# Patient Record
Sex: FEMALE | Race: WHITE | Employment: OTHER | URBAN - METROPOLITAN AREA
[De-identification: names, ages, dates, MRNs, and addresses within clinical notes are randomized per-mention and may not be internally consistent; named-entity substitution may affect disease eponyms.]

---

## 2017-11-24 ENCOUNTER — HOSPITAL ENCOUNTER (OUTPATIENT)
Dept: MAMMOGRAPHY | Facility: CLINIC | Age: 72
Discharge: HOME | End: 2017-11-24
Attending: FAMILY MEDICINE | Admitting: FAMILY MEDICINE
Payer: MEDICARE

## 2017-11-24 DIAGNOSIS — Z12.31 VISIT FOR SCREENING MAMMOGRAM: ICD-10-CM

## 2017-11-24 PROCEDURE — G0202 SCR MAMMO BI INCL CAD: HCPCS

## 2017-12-11 ENCOUNTER — OFFICE VISIT (OUTPATIENT)
Dept: VASCULAR SURGERY | Facility: CLINIC | Age: 72
End: 2017-12-11
Payer: COMMERCIAL

## 2017-12-11 DIAGNOSIS — Z53.9 ERRONEOUS ENCOUNTER--DISREGARD: Primary | ICD-10-CM

## 2017-12-11 PROCEDURE — 99203 OFFICE O/P NEW LOW 30 MIN: CPT | Performed by: SURGERY

## 2017-12-11 NOTE — MR AVS SNAPSHOT
"              After Visit Summary   2017    Batsheva Fuentes    MRN: [id number]           Patient Information     Date Of Birth          1945        Visit Information        Provider Department      2017 1:15 PM Nagi Baig MD [facility]      Today's Diagnoses     ERRONEOUS ENCOUNTER--DISREGARD    -  1       Follow-ups after your visit        Who to contact     If you have questions or need follow up information about today's clinic visit or your schedule please contact [facility] directly at [phone].  Normal or non-critical lab and imaging results will be communicated to you by MyChart, letter or phone within 4 business days after the clinic has received the results. If you do not hear from us within 7 days, please contact the clinic through The Whistlehart or phone. If you have a critical or abnormal lab result, we will notify you by phone as soon as possible.  Submit refill requests through Aunt Bertha or call your pharmacy and they will forward the refill request to us. Please allow 3 business days for your refill to be completed.          Additional Information About Your Visit        MyChart Information     Aunt Bertha lets you send messages to your doctor, view your test results, renew your prescriptions, schedule appointments and more. To sign up, go to [email or web address] . Click on \"Log in\" on the left side of the screen, which will take you to the Welcome page. Then click on \"Sign up Now\" on the right side of the page.     You will be asked to enter the access code listed below, as well as some personal information. Please follow the directions to create your username and password.     Your access code is: [email or web address]  Expires: 2018  9:02 AM     Your access code will  in 90 days. If you need help or a new code, please call your [place] clinic or [phone].        Care EveryWhere ID  "    This is your Care EveryWhere ID. This could be used by other organizations to access your [facility] medical records  [id number]         Blood Pressure from Last 3 Encounters:   No data found for BP    Weight from Last 3 Encounters:   No data found for Wt              Today, you had the following     No orders found for display       Primary Care Provider Office Phone # Fax #    Eileen oG PA-C [phone] [address]  [place] [address]        Equal Access to Services     NATALIIA OLIVA : Hadii aad ku hadasho Soomaali, waaxda luqadaha, qaybta kaalmada adeegyada, waxay idiin hayaan adeeg kharash lata . So [facility] [phone].    ATENCIÓN: Si habla español, tiene a archer disposición servicios gratuitos de asistencia lingüística. LlOur [facility] [phone].    We comply with applicable federal civil rights laws and [place] laws. We do not discriminate on the basis of race, color, national origin, age, disability, sex, sexual orientation, or gender identity.            Thank you!     Thank you for choosing [facility]  for your care. Our goal is always to provide you with excellent care. Hearing back from our patients is one way we can continue to improve our services. Please take a few minutes to complete the written survey that you may receive in the mail after your visit with us. Thank you!             Your Updated Medication List - Protect others around you: Learn how to safely use, store and throw away your medicines at www.disposemymeds.org.      Notice  As of 12/11/2017 11:59 PM    You have not been prescribed any medications.

## 2017-12-11 NOTE — PROGRESS NOTES
[facility]: Gloria Fuentes And her  came to see me today for venous consultation.  This 72-year-old patient has had varicose veins in her right thigh region that developed after an injury at work being hit by file cabinet 25 years ago. These have given her some gradually increasing achy sensation but no terrible problems  She's never worn compression.  She developed a tender area over her right mid medial calf.  She reports ultrasound was performed via the  [facility] [employer] system on 10/16/2017.  She was admitted overnight and started on Lovenox followed by Coumadin recommended to be continued for three months.  Apparently a tibial DVT was noted though the reports are not available to me.    With her larger varicose veins she comes discuss this further today about potential treatment..  She also wondered this had anything to do with her right leg blood clot. She has noticed swelling in her right calf and ankle since the event.  She has not used compression.    PMH: No allergies.            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                            Hysterectomy for benign disease                            Sinus surgery             Medical: Hypertension, hypothyroidism, depression             Nonsmoker.    Social history: Retired, lives independently with her .                           Walks at least 30 minutes daily with no claudication symptoms nor shortness of breath nor chest pain.    Exam: Very pleasant alert woman.  Height 5 foot 7 inches.  Weight= 150 pound              Glasses and hearing aids               HEENT= normal.  No carotid bruits.               Chest= clear   Cardiovascular= regular rate with 1/6 murmur (  "noted since                                     childbirth)                Abdomen=thin, soft, nontender                Extremities:   Normal sensation.  +3 posterior tibial pulses                                      Right calf is 29 cm versus 25 cm and the left                                      Very large tortuous varicose veins starting the proximal one quarter of the right thigh extending down just below the knee measuring 60 mm in diameter.  A few other varicose veins noted but very small.  A few diffuse telangiectasia bilaterally.      Impression:  #1  Right thigh and knee very dilated varicose veins.  Likely, the greater saphenous system.  No specific evaluation has been performed.  Plan duplex ultrasound of the venous system including greater saphenous vein. Likely would benefit due to the large size of the vein in symptoms with surgical treatment.                         #2   Right medial calf \" blood clot\".  Her symptoms of discomfort at the area more consistent with a superficial thrombophlebitis of the greater saphenous vein from the location clinically.  However,the fact that she was treated with anticoagulation for three months imply this may have been a tibial DVT.  She'll obtain the records from the hospital and forward these to us.  She is scheduled to be on her Coumadin until January 2018.  We will see her back at that time and repeat the duplex ultrasound evaluating function of her venous system but also to look at the tibial DVT to see if it is resolved.                          #3   Right calf swelling noted after the blood clot.  This implies that it certainly could've been a tibial DVT since one would not expect swelling from a superficial thrombophlebitis.  She'll start wearing her knee-high compression stocking which may improve the swelling and also be a pre-treatment necessity prior to any superficial venous surgery.                           #4   No evidence of PAD with excellent " distal pulses and very active with no claudication-type symptoms.    I spent over 30 minutes with the patient and  today with over 50% in counseling.  We will see her again following the duplex ultrasound in January 2018 when she is completing her Coumadin three month therapy.    Nagi Baig MD   Dictated 12/11/2017    Cc.  Dr Eileen Go

## 2018-01-29 ENCOUNTER — APPOINTMENT (OUTPATIENT)
Dept: VASCULAR SURGERY | Facility: CLINIC | Age: 73
End: 2018-01-29
Payer: COMMERCIAL

## 2018-01-29 ENCOUNTER — OFFICE VISIT (OUTPATIENT)
Dept: VASCULAR SURGERY | Facility: CLINIC | Age: 73
End: 2018-01-29
Payer: COMMERCIAL

## 2018-01-29 DIAGNOSIS — Z53.9 ERRONEOUS ENCOUNTER--DISREGARD: Primary | ICD-10-CM

## 2018-01-29 PROCEDURE — 99213 OFFICE O/P EST LOW 20 MIN: CPT | Performed by: SURGERY

## 2018-01-29 PROCEDURE — 93971 EXTREMITY STUDY: CPT | Performed by: SURGERY

## 2018-01-29 NOTE — MR AVS SNAPSHOT
"              After Visit Summary   2018    Batsheva Fuentes    MRN: [id number]           Patient Information     Date Of Birth          1945        Visit Information        Provider Department      2018 4:15 PM [facility]i [facility]ig [facility]      Today's Diagnoses     ERRONEOUS ENCOUNTER--DISREGARD    -  1       Follow-ups after your visit        Who to contact     If you have questions or need follow up information about today's clinic visit or your schedule please contact [facility] directly at [phone].  Normal or non-critical lab and imaging results will be communicated to you by MyChart, letter or phone within 4 business days after the clinic has received the results. If you do not hear from us within 7 days, please contact the clinic through ESCAPESwithYOUhart or phone. If you have a critical or abnormal lab result, we will notify you by phone as soon as possible.  Submit refill requests through [employer] or call your pharmacy and they will forward the refill request to us. Please allow 3 business days for your refill to be completed.          Additional Information About Your Visit        MyChart Information     E[employer]ault lets you send messages to your doctor, view your test results, renew your prescriptions, schedule appointments and more. To sign up, go to [email or web address] . Click on \"Log in\" on the left side of the screen, which will take you to the Welcome page. Then click on \"Sign up Now\" on the right side of the page.     You will be asked to enter the access code listed below, as well as some personal information. Please follow the directions to create your username and password.     Your access code is: [email or web address]  Expires: 2018  9:02 AM     Your access code will  in 90 days. If you need help or a new code, please call your [place] clinic or [phone].        Care EveryWhere ID     " This is your Care EveryWhere ID. This could be used by other organizations to access your [facility] medical records  [id number]         Blood Pressure from Last 3 Encounters:   No data found for BP    Weight from Last 3 Encounters:   No data found for Wt              Today, you had the following     No orders found for display       Primary Care Provider Office Phone # Fax #    Eileen Go PA-C [phone] [address]  [place] [address]        Equal Access to Services     NATALIIA OLIVA : Hadii aad ku hadasho Soomaali, waaxda luqadaha, qaybta kaalmada adeegyada, waxay idiin hayaan adeeg kharash lata . So [facility] [phone].    ATENCIÓN: Si habla español, tiene a archer disposición servicios gratuitos de asistencia lingüística. LlHighland District Hospital [phone].    We comply with applicable federal civil rights laws and [place] laws. We do not discriminate on the basis of race, color, national origin, age, disability, sex, sexual orientation, or gender identity.            Thank you!     Thank you for choosing [facility]  for your care. Our goal is always to provide you with excellent care. Hearing back from our patients is one way we can continue to improve our services. Please take a few minutes to complete the written survey that you may receive in the mail after your visit with us. Thank you!             Your Updated Medication List - Protect others around you: Learn how to safely use, store and throw away your medicines at www.disposemymeds.org.      Notice  As of 1/29/2018 11:59 PM    You have not been prescribed any medications.

## 2018-01-29 NOTE — PROGRESS NOTES
[facility]: Gloria Fuentes and her  return to see me at the clinic today.  I recently saw her painful markedly dilated varicose veins in her right thigh proximal calf on 12/11/2017.  She was reported to have a tibial DVT on 10/16/2017 ultrasound for which she was placed on Coumadin that finished on 1/15/2018.  We wanted her to come back at this time for a formal duplex ultrasound of her right leg particularly the greater saphenous system but also to evaluate the deep venous system following her Coumadin treatment.  She started wearing compression intermittently since that visit which did not give her any relief of her symptoms.    We did obtain the formal report of the right leg ultrasound.  This revealed a partially occlusive thrombus within the tibialis posterior vein in its mid and distal portions.  She also had a superficial phlebitis and a varicosity in the distal calf coming from the greater saphenous vein.        On exam today she remains alert and appropriate.      Very dilated varicose veins starting in the mid medial thigh and extending proximally         And distally to the patella extending down to the proximal calf.  No significant calf                            edema noted today.  No calf tenderness.       Normal distal pulses.      Duplex ultrasound was performed which revealed no obvious thrombus within the deep system.  There is incompetence of the common femoral and proximal superficial femoral veins but the rest of the deep system is competent implying lower risk for significant deep venous insufficiency and eventual ulceration.  Greater saphenous vein is incompetent  Very dilated from the saphenofemoral junction down to the ankle.  Maximum diameter is 8.8 mm in the mid thigh-15.5 mm at the knee-6 mm in the calf.  Varicosities come off the mid thigh incompetent dilated greater saphenous vein.  An incompetent  measuring 5 mm in diameter is located above the  medial malleolus communicating with 1 of the varicosities.  The proximal and mid lesser saphenous is competent small.  This is incompetent the distal calf but only 2.9 mm in diameter.  The previously noted phlebitis is no longer visible in the greater saphenous system distally.        Impression:    Symptomatic right thigh-calf varicose veins with incompetence and marked dilatation of the greater saphenous system with significant reflux.  The thigh has some incompetence the deep system but likely not clinically significant since the distal valves are intact.  The previously noted partially occlusion of the tibial veins has resolved as has the distal greater saphenous vein phlebitis.                              With these findings I would recommend that she undergo ablation of the entire greater saphenous system from the saphenofemoral junction to the ankle.  She is aware she may have some numbness in the greater saphenous nerve distribution distally that may be permanent.  Medically necessary stab phlebectomies of the calf and thigh varicosities will be performed.  We should be able to treat the incompetent  indirectly with the surgery with no direct plan to close this  since there is no ulceration.  I do not feel treatment of the distal segment lesser saphenous vein is clinically indicated.  This would be performed our light oral sedation as an outpatient.  She is Loi finished her Coumadin treatment and therefore this is not an issue.  We discussed her post treatment compression and duplex follow-up protocol with her and her  today and answered all questions.    Nagi Baig MD     Dictated 1/29/2018 at 1450 hrs.

## 2018-02-14 ENCOUNTER — APPOINTMENT (OUTPATIENT)
Dept: VASCULAR SURGERY | Facility: CLINIC | Age: 73
End: 2018-02-14
Payer: COMMERCIAL

## 2018-02-14 ENCOUNTER — OFFICE VISIT (OUTPATIENT)
Dept: VASCULAR SURGERY | Facility: CLINIC | Age: 73
End: 2018-02-14
Payer: COMMERCIAL

## 2018-02-14 DIAGNOSIS — Z53.9 ERRONEOUS ENCOUNTER--DISREGARD: Primary | ICD-10-CM

## 2018-02-14 PROCEDURE — 37765 STAB PHLEB VEINS XTR 10-20: CPT | Mod: RT | Performed by: SURGERY

## 2018-02-14 PROCEDURE — 99207 ZZC VEINSOLUTIONS NO CHARGE VISIT: CPT | Performed by: SURGERY

## 2018-02-14 PROCEDURE — 36475 ENDOVENOUS RF 1ST VEIN: CPT | Mod: RT | Performed by: SURGERY

## 2018-02-14 NOTE — MR AVS SNAPSHOT
"              After Visit Summary   2018    Batsheva Fuentes    MRN: [id number]           Patient Information     Date Of Birth          1945        Visit Information        Provider Department      2018 9:00 AM Nagi Baig MD;  VEIN PROCEDURE ROOM 2 [facility]      Today's Diagnoses     ERRONEOUS ENCOUNTER--DISREGARD    -  1       Follow-ups after your visit        Who to contact     If you have questions or need follow up information about today's clinic visit or your schedule please contact [facility] directly at [phone].  Normal or non-critical lab and imaging results will be communicated to you by IntegralReachhart, letter or phone within 4 business days after the clinic has received the results. If you do not hear from us within 7 days, please contact the clinic through IntegralReachhart or phone. If you have a critical or abnormal lab result, we will notify you by phone as soon as possible.  Submit refill requests through ShieldEffect or call your pharmacy and they will forward the refill request to us. Please allow 3 business days for your refill to be completed.          Additional Information About Your Visit        MyChart Information     ShieldEffect lets you send messages to your doctor, view your test results, renew your prescriptions, schedule appointments and more. To sign up, go to [email or web address] . Click on \"Log in\" on the left side of the screen, which will take you to the Welcome page. Then click on \"Sign up Now\" on the right side of the page.     You will be asked to enter the access code listed below, as well as some personal information. Please follow the directions to create your username and password.     Your access code is: [email or web address]  Expires: 2018  9:02 AM     Your access code will  in 90 days. If you need help or a new code, please call your [place] clinic or [phone].   "      Care EveryWhere ID     This is your Care EveryWhere ID. This could be used by other organizations to access your [facility] medical records  [id number]         Blood Pressure from Last 3 Encounters:   No data found for BP    Weight from Last 3 Encounters:   No data found for Wt              Today, you had the following     No orders found for display       Primary Care Provider Office Phone # Fax #    Eileen Lola Go PA-C [phone] [address] [facility] [address]  [place] [address]        Equal Access to Services     [facility] CenterCOL[facility] : Hadii aad ku hadasho Soomaali, waaxda luqadaha, qaybta kaalmada adeegyada, waxay idiin hayaan adeeg kharaken deng . So [facility] [phone].    ATENCIÓN: Si habla español, tiene a archer disposición servicios gratuitos de asistencia lingüística. LlSelect Medical [facility] [place] [phone].    We comply with applicable federal civil rights laws and [place] laws. We do not discriminate on the basis of race, color, national origin, age, disability, sex, sexual orientation, or gender identity.            Thank you!     Thank you for choosing [facility]  for your care. Our goal is always to provide you with excellent care. Hearing back from our patients is one way we can continue to improve our services. Please take a few minutes to complete the written survey that you may receive in the mail after your visit with us. Thank you!             Your Updated Medication List - Protect others around you: Learn how to safely use, store and throw away your medicines at www.disposemymeds.org.      Notice  As of 2/14/2018 11:59 PM    You have not been prescribed any medications.

## 2018-02-14 NOTE — PROGRESS NOTES
[facility]: [facility]      Vascular Surgery Operative Note    PREOPERATIVE DIAGNOSIS: Symptomatic right leg varicose veins with history of deep venous thrombosis and superficial thrombophlebitis failing conservative treatment.     POSTOPERATIVE DIAGNOSIS: Same    PROCEDURE: #1    Radiofrequency ablation right greater saphenous vein from saphenofemoral junction to ankle.                              #2      Medically necessary stab phlebectomies right thigh-calf-ankle varicose veins.    ANESTHESIA:   Tumescent anesthesia    PREOPERATIVE MEDICATIONS: Ativan 2 mg-clonidine 0.1 mg orally    SURGEON:  Nagi Baig MD    ASSISTANT:  Analy Fung, CST/CFA.  Assistant was required owing to challenging exposure and need for retraction.    INDICATIONS: 72-year-old patient has developed markedly enlarged painful varicosities starting in her right mid thigh extending down to the proximal calf and several areas on the medial distal calf with many needs measuring over 12 mm in diameter.  These will become very painful.  She had a history in the past of deep venous thrombosis and superficial thrombophlebitis.  This was treated conservatively with resolution.  She has tried conservative treatment with compression which has not been successful.  Deep system has incompetence of the common femoral and proximal femoral veins but not the distal femoral or popliteal vein.  Greater saphenous vein is incompetent throughout its entire length measuring up to 15.5 mm in the distal thigh and 6 mm in the calf.  We felt that she would benefit from complete ablation of the greater saphenous vein from the saphenofemoral femoral junction to the ankle (aware there may be some permanent numbness along the greater saphenous nerve distribution which is been discussed with the patient and her ) along with medically necessary stab phlebectomies of the marked surface veins.     With the patient standing the surface veins were marked  along with a markedly dilated ankle greater saphenous vein.    PROCEDURE: Brought to our procedure room.  Placed supine.  The entire right leg and groin area were prepped and draped.  Timeout was called and sites were identified.    Radiofrequency ablation:   Patient was placed in reverse Trendelenburg.  We followed the single-channel greater saphenous vein by duplex from the groin to the ankle.  This was somewhat smaller tortuous just after the large takeoff of the surface veins in the mid thigh.  Rest of the vein was quite dilated measuring at least 6-8 mm throughout its length.  The ankle was anesthetized with 1% lidocaine solution.  Under ultrasound guidance the needle was introduced into the greater saphenous vein with no difficulty followed by guidewire and a 7 Romanian sheath.  Closure fast catheter was selected and flushed.  This was passed up to the sheath to the saphenofemoral junction.  Mild manipulation was required at the narrowed tortuous segment in the mid thigh.  We secured the position of 1.91 cm from the saphenofemoral junction.  She was placed in Trendelenburg where the position was confirmed and documented.                We then anesthetized initially from the knee to the groin and then from the ankle to the knee with her tumescent solution under ultrasound guidance.  She had a deeper vein throughout the entire length.  We began her treatment sessions.  For the 5 segments in the thigh required 2 treatments for 20 seconds at 120 C.  Ultrasound pressure was held over the sites with good visualization effect and no discomfort to the patient.  1 of the Segments also required 2 treatments and the rest with a single treatment with good impedance noted.  Patient tolerated this with no discomfort.        Medically necessary stab phlebectomies:    We then anesthetized the previously marked sites on her thigh-proximal medial calf and ankle region.  Using a micro-ophthalmic blade and 14 stepladder incisions  along with vein hooks to remove these very dilated veins with no discomfort.  The one vein in the ankle which may be connected with the  was ligated with a 3-0 Vicryl suture on the inflow to prevent bleeding.  She tolerated this very well also.      Vaseline ointment was placed over the sites followed by ABD pads-cast rolls-Ace bandage.    Continuous blood pressure-pulse oximeter-pulse was monitored by Ledy Jimenez RN under my direct supervision very stable.  Her sedation was excellent throughout the procedure we did give her supplemental nasal cannula oxygen while she was sleeping.      Patient recovered her sweets and was discharged to home with her .  We treated the greater saphenous vein for a length of 76 cm with 17 treatment sessions and at time of 5 minutes and 40 seconds.  14 stepladder incisions were used for the stab phlebectomies.  We used a total of 370 mL over tumescent solution and 5 mL of her injectable lidocaine-bicarbonate-epinephrine.    Patient was discharged home with her  with a post operative instructions and follow-up.    ESTIMATED BLOOD LOSS: Less than 5 mL      Nagi Baig MD    Dictated 2/14/2018

## 2018-02-16 ENCOUNTER — TELEPHONE (OUTPATIENT)
Dept: VASCULAR SURGERY | Facility: CLINIC | Age: 73
End: 2018-02-16

## 2018-02-16 ENCOUNTER — APPOINTMENT (OUTPATIENT)
Dept: VASCULAR SURGERY | Facility: CLINIC | Age: 73
End: 2018-02-16
Payer: COMMERCIAL

## 2018-02-16 PROCEDURE — 93971 EXTREMITY STUDY: CPT | Performed by: SURGERY

## 2018-02-16 PROCEDURE — 99207 ZZC VEINSOLUTIONS NO CHARGE VISIT: CPT | Performed by: SURGERY

## 2018-02-16 NOTE — TELEPHONE ENCOUNTER
[facility]: [facility]      I called Batsheva JOE Gina today following her right leg vein surgery on 2/14/2018.  She was in the office earlier today for the ultrasound revealed appropriate closure with no complications of the greater saphenous vein.  Her areas of stab phlebectomies are doing well.  She was placed into a knee-high graduated compression stockings to wear during the day for the next 10-14 days.  She may shower.  Also find to sleep without the compression stocking on.    I reviewed her postoperative venous duplex from today which revealed appropriate closure of the greater saphenous system and no complications.    She reports that she is doing fine.  She is somewhat sore in the medial knee where she had extensive varicosities removed but this is well controlled with Tylenol.  We will see her in 6 weeks and follow-up or sooner if she has any concerns.    Nagi Baig MD

## 2018-03-08 ENCOUNTER — APPOINTMENT (OUTPATIENT)
Dept: VASCULAR SURGERY | Facility: CLINIC | Age: 73
End: 2018-03-08
Payer: COMMERCIAL

## 2018-03-08 PROCEDURE — 99207 ZZC VEINSOLUTIONS NO CHARGE VISIT: CPT | Performed by: SURGERY

## 2018-03-17 ENCOUNTER — HOSPITAL (OUTPATIENT)
Dept: OTHER | Age: 73
End: 2018-03-17
Attending: SURGERY

## 2018-03-17 LAB
ALBUMIN SERPL-MCNC: 4 GM/DL (ref 3.6–5.1)
ALBUMIN/GLOB SERPL: 1.2 {RATIO} (ref 1–2.4)
ALP SERPL-CCNC: 52 UNIT/L (ref 45–117)
ALT SERPL-CCNC: 16 UNIT/L
AMORPH SED URNS QL MICRO: ABNORMAL
ANALYZER ANC (IANC): NORMAL
ANION GAP SERPL CALC-SCNC: 11 MMOL/L (ref 10–20)
APPEARANCE UR: CLEAR
APTT PPP: 24 SECONDS (ref 22–30)
APTT PPP: NORMAL S
AST SERPL-CCNC: 21 UNIT/L
BACTERIA #/AREA URNS HPF: ABNORMAL /HPF
BILIRUB SERPL-MCNC: 0.6 MG/DL (ref 0.2–1)
BILIRUB UR QL: NEGATIVE
BUN SERPL-MCNC: 19 MG/DL (ref 6–20)
BUN/CREAT SERPL: 22 (ref 7–25)
CALCIUM SERPL-MCNC: 9.3 MG/DL (ref 8.4–10.2)
CAOX CRY URNS QL MICRO: ABNORMAL
CHLORIDE: 102 MMOL/L (ref 98–107)
CO2 SERPL-SCNC: 28 MMOL/L (ref 21–32)
COLOR UR: YELLOW
CREAT SERPL-MCNC: 0.86 MG/DL (ref 0.51–0.95)
EPITH CASTS #/AREA URNS LPF: ABNORMAL /[LPF]
ERYTHROCYTE [DISTWIDTH] IN BLOOD: 13.2 % (ref 11–15)
FATTY CASTS #/AREA URNS LPF: ABNORMAL /[LPF]
GLOBULIN SER-MCNC: 3.3 GM/DL (ref 2–4)
GLUCOSE BLDC GLUCOMTR-MCNC: 100 MG/DL (ref 65–99)
GLUCOSE BLDC GLUCOMTR-MCNC: 101 MG/DL (ref 65–99)
GLUCOSE SERPL-MCNC: 116 MG/DL (ref 65–99)
GLUCOSE UR-MCNC: NEGATIVE MG/DL
GRAN CASTS #/AREA URNS LPF: ABNORMAL /[LPF]
HEMATOCRIT: 40.5 % (ref 36–46.5)
HGB BLD-MCNC: 13.7 GM/DL (ref 12–15.5)
HGB UR QL: NEGATIVE
HYALINE CASTS #/AREA URNS LPF: ABNORMAL /LPF (ref 0–5)
INR PPP: 1
KETONES UR-MCNC: NEGATIVE MG/DL
LEUKOCYTE ESTERASE UR QL STRIP: NEGATIVE
LIPASE SERPL-CCNC: 194 UNIT/L (ref 73–393)
MAGNESIUM SERPL-MCNC: 1.9 MG/DL (ref 1.7–2.4)
MCH RBC QN AUTO: 29.1 PG (ref 26–34)
MCHC RBC AUTO-ENTMCNC: 33.8 GM/DL (ref 32–36.5)
MCV RBC AUTO: 86.2 FL (ref 78–100)
MIXED CELL CASTS #/AREA URNS LPF: ABNORMAL /[LPF]
MUCOUS THREADS URNS QL MICRO: ABNORMAL
NITRITE UR QL: NEGATIVE
PH UR: 7 UNIT (ref 5–7)
PLATELET # BLD: 280 THOUSAND/MCL (ref 140–450)
POTASSIUM SERPL-SCNC: 2.8 MMOL/L (ref 3.4–5.1)
POTASSIUM SERPL-SCNC: 3.7 MMOL/L (ref 3.4–5.1)
PROT SERPL-MCNC: 7.3 GM/DL (ref 6.4–8.2)
PROT UR QL: NEGATIVE MG/DL
PROTHROMBIN TIME: 10.2 SECONDS (ref 9.7–11.8)
PROTHROMBIN TIME: NORMAL
RBC # BLD: 4.7 MILLION/MCL (ref 4–5.2)
RBC #/AREA URNS HPF: ABNORMAL /HPF (ref 0–3)
RBC CASTS #/AREA URNS LPF: ABNORMAL /[LPF]
RENAL EPI CELLS #/AREA URNS HPF: ABNORMAL /[HPF]
SODIUM SERPL-SCNC: 138 MMOL/L (ref 135–145)
SP GR UR: >1.03 (ref 1–1.03)
SPECIMEN SOURCE: ABNORMAL
SPERM URNS QL MICRO: ABNORMAL
SQUAMOUS #/AREA URNS HPF: ABNORMAL /HPF (ref 0–5)
T VAGINALIS URNS QL MICRO: ABNORMAL
TRI-PHOS CRY URNS QL MICRO: ABNORMAL
URATE CRY URNS QL MICRO: ABNORMAL
URINE REFLEX: ABNORMAL
URNS CMNT MICRO: ABNORMAL
UROBILINOGEN UR QL: 0.2 MG/DL (ref 0–1)
WAXY CASTS #/AREA URNS LPF: ABNORMAL /[LPF]
WBC # BLD: 7.7 THOUSAND/MCL (ref 4.2–11)
WBC #/AREA URNS HPF: ABNORMAL /HPF (ref 0–5)
WBC CASTS #/AREA URNS LPF: ABNORMAL /[LPF]
YEAST HYPHAE URNS QL MICRO: ABNORMAL
YEAST URNS QL MICRO: ABNORMAL

## 2018-03-18 LAB
ALBUMIN SERPL-MCNC: 3.6 GM/DL (ref 3.6–5.1)
ALBUMIN/GLOB SERPL: 1.1 {RATIO} (ref 1–2.4)
ALP SERPL-CCNC: 49 UNIT/L (ref 45–117)
ALT SERPL-CCNC: 15 UNIT/L
ANALYZER ANC (IANC): NORMAL
ANION GAP SERPL CALC-SCNC: 10 MMOL/L (ref 10–20)
AST SERPL-CCNC: 22 UNIT/L
BASOPHILS # BLD: 0 THOUSAND/MCL (ref 0–0.3)
BASOPHILS NFR BLD: 0 %
BILIRUB SERPL-MCNC: 0.8 MG/DL (ref 0.2–1)
BUN SERPL-MCNC: 13 MG/DL (ref 6–20)
BUN/CREAT SERPL: 19 (ref 7–25)
CALCIUM SERPL-MCNC: 9 MG/DL (ref 8.4–10.2)
CHLORIDE: 103 MMOL/L (ref 98–107)
CO2 SERPL-SCNC: 28 MMOL/L (ref 21–32)
CREAT SERPL-MCNC: 0.68 MG/DL (ref 0.51–0.95)
DIFFERENTIAL METHOD BLD: NORMAL
EOSINOPHIL # BLD: 0.1 THOUSAND/MCL (ref 0.1–0.5)
EOSINOPHIL NFR BLD: 1 %
ERYTHROCYTE [DISTWIDTH] IN BLOOD: 13.5 % (ref 11–15)
GLOBULIN SER-MCNC: 3.3 GM/DL (ref 2–4)
GLUCOSE SERPL-MCNC: 114 MG/DL (ref 65–99)
HEMATOCRIT: 40.3 % (ref 36–46.5)
HGB BLD-MCNC: 13.6 GM/DL (ref 12–15.5)
LYMPHOCYTES # BLD: 1.3 THOUSAND/MCL (ref 1–4)
LYMPHOCYTES NFR BLD: 16 %
MAGNESIUM SERPL-MCNC: 1.8 MG/DL (ref 1.7–2.4)
MCH RBC QN AUTO: 29.9 PG (ref 26–34)
MCHC RBC AUTO-ENTMCNC: 33.7 GM/DL (ref 32–36.5)
MCV RBC AUTO: 88.6 FL (ref 78–100)
MONOCYTES # BLD: 0.9 THOUSAND/MCL (ref 0.3–0.9)
MONOCYTES NFR BLD: 11 %
NEUTROPHILS # BLD: 5.8 THOUSAND/MCL (ref 1.8–7.7)
NEUTROPHILS NFR BLD: 72 %
NEUTS SEG NFR BLD: NORMAL %
PERCENT NRBC: NORMAL
PHOSPHATE SERPL-MCNC: 2.8 MG/DL (ref 2.4–4.7)
PLATELET # BLD: 244 THOUSAND/MCL (ref 140–450)
POTASSIUM SERPL-SCNC: 3.5 MMOL/L (ref 3.4–5.1)
PROT SERPL-MCNC: 6.9 GM/DL (ref 6.4–8.2)
RBC # BLD: 4.55 MILLION/MCL (ref 4–5.2)
SODIUM SERPL-SCNC: 137 MMOL/L (ref 135–145)
WBC # BLD: 8 THOUSAND/MCL (ref 4.2–11)

## 2018-03-19 LAB — BNP SERPL-MCNC: 120 PG/ML

## 2018-03-20 LAB
ANALYZER ANC (IANC): NORMAL
ANION GAP SERPL CALC-SCNC: 9 MMOL/L (ref 10–20)
BUN SERPL-MCNC: 13 MG/DL (ref 6–20)
BUN/CREAT SERPL: 19 (ref 7–25)
CALCIUM SERPL-MCNC: 9.2 MG/DL (ref 8.4–10.2)
CHLORIDE: 99 MMOL/L (ref 98–107)
CO2 SERPL-SCNC: 29 MMOL/L (ref 21–32)
CREAT SERPL-MCNC: 0.68 MG/DL (ref 0.51–0.95)
ERYTHROCYTE [DISTWIDTH] IN BLOOD: 12.9 % (ref 11–15)
GLUCOSE SERPL-MCNC: 112 MG/DL (ref 65–99)
HEMATOCRIT: 40.3 % (ref 36–46.5)
HGB BLD-MCNC: 13.7 GM/DL (ref 12–15.5)
MCH RBC QN AUTO: 29.9 PG (ref 26–34)
MCHC RBC AUTO-ENTMCNC: 34 GM/DL (ref 32–36.5)
MCV RBC AUTO: 88 FL (ref 78–100)
PLATELET # BLD: 243 THOUSAND/MCL (ref 140–450)
POTASSIUM SERPL-SCNC: 3.8 MMOL/L (ref 3.4–5.1)
RBC # BLD: 4.58 MILLION/MCL (ref 4–5.2)
SODIUM SERPL-SCNC: 133 MMOL/L (ref 135–145)
WBC # BLD: 8.1 THOUSAND/MCL (ref 4.2–11)

## 2018-03-21 LAB
ANION GAP SERPL CALC-SCNC: 9 MMOL/L (ref 10–20)
BUN SERPL-MCNC: 21 MG/DL (ref 6–20)
BUN/CREAT SERPL: 28 (ref 7–25)
CALCIUM SERPL-MCNC: 9.1 MG/DL (ref 8.4–10.2)
CHLORIDE: 100 MMOL/L (ref 98–107)
CO2 SERPL-SCNC: 30 MMOL/L (ref 21–32)
CREAT SERPL-MCNC: 0.75 MG/DL (ref 0.51–0.95)
GLUCOSE SERPL-MCNC: 106 MG/DL (ref 65–99)
POTASSIUM SERPL-SCNC: 4.2 MMOL/L (ref 3.4–5.1)
SODIUM SERPL-SCNC: 135 MMOL/L (ref 135–145)

## 2018-03-26 ENCOUNTER — OFFICE VISIT (OUTPATIENT)
Dept: VASCULAR SURGERY | Facility: CLINIC | Age: 73
End: 2018-03-26
Payer: COMMERCIAL

## 2018-03-26 DIAGNOSIS — Z53.9 ERRONEOUS ENCOUNTER--DISREGARD: Primary | ICD-10-CM

## 2018-03-26 PROCEDURE — 99207 ZZC VEINSOLUTIONS POST OPERATIVE VISIT: CPT | Performed by: SURGERY
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
## 2018-03-26 NOTE — MR AVS SNAPSHOT
"              After Visit Summary   3/26/2018    Batsheva Fuentes    MRN: [id number]           Patient Information     Date Of Birth          1945        Visit Information        Provider Department      3/26/2018 1:00 PM Nagi Baig MD [facility]      Today's Diagnoses     ERRONEOUS ENCOUNTER--DISREGARD    -  1       Follow-ups after your visit        Who to contact     If you have questions or need follow up information about today's clinic visit or your schedule please contact [facility] directly at [phone].  Normal or non-critical lab and imaging results will be communicated to you by MyChart, letter or phone within 4 business days after the clinic has received the results. If you do not hear from us within 7 days, please contact the clinic through [employer] or phone. If you have a critical or abnormal lab result, we will notify you by phone as soon as possible.  Submit refill requests through [employer] or call your pharmacy and they will forward the refill request to us. Please allow 3 business days for your refill to be completed.          Additional Information About Your Visit        MyChart Information     [employer] lets you send messages to your doctor, view your test results, renew your prescriptions, schedule appointments and more. To sign up, go to [email or web address] . Click on \"Log in\" on the left side of the screen, which will take you to the Welcome page. Then click on \"Sign up Now\" on the right side of the page.     You will be asked to enter the access code listed below, as well as some personal information. Please follow the directions to create your username and password.     Your access code is: [email or web address]  Expires: 2018  9:02 AM     Your access code will  in 90 days. If you need help or a new code, please call your [facility] clinic or [phone].        Care EveryWhere ID     " This is your Care EveryWhere ID. This could be used by other organizations to access your [facility] medical records  [id number]         Blood Pressure from Last 3 Encounters:   No data found for BP    Weight from Last 3 Encounters:   No data found for Wt              Today, you had the following     No orders found for display       Primary Care Provider Office Phone # Fax #    Eileen Go PA-C [phone] [address]  [place] [address]        Equal Access to Services     NATALIIA OLIVA : Hadii aad ku hadasho Soomaali, waaxda luqadaha, qaybta kaalmada adeegyada, waxay idiin hayaan adeeg kharash lata . So [facility] [phone].    ATENCIÓN: Si habla español, tiene a archer disposición servicios gratuitos de asistencia lingüística. [facility] [phone].    We comply with applicable federal civil rights laws and [place] laws. We do not discriminate on the basis of race, color, national origin, age, disability, sex, sexual orientation, or gender identity.            Thank you!     Thank you for choosing [facility]  for your care. Our goal is always to provide you with excellent care. Hearing back from our patients is one way we can continue to improve our services. Please take a few minutes to complete the written survey that you may receive in the mail after your visit with us. Thank you!             Your Updated Medication List - Protect others around you: Learn how to safely use, store and throw away your medicines at www.disposemymeds.org.      Notice  As of 3/26/2018 11:59 PM    You have not been prescribed any medications.

## 2018-04-16 ENCOUNTER — HOSPITAL ENCOUNTER (OUTPATIENT)
Dept: MRI IMAGING | Facility: CLINIC | Age: 73
Discharge: HOME | End: 2018-04-16
Payer: COMMERCIAL

## 2018-04-16 DIAGNOSIS — I62.9 INTRACRANIAL HEMORRHAGE (H): ICD-10-CM

## 2018-04-16 LAB
CREAT SERPL-MCNC: 0.87 MG/DL (ref 0.52–1.04)
GFR SERPL CREATININE-BSD FRML MDRD: 63 ML/MIN/1.7M2

## 2018-04-16 PROCEDURE — 82565 ASSAY OF CREATININE: CPT

## 2018-04-16 PROCEDURE — 25500064 ZZH RX 255 OP 636

## 2018-04-16 PROCEDURE — A9579 GAD-BASE MR CONTRAST NOS,1ML: HCPCS

## 2018-04-16 PROCEDURE — 36415 COLL VENOUS BLD VENIPUNCTURE: CPT

## 2018-04-16 PROCEDURE — 70553 MRI BRAIN STEM W/O & W/DYE: CPT

## 2018-04-16 RX ADMIN — GADOPENTETATE DIMEGLUMINE 15 ML: 469.01 INJECTION INTRAVENOUS at 18:41

## 2018-05-29 ENCOUNTER — HOSPITAL ENCOUNTER (OUTPATIENT)
Dept: CT IMAGING | Facility: CLINIC | Age: 73
Discharge: HOME | End: 2018-05-29
Payer: MEDICARE

## 2018-05-29 DIAGNOSIS — S06.5XAA SUBDURAL HEMATOMA (H): ICD-10-CM

## 2018-05-29 PROCEDURE — 70450 CT HEAD/BRAIN W/O DYE: CPT

## 2018-12-05 ENCOUNTER — RADIANT APPOINTMENT (OUTPATIENT)
Dept: GENERAL RADIOLOGY | Facility: CLINIC | Age: 73
End: 2018-12-05
Attending: FAMILY MEDICINE
Payer: COMMERCIAL

## 2018-12-05 ENCOUNTER — OFFICE VISIT (OUTPATIENT)
Dept: ORTHOPEDICS | Facility: CLINIC | Age: 73
End: 2018-12-05
Payer: COMMERCIAL

## 2018-12-05 VITALS
SYSTOLIC BLOOD PRESSURE: 118 MMHG | BODY MASS INDEX: 24.17 KG/M2 | HEIGHT: 67 IN | WEIGHT: 154 LBS | DIASTOLIC BLOOD PRESSURE: 66 MMHG

## 2018-12-05 DIAGNOSIS — M25.551 PAIN OF BOTH HIP JOINTS: ICD-10-CM

## 2018-12-05 DIAGNOSIS — M25.552 PAIN OF BOTH HIP JOINTS: Primary | ICD-10-CM

## 2018-12-05 DIAGNOSIS — M70.61 TROCHANTERIC BURSITIS OF BOTH HIPS: ICD-10-CM

## 2018-12-05 DIAGNOSIS — M25.551 PAIN OF BOTH HIP JOINTS: Primary | ICD-10-CM

## 2018-12-05 DIAGNOSIS — M25.552 PAIN OF BOTH HIP JOINTS: ICD-10-CM

## 2018-12-05 DIAGNOSIS — M70.62 TROCHANTERIC BURSITIS OF BOTH HIPS: ICD-10-CM

## 2018-12-05 PROCEDURE — 99203 OFFICE O/P NEW LOW 30 MIN: CPT | Performed by: FAMILY MEDICINE

## 2018-12-05 PROCEDURE — 73522 X-RAY EXAM HIPS BI 3-4 VIEWS: CPT

## 2018-12-05 RX ORDER — LOSARTAN POTASSIUM AND HYDROCHLOROTHIAZIDE 25; 100 MG/1; MG/1
1 TABLET ORAL
COMMUNITY
Start: 2011-05-09 | End: 2021-01-29 | Stop reason: ALTCHOICE

## 2018-12-05 RX ORDER — LEVOTHYROXINE SODIUM 75 UG/1
1 TABLET ORAL
COMMUNITY
Start: 2011-05-09

## 2018-12-05 NOTE — MR AVS SNAPSHOT
After Visit Summary   12/5/2018    Batsheva Fuentes    MRN: [id number]           Patient Information     Date Of Birth          1945        Visit Information        Provider Department      12/5/2018 11:00 AM Aleena Wise, DO [facility]        Today's Diagnoses     Pain of both hip joints    -  1    Trochanteric bursitis of both hips          Care Instructions    1. Pain of both hip joints    2. Trochanteric bursitis of both hips      Physical therapy: [facility] - [phone]  Reviewed xray - hip arthritis but pain is not coming from your hip joint.  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 If that becomes limiting can consider cortisone injection with pain management or at the hospital.  Can return for hip bursa injections if it will make you more successful in therapy    Follow-up for injections if needed to progress with therapy.                Follow-ups after your visit        Additional Services     [facility], AND CHIROPRACTIC REFERRAL       Physical Therapy, Hand Therapy and Chiropractic Care are available through:  *[facility]  *Hand Therapy (Occupational Therapy or Physical Therapy)  *[facility]    Call one number to schedule at any of the above locations: [phone].    Physical therapy, Hand therapy and/or Chiropractic care has been recommended by your physician as an excellent treatment option to reduce pain and help people return to normal activities, including sports.  Therapy and/or chiropractic care services are a great complement or alternative to expensive and invasive surgery, injections, or long-term use of prescription medications. The primary goal is to identify the underlying problem and provide you the tools to manage your condition on your own.     Please be aware  "that coverage of these services is subject to the terms and limitations of your health insurance plan.  Call member services at your health plan with any benefit or coverage questions.      Please bring the following to your appointment:  *Your personal calendar for scheduling future appointments  *Comfortable clothing                  Future tests that were ordered for you today     Open Future Orders        Priority Expected Expires Ordered    [facility], [facility] CHIR[facility]RACTIC REFERRAL Routine  12/5/2019 12/5/2018            Who to contact     If you have questions or need follow up information about today's clinic visit or your schedule please contact [facility] directly at [phone].  Normal or non-critical lab and imaging results will be communicated to you by MyChart, letter or phone within 4 business days after the clinic has received the results. If you do not hear from us within 7 days, please contact the clinic through MyChart or phone. If you have a critical or abnormal lab result, we will notify you by phone as soon as possible.  Submit refill requests through [employer] or call your pharmacy and they will forward the refill request to us. Please allow 3 business days for your refill to be completed.          Additional Information About Your Visit        Care EveryWhere ID     This is your Care EveryWhere ID. This could be used by other organizations to access your [facility] medical records  [id number]        Your Vitals Were     Height BMI (Body Mass Index)                5' 6.5\" (1.689 m) 24.48 kg/m2           Blood Pressure from Last 3 Encounters:   12/05/18 118/66    Weight from Last 3 Encounters:   12/05/18 154 lb (69.9 kg)               Primary Care Provider Office Phone # Fax #    Eileenmarlys Go PA-C [phone] [address]  [place] [address]        Equal Access to Services     JOVANI OLIVA AH: Hadii rosalie Khan, " stacey sandovalreynakhris burdickolga shaguftain hayaan adeeg kharash la'aan ah. So [facility] [place] [phone].    ATENCIÓN: Si herminia crawford, tiene a archer disposición servicios gratuitos de asistencia lingüística. Jacob al [phone].    We comply with applicable federal civil rights laws and [place] laws. We do not discriminate on the basis of race, color, national origin, age, disability, sex, sexual orientation, or gender identity.            Thank you!     Thank you for choosing [facility]  for your care. Our goal is always to provide you with excellent care. Hearing back from our patients is one way we can continue to improve our services. Please take a few minutes to complete the written survey that you may receive in the mail after your visit with us. Thank you!             Your Updated Medication List - Protect others around you: Learn how to safely use, store and throw away your medicines at www.disposemymeds.org.          This list is accurate as of 12/5/18 11:55 AM.  Always use your most recent med list.                   Brand Name Dispense Instructions for use Diagnosis    HYZAAR 100-25 MG tablet   Generic drug:  losartan-hydrochlorothiazide      Take 1 tablet by mouth        levothyroxine 75 MCG tablet    SYNTHROID/LEVOTHROID     Take 1 tablet by mouth        sertraline 50 MG tablet    ZOLOFT

## 2018-12-05 NOTE — PATIENT INSTRUCTIONS
1. Pain of both hip joints    2. Trochanteric bursitis of both hips      Physical therapy: [facility] - [phone]  Reviewed xray - hip arthritis but pain is not coming from your hip joint.  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 If that becomes limiting can consider cortisone injection with pain management or at the hospital.  Can return for hip bursa injections if it will make you more successful in therapy    Follow-up for injections if needed to progress with therapy.
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
## 2018-12-05 NOTE — PROGRESS NOTES
"ASSESSMENT & PLAN  There are no Patient Instructions on file for this visit.  -----    SUBJECTIVE  Batsheva Fuentes is a/an 73 year old { :[id number]} female who is seen {[facility] CONSULT:798146} for evaluation of {Right Left Bilateral:365307} shoulder pain. The patient is seen {sjaparent:431959}.    Onset: {sjadays/weeks/months/years:140357}. {Precipitating Event:465154}  Location of Pain: {side:5002} ***  Rating of Pain at worst: {PAIN SCALE:989047}  Rating of Pain Currently: {PAIN SCALE:744387}  Worsened by: ***  Better with: ***  Treatments tried: {sjatreatmentoptions:540055}  Associated symptoms: {thassociatedsx:489334}  Orthopedic history: {YES - DATE/NO:679149::\"NO\"}  Relevant surgical history: {YES - DATE/NO:078951::\"NO\"}  Patient Social History: {social history:579972}    Patient's past medical, surgical, social, and family histories were reviewed today {SBDROSEXCEPTIONS:459764}    REVIEW OF SYSTEMS:  10 point ROS is negative other than symptoms noted above in HPI, Past Medical History or as stated below  Constitutional: NEGATIVE for fever, chills, change in weight  Skin: NEGATIVE for worrisome rashes, moles or lesions  GI/: NEGATIVE for bowel or bladder changes  Neuro: NEGATIVE for weakness, dizziness or paresthesias    OBJECTIVE:  There were no vitals taken for this visit.   General: healthy, alert and in no distress  HEENT: no scleral icterus or conjunctival erythema  Skin: no suspicious lesions or rash. No jaundice.  CV: regular rhythm by palpation  Resp: normal respiratory effort without conversational dyspnea   Psych: normal mood and affect  Gait: normal steady gait with appropriate coordination and balance  Neuro: normal light touch sensory exam of the bilateral upper extremities.    MSK:  {LEFT/RIGHT CAPS:821109} SHOULDER  Inspection:    {[facility] SHOULDER INSPECTION:656296:a:\"no swelling, bruising, discoloration, or obvious deformity or asymmetry\"}  Palpation:    Tender about the {[facility] SHOULDER " "TENDERNESS2:063600}. Remainder of bony and tendinous landmarks are nontender.  Active Range of Motion:     Abduction {FSOC ROM SHOULDER:879053::\"180\"}0, FF {FSOC ROM SHOULDER:704928::\"180\"}0, ER {FSOC ROM SHOULDER:363475::\"180\"}0, IR {FSOC ROM EVAL:645077}.      Scapular dyskinesis ***  Strength:    Scapular plane abduction {FSOC STRENGTH RANGE:195651},  ER {FSOC STRENGTH RANGE:596879}, IR {FSOC STRENGTH RANGE:663151}, biceps {FSOC STRENGTH RANGE:970429}, triceps {FSOC STRENGTH RANGE:880260}  Special Tests:    Positive: {FSOC SHOULDER TESTS REV:934814}    Negative: {FSOC SHOULDER TESTS REV:167635}    CERVICAL SPINE  Inspection:    normal cervical lordosis present, rounded shoulders, forward head posture  Palpation:    Tender about the {FSOC CSPINE PALPATION REV:590225:a}. Otherwise remainder of the landmarks and nontender.  Range of Motion:     Flexion {Pain limited:686646}    Extension {Pain limited:940175}    Right side bend {Pain limited:759467}    Left side bend {Pain limited:613618}    Right rotation {Pain limited:033128}    Left rotation {Pain limited:887521}  Strength:    {FSOC CSPINE STRENGTH REV:881312::\"Full strength throughout all neck muscles\"}  Special Tests:    Positive: {FSOC CSPINE SPECIAL TESTS:018357}    Negative: {FSOC CSPINE SPECIAL TESTS:539751}    Independent visualization of the below image:  No results found for this or any previous visit (from the past 24 hour(s)).      Patient's conditions were thoroughly discussed during today's visit with greater than 50% of the visit spent counseling the patient with total time spent face-to-face with the patient being *** minutes.    Aleena Wise, DO [facility]    "

## 2019-01-02 ENCOUNTER — THERAPY VISIT (OUTPATIENT)
Dept: PHYSICAL THERAPY | Facility: CLINIC | Age: 74
End: 2019-01-02
Attending: FAMILY MEDICINE
Payer: COMMERCIAL

## 2019-01-02 DIAGNOSIS — M25.552 PAIN OF BOTH HIP JOINTS: ICD-10-CM

## 2019-01-02 DIAGNOSIS — M25.551 PAIN OF BOTH HIP JOINTS: ICD-10-CM

## 2019-01-02 DIAGNOSIS — M70.62 TROCHANTERIC BURSITIS OF BOTH HIPS: ICD-10-CM

## 2019-01-02 DIAGNOSIS — M70.61 TROCHANTERIC BURSITIS OF BOTH HIPS: ICD-10-CM

## 2019-01-02 PROCEDURE — 97110 THERAPEUTIC EXERCISES: CPT | Mod: GP | Performed by: PHYSICAL THERAPIST

## 2019-01-02 PROCEDURE — G8978 MOBILITY CURRENT STATUS: HCPCS | Mod: GP | Performed by: PHYSICAL THERAPIST

## 2019-01-02 PROCEDURE — 97161 PT EVAL LOW COMPLEX 20 MIN: CPT | Mod: GP | Performed by: PHYSICAL THERAPIST

## 2019-01-02 PROCEDURE — G8979 MOBILITY GOAL STATUS: HCPCS | Mod: GP | Performed by: PHYSICAL THERAPIST

## 2019-01-02 ASSESSMENT — ACTIVITIES OF DAILY LIVING (ADL)
GOING_UP_1_FLIGHT_OF_STAIRS: NO DIFFICULTY AT ALL
HOS_ADL_COUNT: 16
SITTING_FOR_15_MINUTES: MODERATE DIFFICULTY
ROLLING_OVER_IN_BED: SLIGHT DIFFICULTY
WALKING_INITIALLY: NO DIFFICULTY AT ALL
DEEP_SQUATTING: EXTREME DIFFICULTY
HOS_ADL_HIGHEST_POTENTIAL_SCORE: 64
STANDING_FOR_15_MINUTES: MODERATE DIFFICULTY
PUTTING_ON_SOCKS_AND_SHOES: NO DIFFICULTY AT ALL
HOS_ADL_SCORE(%): 85.94
LIGHT_TO_MODERATE_WORK: NO DIFFICULTY AT ALL
HOS_ADL_ITEM_SCORE_TOTAL: 55
WALKING_15_MINUTES_OR_GREATER: NO DIFFICULTY AT ALL
RECREATIONAL_ACTIVITIES: NO DIFFICULTY AT ALL
HOW_WOULD_YOU_RATE_YOUR_CURRENT_LEVEL_OF_FUNCTION_DURING_YOUR_USUAL_ACTIVITIES_OF_DAILY_LIVING_FROM_0_TO_100_WITH_100_BEING_YOUR_LEVEL_OF_FUNCTION_PRIOR_TO_YOUR_HIP_PROBLEM_AND_0_BEING_THE_INABILITY_TO_PERFORM_ANY_OF_YOUR_USUAL_DAILY_ACTIVITIES?: 65
HEAVY_WORK: MODERATE DIFFICULTY
STEPPING_UP_AND_DOWN_CURBS: NO DIFFICULTY AT ALL
TWISTING/PIVOTING_ON_INVOLVED_LEG: SLIGHT DIFFICULTY
WALKING_UP_STEEP_HILLS: NO DIFFICULTY AT ALL
GETTING_INTO_AND_OUT_OF_AN_AVERAGE_CAR: NO DIFFICULTY AT ALL
GOING_DOWN_1_FLIGHT_OF_STAIRS: NO DIFFICULTY AT ALL
WALKING_APPROXIMATELY_10_MINUTES: NO DIFFICULTY AT ALL
WALKING_DOWN_STEEP_HILLS: NO DIFFICULTY AT ALL
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
## 2019-01-07 ENCOUNTER — THERAPY VISIT (OUTPATIENT)
Dept: PHYSICAL THERAPY | Facility: CLINIC | Age: 74
End: 2019-01-07
Payer: COMMERCIAL

## 2019-01-07 DIAGNOSIS — M25.551 HIP PAIN, RIGHT: Primary | ICD-10-CM

## 2019-01-07 PROCEDURE — 97140 MANUAL THERAPY 1/> REGIONS: CPT | Mod: GP | Performed by: PHYSICAL THERAPIST

## 2019-01-07 PROCEDURE — 97110 THERAPEUTIC EXERCISES: CPT | Mod: GP | Performed by: PHYSICAL THERAPIST

## 2019-01-07 PROCEDURE — 97035 APP MDLTY 1+ULTRASOUND EA 15: CPT | Mod: GP | Performed by: PHYSICAL THERAPIST

## 2019-01-08 NOTE — PROGRESS NOTES
[place] [facility] Initial Evaluation  Subjective:                                     General health as reported by patient is good.  Pertinent medical history includes:  High blood pressure and depression.    Other surgeries include:  Other (Anuerisym).  Current medications:  High blood pressure medication.  Current occupation is Retired.                                    Objective:  System    Physical Exam    General     ROS    Assessment/Plan:

## 2019-01-15 ENCOUNTER — THERAPY VISIT (OUTPATIENT)
Dept: PHYSICAL THERAPY | Facility: CLINIC | Age: 74
End: 2019-01-15
Payer: COMMERCIAL

## 2019-01-15 DIAGNOSIS — M25.551 HIP PAIN, RIGHT: Primary | ICD-10-CM

## 2019-01-15 PROCEDURE — 97110 THERAPEUTIC EXERCISES: CPT | Mod: GP | Performed by: PHYSICAL THERAPIST

## 2019-01-15 PROCEDURE — 97035 APP MDLTY 1+ULTRASOUND EA 15: CPT | Mod: GP | Performed by: PHYSICAL THERAPIST

## 2019-01-15 PROCEDURE — 97140 MANUAL THERAPY 1/> REGIONS: CPT | Mod: GP | Performed by: PHYSICAL THERAPIST

## 2019-01-22 ENCOUNTER — THERAPY VISIT (OUTPATIENT)
Dept: PHYSICAL THERAPY | Facility: CLINIC | Age: 74
End: 2019-01-22
Payer: COMMERCIAL

## 2019-01-22 DIAGNOSIS — M25.551 HIP PAIN, RIGHT: Primary | ICD-10-CM

## 2019-01-22 PROCEDURE — 97110 THERAPEUTIC EXERCISES: CPT | Mod: GP | Performed by: PHYSICAL THERAPIST

## 2019-01-22 PROCEDURE — 97035 APP MDLTY 1+ULTRASOUND EA 15: CPT | Mod: GP | Performed by: PHYSICAL THERAPIST

## 2019-01-22 PROCEDURE — 97140 MANUAL THERAPY 1/> REGIONS: CPT | Mod: GP | Performed by: PHYSICAL THERAPIST

## 2019-01-29 ENCOUNTER — THERAPY VISIT (OUTPATIENT)
Dept: PHYSICAL THERAPY | Facility: CLINIC | Age: 74
End: 2019-01-29
Payer: COMMERCIAL

## 2019-01-29 DIAGNOSIS — M25.551 HIP PAIN, RIGHT: Primary | ICD-10-CM

## 2019-01-29 PROCEDURE — 97110 THERAPEUTIC EXERCISES: CPT | Mod: GP | Performed by: PHYSICAL THERAPIST

## 2019-01-29 PROCEDURE — 97140 MANUAL THERAPY 1/> REGIONS: CPT | Mod: GP | Performed by: PHYSICAL THERAPIST

## 2019-01-29 PROCEDURE — 97035 APP MDLTY 1+ULTRASOUND EA 15: CPT | Mod: GP | Performed by: PHYSICAL THERAPIST

## 2019-10-29 ENCOUNTER — HOSPITAL ENCOUNTER (OUTPATIENT)
Dept: MAMMOGRAPHY | Facility: CLINIC | Age: 74
Discharge: HOME | End: 2019-10-29
Attending: FAMILY MEDICINE | Admitting: FAMILY MEDICINE
Payer: COMMERCIAL

## 2019-10-29 DIAGNOSIS — Z12.31 VISIT FOR SCREENING MAMMOGRAM: ICD-10-CM

## 2019-10-29 PROCEDURE — 77063 BREAST TOMOSYNTHESIS BI: CPT

## 2019-11-08 ENCOUNTER — THERAPY VISIT (OUTPATIENT)
Dept: PHYSICAL THERAPY | Facility: CLINIC | Age: 74
End: 2019-11-08
Payer: COMMERCIAL

## 2019-11-08 DIAGNOSIS — M79.18 BILATERAL BUTTOCK PAIN: ICD-10-CM

## 2019-11-08 PROCEDURE — 97161 PT EVAL LOW COMPLEX 20 MIN: CPT | Mod: GP | Performed by: PHYSICAL THERAPIST

## 2019-11-08 PROCEDURE — 97112 NEUROMUSCULAR REEDUCATION: CPT | Mod: GP | Performed by: PHYSICAL THERAPIST
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
## 2019-11-15 ENCOUNTER — THERAPY VISIT (OUTPATIENT)
Dept: PHYSICAL THERAPY | Facility: CLINIC | Age: 74
End: 2019-11-15
Payer: COMMERCIAL

## 2019-11-15 DIAGNOSIS — M79.18 BILATERAL BUTTOCK PAIN: ICD-10-CM

## 2019-11-15 PROCEDURE — 97140 MANUAL THERAPY 1/> REGIONS: CPT | Mod: GP | Performed by: PHYSICAL THERAPIST

## 2019-11-15 PROCEDURE — 97110 THERAPEUTIC EXERCISES: CPT | Mod: GP | Performed by: PHYSICAL THERAPIST

## 2019-11-22 ENCOUNTER — THERAPY VISIT (OUTPATIENT)
Dept: PHYSICAL THERAPY | Facility: CLINIC | Age: 74
End: 2019-11-22
Payer: COMMERCIAL

## 2019-11-22 DIAGNOSIS — M79.18 BILATERAL BUTTOCK PAIN: ICD-10-CM

## 2019-11-22 PROCEDURE — 97140 MANUAL THERAPY 1/> REGIONS: CPT | Mod: GP | Performed by: PHYSICAL THERAPIST

## 2019-11-22 PROCEDURE — 97110 THERAPEUTIC EXERCISES: CPT | Mod: GP | Performed by: PHYSICAL THERAPIST

## 2019-12-02 ENCOUNTER — THERAPY VISIT (OUTPATIENT)
Dept: PHYSICAL THERAPY | Facility: CLINIC | Age: 74
End: 2019-12-02
Payer: COMMERCIAL

## 2019-12-02 DIAGNOSIS — M79.18 BILATERAL BUTTOCK PAIN: ICD-10-CM

## 2019-12-02 PROCEDURE — 97140 MANUAL THERAPY 1/> REGIONS: CPT | Mod: GP | Performed by: PHYSICAL THERAPIST

## 2019-12-02 PROCEDURE — 97110 THERAPEUTIC EXERCISES: CPT | Mod: GP | Performed by: PHYSICAL THERAPIST

## 2019-12-09 ENCOUNTER — THERAPY VISIT (OUTPATIENT)
Dept: PHYSICAL THERAPY | Facility: CLINIC | Age: 74
End: 2019-12-09
Payer: COMMERCIAL

## 2019-12-09 DIAGNOSIS — M79.18 BILATERAL BUTTOCK PAIN: ICD-10-CM

## 2019-12-09 PROCEDURE — 97140 MANUAL THERAPY 1/> REGIONS: CPT | Mod: GP | Performed by: PHYSICAL THERAPIST

## 2019-12-09 PROCEDURE — 97035 APP MDLTY 1+ULTRASOUND EA 15: CPT | Mod: GP | Performed by: PHYSICAL THERAPIST

## 2019-12-09 PROCEDURE — 97110 THERAPEUTIC EXERCISES: CPT | Mod: GP | Performed by: PHYSICAL THERAPIST

## 2019-12-16 ENCOUNTER — THERAPY VISIT (OUTPATIENT)
Dept: PHYSICAL THERAPY | Facility: CLINIC | Age: 74
End: 2019-12-16
Payer: COMMERCIAL

## 2019-12-16 DIAGNOSIS — M79.18 BILATERAL BUTTOCK PAIN: ICD-10-CM

## 2019-12-16 PROCEDURE — 97035 APP MDLTY 1+ULTRASOUND EA 15: CPT | Mod: GP | Performed by: PHYSICAL THERAPIST

## 2019-12-16 PROCEDURE — 97110 THERAPEUTIC EXERCISES: CPT | Mod: GP | Performed by: PHYSICAL THERAPIST

## 2019-12-23 ENCOUNTER — THERAPY VISIT (OUTPATIENT)
Dept: PHYSICAL THERAPY | Facility: CLINIC | Age: 74
End: 2019-12-23
Payer: COMMERCIAL

## 2019-12-23 DIAGNOSIS — M79.18 BILATERAL BUTTOCK PAIN: ICD-10-CM

## 2019-12-23 PROCEDURE — 97035 APP MDLTY 1+ULTRASOUND EA 15: CPT | Mod: GP | Performed by: PHYSICAL THERAPIST

## 2019-12-23 PROCEDURE — 97140 MANUAL THERAPY 1/> REGIONS: CPT | Mod: GP | Performed by: PHYSICAL THERAPIST

## 2019-12-23 PROCEDURE — 97112 NEUROMUSCULAR REEDUCATION: CPT | Mod: GP | Performed by: PHYSICAL THERAPIST

## 2019-12-30 ENCOUNTER — THERAPY VISIT (OUTPATIENT)
Dept: PHYSICAL THERAPY | Facility: CLINIC | Age: 74
End: 2019-12-30
Payer: COMMERCIAL

## 2019-12-30 DIAGNOSIS — M79.18 BILATERAL BUTTOCK PAIN: ICD-10-CM

## 2019-12-30 PROCEDURE — 97035 APP MDLTY 1+ULTRASOUND EA 15: CPT | Mod: GP | Performed by: PHYSICAL THERAPIST

## 2019-12-30 PROCEDURE — 97140 MANUAL THERAPY 1/> REGIONS: CPT | Mod: GP | Performed by: PHYSICAL THERAPIST

## 2019-12-30 NOTE — LETTER
[facility]  [address]  [place] [address]  [phone]    2019    Re: Batsheva Fuentes   :   1945  MRN:  [id number]   REFERRING PHYSICIAN:   Rishi Hernandez    [facility]    Date of Initial Evaluation:  19  Visits:  Rxs Used: 8  Reason for Referral:  Bilateral buttock pain    PROGRESS  REPORT  Progress reporting period is from 19 to 19.       SUBJECTIVE  Subjective changes noted by patient:  Subjective: Patient doing good improvement in bilateral hip pain.  Is tolerating all exercises well.  Still noting some focal pain in bilateral lateral hip.    Current pain level is        Previous pain level was   Initial Pain level: 610.   Changes in function:  Yes (See Goal flowsheet attached for changes in current functional level)  Adverse reaction to treatment or activity: None    OBJECTIVE  Changes noted in objective findings:    Objective: Positive straight leg raise on the right at approximately 70 degrees.  More tenderness on the left with palpation at greater trochanter.  He is independent with home exercises.  Patient to work on exercises for 2 weeks and will follow-up.     ASSESSMENT/PLAN  Updated problem list and treatment plan: Diagnosis 1:  Bilateral buttock pain  Pain -  manual therapy, self management, education and home program  Decreased ROM/flexibility - manual therapy and therapeutic exercise  Decreased joint mobility - manual therapy and therapeutic exercise  Decreased strength - therapeutic exercise and therapeutic activities  Impaired gait - gait training  Impaired muscle performance - neuro re-education  Decreased function - therapeutic activities  STG/LTGs have been met or progress has been made towards goals:  Yes (See Goal flow sheet completed today.)  Assessment of Progress: The patient's condition is improving.  Self Management Plans:  Patient  has been instructed in a home treatment program.  Patient  has been instructed in self management of symptoms.    Batsheva continues to require the following intervention to meet STG and LTG's:  PT    Re: Batsheva Fuentes   :   1945    Recommendations:  This patient would benefit from continued therapy.     Frequency:  1 X week, once daily  Duration:  for 4 weeks    Thank you for your referral.    INQUIRIES  Therapist: Carla Gee, PT, ScD, [facility]   [facility] [place] [facility]  [address] [place] [id number]  Phone: [phone]  Fax: [phone]

## 2019-12-30 NOTE — PROGRESS NOTES
Subjective:  HPI                    Objective:  System    Physical Exam    General     ROS    Assessment/Plan:    PROGRESS  REPORT    Progress reporting period is from 11/8/19 to 12/30/19.       SUBJECTIVE  Subjective changes noted by patient:  .  Subjective: Patient doing good improvement in bilateral hip pain.  Is tolerating all exercises well.  Still noting some focal pain in bilateral lateral hip.    Current pain level is   .     Previous pain level was   Initial Pain level: 6/10.   Changes in function:  Yes (See Goal flowsheet attached for changes in current functional level)  Adverse reaction to treatment or activity: None    OBJECTIVE  Changes noted in objective findings:    Objective: Positive straight leg raise on the right at approximately 70 degrees.  More tenderness on the left with palpation at greater trochanter.  He is independent with home exercises.  Patient to work on exercises for 2 weeks and will follow-up.     ASSESSMENT/PLAN  Updated problem list and treatment plan: Diagnosis 1:  Bilateral buttock pain  Pain -  manual therapy, self management, education and home program  Decreased ROM/flexibility - manual therapy and therapeutic exercise  Decreased joint mobility - manual therapy and therapeutic exercise  Decreased strength - therapeutic exercise and therapeutic activities  Impaired gait - gait training  Impaired muscle performance - neuro re-education  Decreased function - therapeutic activities  STG/LTGs have been met or progress has been made towards goals:  Yes (See Goal flow sheet completed today.)  Assessment of Progress: The patient's condition is improving.  Self Management Plans:  Patient has been instructed in a home treatment program.  Patient  has been instructed in self management of symptoms.    Batsheva continues to require the following intervention to meet STG and LTG's:  PT    Recommendations:  This patient would benefit from continued therapy.     Frequency:  1 X week, once  daily  Duration:  for 4 weeks        Please refer to the daily flowsheet for treatment today, total treatment time and time spent performing 1:1 timed codes.

## 2020-01-16 ENCOUNTER — THERAPY VISIT (OUTPATIENT)
Dept: PHYSICAL THERAPY | Facility: CLINIC | Age: 75
End: 2020-01-16
Payer: COMMERCIAL

## 2020-01-16 DIAGNOSIS — M79.18 BILATERAL BUTTOCK PAIN: ICD-10-CM

## 2020-01-16 PROCEDURE — 97110 THERAPEUTIC EXERCISES: CPT | Mod: GP | Performed by: PHYSICAL THERAPIST

## 2020-07-06 ENCOUNTER — THERAPY VISIT (OUTPATIENT)
Dept: PHYSICAL THERAPY | Facility: CLINIC | Age: 75
End: 2020-07-06
Payer: COMMERCIAL

## 2020-07-06 DIAGNOSIS — M54.2 CERVICALGIA: ICD-10-CM

## 2020-07-06 PROCEDURE — 97161 PT EVAL LOW COMPLEX 20 MIN: CPT | Mod: GP | Performed by: PHYSICAL THERAPIST

## 2020-07-06 PROCEDURE — 97140 MANUAL THERAPY 1/> REGIONS: CPT | Mod: GP | Performed by: PHYSICAL THERAPIST
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
## 2020-07-06 NOTE — LETTER
[facility] ATHLETIC [facility] PHYSICAL [facility]  [address]  [facility] [address]  [phone]    2020    Re: Batsheva Fuentes   :   1945  MRN:  [id number]   REFERRING PHYSICIAN:   Malini Aleman    [facility]THLETIC [facility] PHYSICAL [facility]  Date of Initial Evaluation:  20  Visits:  Rxs Used: 1  Reason for Referral:  Cervicalgia    EVALUATION SUMMARY    [facility] Initial Evaluation  Subjective:  Batsheva Fuentes is a 74 year old female.    Patient s chief complaints: neck pain.    Condition occurred due to a fall.  Was fishing and helping grandson get of of the boat, fell and landed on L hip/buttock.  Had head and neck pain remaining after.  Date of Onset: around 20  Location of symptoms is suboccipitals, posterior neck, upper traps, clavicle areas B sides. .  Symptoms other than pain include: stiffness; denies any numbness/tingling/weakness   Quality of pain is aching/throbbing and frequency is intermittent.    Pain dependence on time of day is: worse in morning.   Pain rating is: 8/10.    Symptoms are exacerbated by: lifting, reading, driving, sleeping   Symptoms are relieved by:  Heat, ice, hot tub, extra strength tylenol  Progression of symptoms is that symptoms are:  unchanged.  Imaging/Special tests include: none   Previous treatments include: did do chiro with drop table, but had more trouble the 2 days after.  Wasn't able to get in to see her massage therapist..   Patient reports that general health is: good.   Pertinent medical history includes:  Depression, HBP, hx of brain aneurysm with surgery (didn't directly involve the neck).    Medical allergies includes: none.   Surgical history includes: none.  Current medications include: anti-depressant, HBP, mm relaxant  Current occupation is: retired  Work status is: retired  Primary job tasks include: none  Barriers include: none  Red flags include: none  Patient's  expectations for therapy include: reduce pain            Objective:  CERVICAL:  Posture: fair  Posture Correction: not assessed          Re: Batsheva Fuentes   :   1945          Neurological:    Motor Deficit:  Myotomes L R   C4 (shoulder elevation) 5 5   C5 (shoulder abduction) 5 5   C6 (elbow flexion) 5 5   C7 (elbow extension) 5 5   C8 (thumb extension) 5 5   T1 (finger add/abd) 5 5    Strength (lb) WNL WNL     Sensory Deficit, Reflexes, Dural Signs: intact light touch screen B UE dermatomes    AROM: (Major, Moderate, Minimal or Nil loss)  Movement Loss Andrea Mod Min Nil Pain   Protrusion    X Pain suboccipital   Flexion    X No effect   Retraction   X  No effect   Extension   X  Pain posterior L neck   Left Rotation   X  Just tight   Right Rotation   X  Just tight   Left Side Bending   X  Just tight   Right Side bending   X  Just tight     Repeated movement testing:   (During: produces, abolishes, increases, decreases, no effect, centralizing, peripheralizing; After: better, worse, no better, no worse, no effect, centralized, peripheralized)    Pre-test Symptoms Sitting: pain post neck/suboccipital   Symptoms During Symptoms After ROM increased ROM decreased No Effect   PRO        Rep PRO        RET        Rep RET discomfort during first few reps, lessens with repetition Slightly better   X   RET EXT        Rep RET EXT        LF - R        Rep LF - R        LF - L        Rep LF - L        ROT - R        Rep ROT - R        ROT - L        Rep ROT - L        FLEX        Rep FLEX        Re: Batsheva Fuentes   :   1945          Other Tests: palpation very tight suboccipital muscles L>R.  L splenius capitus lateral neck and R proximal scalene just posterior to R ear, all very tight as well.   Provisional Classification: other, trauma  Principle of Management: will initiate retraction in sitting, suboccipital release, soft tissue work, posture.       Assessment/Plan:    Patient is a 74 year old  female with cervical complaints.    Patient has the following significant findings with corresponding treatment plan.                Diagnosis 1:  cervicalgia    Pain -  hot/cold therapy, manual therapy and directional preference exercise  Decreased ROM/flexibility - manual therapy and therapeutic exercise  Decreased strength - therapeutic exercise and therapeutic activities  Decreased proprioception - neuro re-education and therapeutic activities  Decreased function - therapeutic activities  Impaired posture - neuro re-education    Therapy Evaluation Codes:   1) History comprised of:   Personal factors that impact the plan of care:      None.    Comorbidity factors that impact the plan of care are:      None.     Medications impacting care: None.  2) Examination of Body Systems comprised of:   Body structures and functions that impact the plan of care:      Cervical spine.   Activity limitations that impact the plan of care are:      Driving, Dressing, Lifting, Reading/Computer work and Sleeping.  3) Clinical presentation characteristics are:   Stable/Uncomplicated.  4) Decision-Making    Low complexity using standardized patient assessment instrument and/or measureable assessment of functional outcome.  Cumulative Therapy Evaluation is: Low complexity.    Previous and current functional limitations:  (See Goal Flow Sheet for this information)    Short term and Long term goals: (See Goal Flow Sheet for this information)     Communication ability:  Patient appears to be able to clearly communicate and understand verbal and written communication and follow directions correctly.  Treatment Explanation - The following has been discussed with the patient:   RX ordered/plan of care  Anticipated outcomes  Possible risks and side effects  This patient would benefit from PT intervention to resume normal activities.   Rehab potential is good.            Re: Batsheva Fuentes   :   1945            Frequency:  1 X  week, once daily  Duration:  for 6 weeks  Discharge Plan:  Achieve all LTG.  Independent in home treatment program.  Reach maximal therapeutic benefit.    Thank you for your referral.    INQUIRIES  Therapist: Jarrett Long    [facility]  [address] [place], [place] [id number]  [facility] [address]  Phone: [phone]  Fax: [phone]

## 2020-07-13 ENCOUNTER — THERAPY VISIT (OUTPATIENT)
Dept: PHYSICAL THERAPY | Facility: CLINIC | Age: 75
End: 2020-07-13
Payer: COMMERCIAL

## 2020-07-13 DIAGNOSIS — M54.2 CERVICALGIA: ICD-10-CM

## 2020-07-13 PROCEDURE — 97140 MANUAL THERAPY 1/> REGIONS: CPT | Mod: GP | Performed by: PHYSICAL THERAPIST

## 2020-07-13 PROCEDURE — 97110 THERAPEUTIC EXERCISES: CPT | Mod: GP | Performed by: PHYSICAL THERAPIST

## 2020-07-27 ENCOUNTER — THERAPY VISIT (OUTPATIENT)
Dept: PHYSICAL THERAPY | Facility: CLINIC | Age: 75
End: 2020-07-27
Payer: COMMERCIAL

## 2020-07-27 DIAGNOSIS — M54.2 CERVICALGIA: Primary | ICD-10-CM

## 2020-07-27 PROCEDURE — 97140 MANUAL THERAPY 1/> REGIONS: CPT | Mod: GP | Performed by: PHYSICAL THERAPIST

## 2020-07-27 PROCEDURE — 97110 THERAPEUTIC EXERCISES: CPT | Mod: GP | Performed by: PHYSICAL THERAPIST

## 2020-08-03 ENCOUNTER — THERAPY VISIT (OUTPATIENT)
Dept: PHYSICAL THERAPY | Facility: CLINIC | Age: 75
End: 2020-08-03
Payer: COMMERCIAL

## 2020-08-03 DIAGNOSIS — M54.2 CERVICALGIA: ICD-10-CM

## 2020-08-03 PROCEDURE — 97140 MANUAL THERAPY 1/> REGIONS: CPT | Mod: GP | Performed by: PHYSICAL THERAPIST

## 2020-08-03 PROCEDURE — 97112 NEUROMUSCULAR REEDUCATION: CPT | Mod: GP | Performed by: PHYSICAL THERAPIST

## 2020-08-17 ENCOUNTER — THERAPY VISIT (OUTPATIENT)
Dept: PHYSICAL THERAPY | Facility: CLINIC | Age: 75
End: 2020-08-17
Payer: COMMERCIAL

## 2020-08-17 DIAGNOSIS — M54.2 CERVICALGIA: ICD-10-CM

## 2020-08-17 PROCEDURE — 97112 NEUROMUSCULAR REEDUCATION: CPT | Mod: GP | Performed by: PHYSICAL THERAPIST

## 2020-08-17 PROCEDURE — 97140 MANUAL THERAPY 1/> REGIONS: CPT | Mod: GP | Performed by: PHYSICAL THERAPIST

## 2020-08-28 ENCOUNTER — THERAPY VISIT (OUTPATIENT)
Dept: PHYSICAL THERAPY | Facility: CLINIC | Age: 75
End: 2020-08-28
Payer: COMMERCIAL

## 2020-08-28 DIAGNOSIS — M54.2 CERVICALGIA: ICD-10-CM

## 2020-08-28 PROCEDURE — 97110 THERAPEUTIC EXERCISES: CPT | Mod: GP | Performed by: PHYSICAL THERAPIST

## 2020-08-28 PROCEDURE — 97112 NEUROMUSCULAR REEDUCATION: CPT | Mod: GP | Performed by: PHYSICAL THERAPIST

## 2020-08-28 NOTE — PROGRESS NOTES
DISCHARGE REPORT    Progress reporting period is from 7/6/20 to 8/28/20.       SUBJECTIVE   Subjective: Reports went to an orthopedist and had an x-ray which should arthritis in her right knee. Had a cortisone shot and is feeling much better. Noted no complaints of neck pain anymore and only occasional tips of shoulder pain.      Current Pain level: 0/10.      Initial Pain level: 8/10.   Changes in function: Patient able to perform all ADL's without c/o pain.   Adverse reaction to treatment or activity: None    OBJECTIVE  Changes noted in objective findings:  The objective findings below are from DOS 8/28/20.  Objective: AROM of cervical spine full without c/o pain. Full AROM of bilateral shoulders. Able to perform ADL's without any difficulties.      ASSESSMENT/PLAN  Updated problem list and treatment plan: Diagnosis 1:  Neck pain  All problems have been resolved. Treatment plan is to continue with HEP.  STG/LTGs have been met or progress has been made towards goals:  Yes (See Goal flow sheet completed today.)  Assessment of Progress: The patient has met all of their long term goals.  Self Management Plans:  Patient has been instructed in a home treatment program.  Patient is independent in a home treatment program.  Patient  has been instructed in self management of symptoms.  Patient is independent in self management of symptoms.  I have re-evaluated this patient and find that the nature, scope, duration and intensity of the therapy is appropriate for the medical condition of the patient.  Batsheva continues to require the following intervention to meet STG and LTG's:  PT intervention is no longer required to meet STG/LTG.    Recommendations:  This patient is ready to be discharged from therapy and continue their home treatment program.    Please refer to the daily flowsheet for treatment today, total treatment time and time spent performing 1:1 timed codes.

## 2020-11-16 ENCOUNTER — THERAPY VISIT (OUTPATIENT)
Dept: PHYSICAL THERAPY | Facility: CLINIC | Age: 75
End: 2020-11-16
Payer: COMMERCIAL

## 2020-11-16 DIAGNOSIS — M25.562 BILATERAL KNEE PAIN: ICD-10-CM

## 2020-11-16 DIAGNOSIS — M25.561 BILATERAL KNEE PAIN: ICD-10-CM

## 2020-11-16 PROCEDURE — 97161 PT EVAL LOW COMPLEX 20 MIN: CPT | Mod: GP | Performed by: PHYSICAL THERAPIST

## 2020-11-16 PROCEDURE — 97110 THERAPEUTIC EXERCISES: CPT | Mod: GP | Performed by: PHYSICAL THERAPIST

## 2020-11-16 ASSESSMENT — ACTIVITIES OF DAILY LIVING (ADL)
HOW_WOULD_YOU_RATE_THE_OVERALL_FUNCTION_OF_YOUR_KNEE_DURING_YOUR_USUAL_DAILY_ACTIVITIES?: NEARLY NORMAL
RAW_SCORE: 46
WALK: ACTIVITY IS SOMEWHAT DIFFICULT
AS_A_RESULT_OF_YOUR_KNEE_INJURY,_HOW_WOULD_YOU_RATE_YOUR_CURRENT_LEVEL_OF_DAILY_ACTIVITY?: NEARLY NORMAL
RISE FROM A CHAIR: ACTIVITY IS NOT DIFFICULT
KNEE_ACTIVITY_OF_DAILY_LIVING_SUM: 46
PAIN: THE SYMPTOM AFFECTS MY ACTIVITY SLIGHTLY
SIT WITH YOUR KNEE BENT: ACTIVITY IS SOMEWHAT DIFFICULT
KNEEL ON THE FRONT OF YOUR KNEE: ACTIVITY IS VERY DIFFICULT
GO DOWN STAIRS: ACTIVITY IS MINIMALLY DIFFICULT
GIVING WAY, BUCKLING OR SHIFTING OF KNEE: I HAVE THE SYMPTOM BUT IT DOES NOT AFFECT MY ACTIVITY
STAND: ACTIVITY IS MINIMALLY DIFFICULT
SWELLING: THE SYMPTOM AFFECTS MY ACTIVITY SLIGHTLY
WEAKNESS: THE SYMPTOM AFFECTS MY ACTIVITY SLIGHTLY
KNEE_ACTIVITY_OF_DAILY_LIVING_SCORE: 65.71
STIFFNESS: THE SYMPTOM AFFECTS MY ACTIVITY SLIGHTLY
GO UP STAIRS: ACTIVITY IS MINIMALLY DIFFICULT
LIMPING: I DO NOT HAVE THE SYMPTOM
SQUAT: ACTIVITY IS VERY DIFFICULT

## 2020-11-16 NOTE — LETTER
[facility]IC [facility]YSICAL [facility]  [address]  [facility] [address]  [phone]    2020    Re: Batsheva Fuentes   :   1945  MRN:  [id number]   REFERRING PHYSICIAN:   Nora Moore    [facility] PHYSICAL [facility]    Date of Initial Evaluation: 20  Visits:  Rxs Used: 1  Reason for Referral:  Bilateral knee pain    EVALUATION SUMMARY    [facility] Initial Evaluation  Subjective:  Patient presents to PT with referral for bilateral knee pain/OA dated 11/3/2020.  Patient reports falling when her grandson was lifting her out of a boat.  She states she has had multiple joint soreness since then.  She complains of bilateral anterior and medial knee pain which is worse with stairs, squatting and kneeling and walking.  She reports 2 injections on the right knee, on on the left.    The history is provided by the patient.     Patient Health History  Batsheva Fuentes being seen for knees.   Problem began: 2020.   Problem occurred: fell out of a boat   Pain is reported as 6/10 on pain scale.    Pertinent medical history includes: high blood pressure and osteoarthritis. Other medical history details: polymyalgia rheumatica.   Other surgery history details: aneurysm, hysterectomy.    Current medications:  High blood pressure medication.    Current occupation is retired.                Objective:  Gait:    Gait Type:  Normal   Assistive Devices:  None     Knee Evaluation:  ROM:  Strength:  Normal  AROM  Hyperextension:  Left:  8    Right: 8  Extension:  Left: 0    Right:  0  Flexion: Left: 136    Right: 140  Ligament Testing:  Normal  Palpation:    Left knee tenderness present at:  Lateral Joint Line  Left knee tenderness not present at:  Medial Joint Line; Patellar Tendon; Patellar   Re: Batsheva Fuentes   :   1945    Medial; Patellar Lateral; Patellar Superior and Patellar Inferior  Right  knee tenderness present at:  Lateral Joint Line  Right knee tenderness not present at:  Medial Joint Line; Patellar Tendon; Patellar Medial; Patellar Lateral; Patellar Superior and Patellar Inferior  Edema:  Normal  Functional Testing:    Core Strength:    Single Leg Bridge: Left:  23 sec/20 reps    Right: 5 sec/20 reps    % of Uninvolved:   Quad:    Single Leg Squat:  Left:      NA   Right:      NA   Bilateral Leg Squat:   Moderate loss of control    Patient turns sideways to accomplish front step down from 8 in stool bilat.  General   ROS    Assessment/Plan:    Patient is a 74 year old female with both sides knee complaints.    Patient has the following significant findings with corresponding treatment plan.                Diagnosis 1:  Bilateral knee pain/ OA  Pain -  manual therapy, self management, education and home program  Decreased function - therapeutic activities      Therapy Evaluation Codes:   1) History comprised of:   Personal factors that impact the plan of care:      None.    Comorbidity factors that impact the plan of care are:      None.     Medications impacting care: None.  2) Examination of Body Systems comprised of:   Body structures and functions that impact the plan of care:      Knee.   Activity limitations that impact the plan of care are:        3) Clinical presentation characteristics are:   Stable/Uncomplicated.  4) Decision-Making    Low complexity using standardized patient assessment instrument and/or measureable assessment of functional outcome.  Cumulative Therapy Evaluation is: Low complexity.    Communication ability:  Patient appears to be able to clearly communicate and understand verbal and written communication and follow directions correctly.  Treatment Explanation - The following has been discussed with the patient:   RX ordered/plan of care  Anticipated outcomes  Possible risks and side effects  Re: Batsheva Fuentes   :   1945    This patient would benefit from  PT intervention to resume normal activities.   Rehab potential is good.  Frequency:  1 X week, once daily  Duration:  for 8 weeks  Discharge Plan:  Achieve all LTG.  Independent in home treatment program.  Reach maximal therapeutic benefit.    Thank you for your referral.    INQUIRIES  Therapist: Laura Michael  PT [facility]   [facility]  [address] [place] [address] [id number]  [facility] [address]  Phone: [phone]  Fax: [phone]

## 2020-11-16 NOTE — PROGRESS NOTES
[facility] Initial Evaluation  Subjective:  Patient presents to PT with referral for bilateral knee pain/OA dated 11/3/2020.  Patient reports falling when her grandson was lifting her out of a boat.  She states she has had multiple joint soreness since then.  She complains of bilateral anterior and medial knee pain which is worse with stairs, squatting and kneeling and walking.  She reports 2 injections on the right knee, on on the left.    The history is provided by the patient.   Patient Health History  Batsheva Fuentes being seen for knees.     Problem began: 6/8/2020.   Problem occurred: fell out of a boat   Pain is reported as 6/10 on pain scale.    Pertinent medical history includes: high blood pressure and osteoarthritis. Other medical history details: polymyalgia rheumatica.         Other surgery history details: aneurysm, hysterectomy.    Current medications:  High blood pressure medication.    Current occupation is retired.                                       Objective:    Gait:    Gait Type:  Normal   Assistive Devices:  None                                                        Knee Evaluation:  ROM:  Strength:  Normal  AROM    Hyperextension:  Left:  8    Right: 8  Extension:  Left: 0    Right:  0  Flexion: Left: 136    Right: 140          Ligament Testing:  Normal                  Palpation:    Left knee tenderness present at:  Lateral Joint Line  Left knee tenderness not present at:  Medial Joint Line; Patellar Tendon; Patellar Medial; Patellar Lateral; Patellar Superior and Patellar Inferior  Right knee tenderness present at:  Lateral Joint Line  Right knee tenderness not present at:  Medial Joint Line; Patellar Tendon; Patellar Medial; Patellar Lateral; Patellar Superior and Patellar Inferior  Edema:  Normal      Functional Testing:        Core Strength:    Single Leg Bridge: Left:  23 sec/20 reps    Right: 5 sec/20 reps    % of Uninvolved:   Quad:    Single Leg  Squat:  Left:      NA   Right:      NA   Bilateral Leg Squat:   Moderate loss of control            Patient turns sideways to accomplish front step down from 8 in stool bilat.    General     ROS    Assessment/Plan:    Patient is a 74 year old female with both sides knee complaints.    Patient has the following significant findings with corresponding treatment plan.                Diagnosis 1:  Bilateral knee pain/ OA  Pain -  manual therapy, self management, education and home program  Decreased function - therapeutic activities      Therapy Evaluation Codes:   1) History comprised of:   Personal factors that impact the plan of care:      None.    Comorbidity factors that impact the plan of care are:      None.     Medications impacting care: None.  2) Examination of Body Systems comprised of:   Body structures and functions that impact the plan of care:      Knee.   Activity limitations that impact the plan of care are:        3) Clinical presentation characteristics are:   Stable/Uncomplicated.  4) Decision-Making    Low complexity using standardized patient assessment instrument and/or measureable assessment of functional outcome.  Cumulative Therapy Evaluation is: Low complexity.    Previous and current functional limitations:  (See Goal Flow Sheet for this information)    Short term and Long term goals: (See Goal Flow Sheet for this information)     Communication ability:  Patient appears to be able to clearly communicate and understand verbal and written communication and follow directions correctly.  Treatment Explanation - The following has been discussed with the patient:   RX ordered/plan of care  Anticipated outcomes  Possible risks and side effects  This patient would benefit from PT intervention to resume normal activities.   Rehab potential is good.    Frequency:  1 X week, once daily  Duration:  for 8 weeks  Discharge Plan:  Achieve all LTG.  Independent in home treatment program.  Reach maximal  therapeutic benefit.    Please refer to the daily flowsheet for treatment today, total treatment time and time spent performing 1:1 timed codes.

## 2020-11-17 NOTE — PROGRESS NOTES
[facility] Initial Evaluation  Subjective:    Patient Health History             Pertinent medical history includes: high blood pressure and osteoarthritis (polymyolgia).        Surgeries include:  Other (Anneurism, hysterectomy).    Current medications:  High blood pressure medication.    Current occupation is Retired.   Primary job tasks: Housewife.                                    Objective:  System    Physical Exam    General     ROS    Assessment/Plan:

## 2020-11-24 ENCOUNTER — THERAPY VISIT (OUTPATIENT)
Dept: PHYSICAL THERAPY | Facility: CLINIC | Age: 75
End: 2020-11-24
Payer: COMMERCIAL

## 2020-11-24 DIAGNOSIS — M25.562 BILATERAL KNEE PAIN: ICD-10-CM

## 2020-11-24 DIAGNOSIS — M25.561 BILATERAL KNEE PAIN: ICD-10-CM

## 2020-11-24 PROCEDURE — 97110 THERAPEUTIC EXERCISES: CPT | Mod: GP | Performed by: PHYSICAL THERAPIST

## 2020-11-24 PROCEDURE — 97140 MANUAL THERAPY 1/> REGIONS: CPT | Mod: GP | Performed by: PHYSICAL THERAPIST

## 2020-12-02 ENCOUNTER — THERAPY VISIT (OUTPATIENT)
Dept: PHYSICAL THERAPY | Facility: CLINIC | Age: 75
End: 2020-12-02
Payer: COMMERCIAL

## 2020-12-02 DIAGNOSIS — M25.562 BILATERAL KNEE PAIN: ICD-10-CM

## 2020-12-02 DIAGNOSIS — M25.561 BILATERAL KNEE PAIN: ICD-10-CM

## 2020-12-02 PROCEDURE — 97110 THERAPEUTIC EXERCISES: CPT | Mod: GP | Performed by: PHYSICAL THERAPIST

## 2020-12-17 ENCOUNTER — NURSE TRIAGE (OUTPATIENT)
Dept: NURSING | Facility: CLINIC | Age: 75
End: 2020-12-17

## 2020-12-17 ENCOUNTER — HOSPITAL ENCOUNTER (EMERGENCY)
Facility: CLINIC | Age: 75
Discharge: HOME | End: 2020-12-17
Attending: EMERGENCY MEDICINE | Admitting: EMERGENCY MEDICINE
Payer: COMMERCIAL

## 2020-12-17 ENCOUNTER — APPOINTMENT (OUTPATIENT)
Dept: CT IMAGING | Facility: CLINIC | Age: 75
End: 2020-12-17
Attending: EMERGENCY MEDICINE
Payer: COMMERCIAL

## 2020-12-17 ENCOUNTER — APPOINTMENT (OUTPATIENT)
Dept: GENERAL RADIOLOGY | Facility: CLINIC | Age: 75
End: 2020-12-17
Attending: EMERGENCY MEDICINE
Payer: COMMERCIAL

## 2020-12-17 VITALS
OXYGEN SATURATION: 96 % | RESPIRATION RATE: 24 BRPM | TEMPERATURE: 99.3 F | HEART RATE: 93 BPM | DIASTOLIC BLOOD PRESSURE: 62 MMHG | WEIGHT: 139 LBS | BODY MASS INDEX: 21.82 KG/M2 | HEIGHT: 67 IN | SYSTOLIC BLOOD PRESSURE: 119 MMHG

## 2020-12-17 DIAGNOSIS — K52.9 NON-SPECIFIC COLITIS: ICD-10-CM

## 2020-12-17 DIAGNOSIS — M35.3 PMR (POLYMYALGIA RHEUMATICA) (H): ICD-10-CM

## 2020-12-17 LAB
ALBUMIN SERPL-MCNC: 2.6 G/DL (ref 3.4–5)
ALBUMIN UR-MCNC: 100 MG/DL
ALP SERPL-CCNC: 90 U/L (ref 40–150)
ALT SERPL W P-5'-P-CCNC: 10 U/L (ref 0–50)
ANION GAP SERPL CALCULATED.3IONS-SCNC: 8 MMOL/L (ref 3–14)
APPEARANCE UR: ABNORMAL
AST SERPL W P-5'-P-CCNC: 8 U/L (ref 0–45)
BASOPHILS # BLD AUTO: 0.1 10E9/L (ref 0–0.2)
BASOPHILS NFR BLD AUTO: 0.5 %
BILIRUB SERPL-MCNC: 0.6 MG/DL (ref 0.2–1.3)
BILIRUB UR QL STRIP: ABNORMAL
BUN SERPL-MCNC: 16 MG/DL (ref 7–30)
CALCIUM SERPL-MCNC: 9.3 MG/DL (ref 8.5–10.1)
CHLORIDE SERPL-SCNC: 98 MMOL/L (ref 94–109)
CK SERPL-CCNC: 18 U/L (ref 30–225)
CO2 SERPL-SCNC: 29 MMOL/L (ref 20–32)
COLOR UR AUTO: ABNORMAL
CREAT SERPL-MCNC: 0.78 MG/DL (ref 0.52–1.04)
CRP SERPL-MCNC: 172 MG/L (ref 0–8)
DIFFERENTIAL METHOD BLD: ABNORMAL
EOSINOPHIL # BLD AUTO: 0 10E9/L (ref 0–0.7)
EOSINOPHIL NFR BLD AUTO: 0.3 %
ERYTHROCYTE [DISTWIDTH] IN BLOOD BY AUTOMATED COUNT: 14.6 % (ref 10–15)
ERYTHROCYTE [SEDIMENTATION RATE] IN BLOOD BY WESTERGREN METHOD: 83 MM/H (ref 0–30)
GFR SERPL CREATININE-BSD FRML MDRD: 73 ML/MIN/{1.73_M2}
GLUCOSE SERPL-MCNC: 110 MG/DL (ref 70–99)
GLUCOSE UR STRIP-MCNC: NEGATIVE MG/DL
HCT VFR BLD AUTO: 34.8 % (ref 35–47)
HGB BLD-MCNC: 11.2 G/DL (ref 11.7–15.7)
HGB UR QL STRIP: ABNORMAL
IMM GRANULOCYTES # BLD: 0.1 10E9/L (ref 0–0.4)
IMM GRANULOCYTES NFR BLD: 0.4 %
KETONES UR STRIP-MCNC: 10 MG/DL
LEUKOCYTE ESTERASE UR QL STRIP: ABNORMAL
LYMPHOCYTES # BLD AUTO: 0.9 10E9/L (ref 0.8–5.3)
LYMPHOCYTES NFR BLD AUTO: 7.7 %
MCH RBC QN AUTO: 26.5 PG (ref 26.5–33)
MCHC RBC AUTO-ENTMCNC: 32.2 G/DL (ref 31.5–36.5)
MCV RBC AUTO: 83 FL (ref 78–100)
MONOCYTES # BLD AUTO: 1.3 10E9/L (ref 0–1.3)
MONOCYTES NFR BLD AUTO: 11.6 %
MUCOUS THREADS #/AREA URNS LPF: PRESENT /LPF
NEUTROPHILS # BLD AUTO: 9.1 10E9/L (ref 1.6–8.3)
NEUTROPHILS NFR BLD AUTO: 79.5 %
NITRATE UR QL: NEGATIVE
NRBC # BLD AUTO: 0 10*3/UL
NRBC BLD AUTO-RTO: 0 /100
PH UR STRIP: 6 PH (ref 5–7)
PLATELET # BLD AUTO: 494 10E9/L (ref 150–450)
POTASSIUM SERPL-SCNC: 3.1 MMOL/L (ref 3.4–5.3)
PROT SERPL-MCNC: 7 G/DL (ref 6.8–8.8)
RBC # BLD AUTO: 4.22 10E12/L (ref 3.8–5.2)
RBC #/AREA URNS AUTO: 81 /HPF (ref 0–2)
SODIUM SERPL-SCNC: 135 MMOL/L (ref 133–144)
SOURCE: ABNORMAL
SP GR UR STRIP: 1.03 (ref 1–1.03)
SQUAMOUS #/AREA URNS AUTO: 2 /HPF (ref 0–1)
TSH SERPL DL<=0.005 MIU/L-ACNC: 1.73 MU/L (ref 0.4–4)
UROBILINOGEN UR STRIP-MCNC: 4 MG/DL (ref 0–2)
WBC # BLD AUTO: 11.4 10E9/L (ref 4–11)
WBC #/AREA URNS AUTO: 5 /HPF (ref 0–5)

## 2020-12-17 PROCEDURE — 81001 URINALYSIS AUTO W/SCOPE: CPT | Performed by: EMERGENCY MEDICINE

## 2020-12-17 PROCEDURE — 96361 HYDRATE IV INFUSION ADD-ON: CPT

## 2020-12-17 PROCEDURE — 250N000011 HC RX IP 250 OP 636: Performed by: EMERGENCY MEDICINE

## 2020-12-17 PROCEDURE — 84443 ASSAY THYROID STIM HORMONE: CPT | Performed by: EMERGENCY MEDICINE

## 2020-12-17 PROCEDURE — 71045 X-RAY EXAM CHEST 1 VIEW: CPT

## 2020-12-17 PROCEDURE — 250N000009 HC RX 250: Performed by: EMERGENCY MEDICINE

## 2020-12-17 PROCEDURE — 85025 COMPLETE CBC W/AUTO DIFF WBC: CPT | Performed by: EMERGENCY MEDICINE

## 2020-12-17 PROCEDURE — 85652 RBC SED RATE AUTOMATED: CPT | Performed by: EMERGENCY MEDICINE

## 2020-12-17 PROCEDURE — 96374 THER/PROPH/DIAG INJ IV PUSH: CPT | Mod: 59

## 2020-12-17 PROCEDURE — 80053 COMPREHEN METABOLIC PANEL: CPT | Performed by: EMERGENCY MEDICINE

## 2020-12-17 PROCEDURE — 86140 C-REACTIVE PROTEIN: CPT | Performed by: EMERGENCY MEDICINE

## 2020-12-17 PROCEDURE — 99285 EMERGENCY DEPT VISIT HI MDM: CPT | Mod: 25

## 2020-12-17 PROCEDURE — 74177 CT ABD & PELVIS W/CONTRAST: CPT

## 2020-12-17 PROCEDURE — 258N000003 HC RX IP 258 OP 636: Performed by: EMERGENCY MEDICINE

## 2020-12-17 PROCEDURE — 82550 ASSAY OF CK (CPK): CPT | Performed by: EMERGENCY MEDICINE

## 2020-12-17 RX ORDER — PREDNISONE 20 MG/1
TABLET ORAL
Qty: 9 TABLET | Refills: 0 | Status: SHIPPED | OUTPATIENT
Start: 2020-12-17 | End: 2020-12-27

## 2020-12-17 RX ORDER — METHYLPREDNISOLONE SODIUM SUCCINATE 40 MG/ML
20 INJECTION, POWDER, LYOPHILIZED, FOR SOLUTION INTRAMUSCULAR; INTRAVENOUS ONCE
Status: COMPLETED | OUTPATIENT
Start: 2020-12-17 | End: 2020-12-17

## 2020-12-17 RX ORDER — SODIUM CHLORIDE 9 MG/ML
INJECTION, SOLUTION INTRAVENOUS CONTINUOUS
Status: DISCONTINUED | OUTPATIENT
Start: 2020-12-17 | End: 2020-12-17 | Stop reason: HOSPADM

## 2020-12-17 RX ORDER — IOPAMIDOL 755 MG/ML
70 INJECTION, SOLUTION INTRAVASCULAR ONCE
Status: COMPLETED | OUTPATIENT
Start: 2020-12-17 | End: 2020-12-17

## 2020-12-17 RX ADMIN — SODIUM CHLORIDE 500 ML: 9 INJECTION, SOLUTION INTRAVENOUS at 13:51

## 2020-12-17 RX ADMIN — SODIUM CHLORIDE 60 ML: 9 INJECTION, SOLUTION INTRAVENOUS at 14:11

## 2020-12-17 RX ADMIN — IOPAMIDOL 70 ML: 755 INJECTION, SOLUTION INTRAVENOUS at 14:11

## 2020-12-17 RX ADMIN — SODIUM CHLORIDE 1000 ML: 9 INJECTION, SOLUTION INTRAVENOUS at 11:59

## 2020-12-17 RX ADMIN — METHYLPREDNISOLONE SODIUM SUCCINATE 20 MG: 40 INJECTION, POWDER, FOR SOLUTION INTRAMUSCULAR; INTRAVENOUS at 14:04

## 2020-12-17 ASSESSMENT — ENCOUNTER SYMPTOMS
SORE THROAT: 0
COUGH: 0
MYALGIAS: 1
HEADACHES: 1
WEAKNESS: 1
VOMITING: 1
SHORTNESS OF BREATH: 0
NAUSEA: 1
DIARRHEA: 1

## 2020-12-17 ASSESSMENT — MIFFLIN-ST. JEOR: SCORE: 1148.13

## 2020-12-17 NOTE — ED PROVIDER NOTES
"  History     Chief Complaint:  Generalized Weakness and Myalgias    HPI   Batsheva Fuentes is a 75 year old female with history of PMR, hypertension, DVT who presents for evaluation of generalized weakness and myalgias. The patient reports that for the past 2 weeks she has been feeling increasing unwell and sick with generalized weakness, myalgias, and a headache. She notes that she was seen by her primary care on December 2 ([facility]) and she had a work up showing \"high levels\" for PMR which she was started on 5 mg prednisone for. The patient has continued to feel unwell and is now experiencing nausea, vomiting, and diarrhea with a poor appetite. She is trying to drink fluids and has been taking Tylenol, Advil, Aleeve at home without relief.  She denies sore throat, cough, loss taste/smell, chest pain, shortness of breath, or urinary symptoms. The patient reports that she has had several negative COVID-19 tests.     Review of Systems   HENT: Negative for sore throat.    Respiratory: Negative for cough and shortness of breath.    Cardiovascular: Negative for chest pain.   Gastrointestinal: Positive for diarrhea, nausea and vomiting.   Genitourinary:        No urinary symptoms   Musculoskeletal: Positive for myalgias.   Neurological: Positive for weakness and headaches.   All other systems reviewed and are negative.      Allergies:  The patient has no known allergies.     Medications:  Levothyroxine   Losartan-hydrochlorothiazide   Zoloft  Prednisone     Past Medical History:    PMR   Hypertension  DVT     Past Surgical History:    Tonsillectomy  Hysterectomy   Brain aneurysm surgery  Sinus surgery     Family History:    Father: heart disease  Mother: heart disease  Brother: diabetes  Sister: cancer     Social History:  The patient was unaccompanied to the ED.  The patient lives at home with someone.   PCP: Eileen Go     Physical Exam     Patient Vitals for the past 24 hrs:   " "BP Temp Temp src Pulse Resp SpO2 Height Weight   12/17/20 1330 126/66 -- -- -- -- -- -- --   12/17/20 1300 128/62 -- -- 92 -- -- -- --   12/17/20 1245 114/67 -- -- 94 -- -- -- --   12/17/20 1230 138/66 -- -- 101 -- 96 % -- --   12/17/20 1215 129/65 -- -- 92 -- 97 % -- --   12/17/20 1200 114/70 -- -- 98 -- 98 % -- --   12/17/20 1150 -- -- -- -- -- -- 1.702 m (5' 7\") 63 kg (139 lb)   12/17/20 1145 122/62 -- -- 95 -- 96 % -- --   12/17/20 1132 -- 99.3  F (37.4  C) Oral -- 24 98 % -- --   12/17/20 1130 108/71 -- -- 93 -- 97 % -- --     Physical Exam  Vitals signs and nursing note reviewed.   Constitutional:       Comments: Elderly frail-appearing   HENT:      Head: Normocephalic.      Right Ear: Tympanic membrane normal.      Left Ear: Tympanic membrane normal.      Mouth/Throat:      Mouth: Mucous membranes are moist.   Eyes:      Pupils: Pupils are equal, round, and reactive to light.   Cardiovascular:      Rate and Rhythm: Normal rate and regular rhythm.   Pulmonary:      Effort: Pulmonary effort is normal.      Breath sounds: Normal breath sounds.   Abdominal:      General: Abdomen is flat.      Palpations: Abdomen is soft.      Comments: General tenderness no guarding or rebound   Musculoskeletal:      Comments: Patient describes pain in upper arms and legs and myalgias.  Symptoms are suspicious for PMR.  Patient has no signs of redness or swelling.   Skin:     General: Skin is warm.      Capillary Refill: Capillary refill takes less than 2 seconds.   Neurological:      General: No focal deficit present.      Mental Status: She is alert.   Psychiatric:         Mood and Affect: Mood normal.         Emergency Department Course     Imaging:    CT Abdomen Pelvis w Contrast  1.  Diffuse bowel wall thickening in the descending and sigmoid colon  is consistent with colitis, most likely infectious or inflammatory in  etiology.  2.  Trace amount of free fluid in the pelvis is nonspecific, but also  most likely related to " colitis.  3.  Colonic diverticulosis, without convincing evidence for  diverticulitis.  4.  Cholelithiasis.  JAMEE LANDA MD  Reading per radiology    XR Chest Port 1 View  Negative exam.  TIGRE BARRERA MD  Reading per radiology    Laboratory:    UA: Urinebilin small (A), Urineketon 10 (A), Blood moderate (A), Protein Albumin 100 (A), Urobilinogen 4.0 (H), Leukocyte Esterase large (A), RBC 81 (H), Squamous Epithelial 2 (H), Mucous present (A), o/w WNL.     CBC: WBC 11.4 (H), HGB 11.2 (L),  (H)  CMP: Potassium 3.1 (L), Glucose 110 (H), Albumin 2.6 (L), o/w WNL (Creatinine 0.78)    TSH with free T4 reflex: 1.73    CRP inflammation: 172 (H)  Erythryocyte Sedimentation Rate Auto: 83 (H)  CK total: 18 (L)     Emergency Department Course:    Reviewed:    I reviewed the patient's nursing notes, vitals, past medical records, Care Everywhere.     Assessments:    1131 I performed an exam of the patient as documented above.     1330 Patient rechecked and updated.     1500 Patient rechecked and updated. The patient felt improved in the emergency department and felt comfortable with discharge to home. Discharge instructions were discussed prior to discharge.     Interventions:  1159 NS 1000 mL IV  1351  mL IV  1404 solu-medrol 20 mg IV     Disposition:  The patient was discharged to home.     Impression & Plan      Covid-19  Batsheva Fuentes was evaluated during a global COVID-19 pandemic, which necessitated consideration that the patient might be at risk for infection with the SARS-CoV-2 virus that causes COVID-19.   Applicable protocols for evaluation were followed during the patient's care.   COVID-19 was considered as part of the patient's evaluation. The plan for testing is:  Offered and patient declined.     Medical Decision Making:  Batsheva Fuentes is a 75 year old female who presents to the emergency department today for evaluation of general malaise body pain as well as abdominal pain and  diarrhea and vomiting.  Patient is well-appearing and clinically seems well-hydrated heart rate is okay lab work for dehydration is normal.  Inflammatory markers are high the patient has a history of PMR.  Due to her myalgias complaints I am suspicious of PMR exacerbation however due to abdominal pain and age CT was recommended for further evaluation this was accomplished and did identify colitis but is nonspecific lactic acid is not elevated cannot rule out ischemic colitis versus infectious versus inflammatory.  Recommendations are to increase prednisone use we will recommend stool cultures patient had no diarrhea or vomiting in the emergency room and seemed to feel better simply with IV fluids and IV dose of steroid was offered we will continue prednisone and do outpatient stool cultures however patient was offered admission due to age and multiple complaints patient prefers to go home patient has been ruled out for Covid in the past will recommend prednisone increase stool cultures as an outpatient and return with worsening condition patient seems comfortable with this plan and was discharged in stable condition.    Diagnosis:    ICD-10-CM    1. Non-specific colitis  K52.9 Clostridium difficile toxin B     Enteric Bacteria and Virus Panel by [facility] Stool   2. PMR (polymyalgia rheumatica) (H)  M35.3      Discharge Medications:  New Prescriptions    PREDNISONE (DELTASONE) 20 MG TABLET    2 tabs day 1-2, then 1 tab days 3-4, then 1/2 tab daily for 6 days     Scribe Disclosure:  I, Orla Severson, am serving as a scribe at 11:29 AM on 12/17/2020 to document services personally performed by Tejinder Burdick MD based on my observations and the provider's statements to me.     [facility] EMERGENCY DEPT         Tejinder Burdick MD  12/20/20 8108

## 2020-12-17 NOTE — DISCHARGE INSTRUCTIONS
We have discussed your CT scan finding which shows some gallstones but also inflammation of the colon the cause of the inflammation is unclear.  We do recommend seeing to evaluate you for bacterial infection.  Reviewed please return this to the lab when able.  We are increasing your prednisone due to your history of polymyalgia rheumatica and a high sedimentation and CRP rate.  Please follow-up with your family practitioner for reassessment for this.  The emergency room is here to support you if you feel worse or develop high fever or bloody stool return to the emergency room for reassessment.

## 2020-12-17 NOTE — ED AVS SNAPSHOT
[facility] Emergency Dept  [address] [address]  Phone: [phone]  Fax: [phone]                                    Batsheva Fuentes   MRN: [id number]    Department: [facility] Emergency Dept   Date of Visit: 12/17/2020           After Visit Summary Signature Page    I have received my discharge instructions, and my questions have been answered. I have discussed any challenges I see with this plan with the nurse or doctor.    ..........................................................................................................................................  Patient/Patient Representative Signature      ..........................................................................................................................................  Patient Representative Print Name and Relationship to Patient    ..................................................               ................................................  Date                                   Time    ..........................................................................................................................................  Reviewed by Signature/Title    ...................................................              ..............................................  Date                                               Time          22EPIC Rev 08/18

## 2020-12-18 ENCOUNTER — HOSPITAL ENCOUNTER (OUTPATIENT)
Dept: LAB | Facility: CLINIC | Age: 75
Discharge: HOME | End: 2020-12-18
Attending: EMERGENCY MEDICINE | Admitting: EMERGENCY MEDICINE
Payer: COMMERCIAL

## 2020-12-18 DIAGNOSIS — K52.9 NON-SPECIFIC COLITIS: ICD-10-CM

## 2020-12-18 LAB
C COLI+JEJUNI+LARI FUSA STL QL NAA+PROBE: NOT DETECTED
C DIFF TOX B STL QL: NEGATIVE
EC STX1 GENE STL QL NAA+PROBE: NOT DETECTED
EC STX2 GENE STL QL NAA+PROBE: NOT DETECTED
ENTERIC PATHOGEN COMMENT: NORMAL
NOROV GI+II ORF1-ORF2 JNC STL QL NAA+PR: NOT DETECTED
RVA NSP5 STL QL NAA+PROBE: NOT DETECTED
SALMONELLA SP RPOD STL QL NAA+PROBE: NOT DETECTED
SHIGELLA SP+EIEC IPAH STL QL NAA+PROBE: NOT DETECTED
SPECIMEN SOURCE: NORMAL
V CHOL+PARA RFBL+TRKH+TNAA STL QL NAA+PR: NOT DETECTED
Y ENTERO RECN STL QL NAA+PROBE: NOT DETECTED

## 2020-12-18 PROCEDURE — 87506 IADNA-DNA/RNA PROBE TQ 6-11: CPT | Performed by: EMERGENCY MEDICINE

## 2020-12-18 PROCEDURE — 87493 C DIFF AMPLIFIED PROBE: CPT | Mod: XU | Performed by: EMERGENCY MEDICINE

## 2020-12-18 NOTE — TELEPHONE ENCOUNTER
Daughter is calling in for her mom. Pt was in the ER today , was diagnosed with Colitis. Pt arrived back home at noon today and ate a ham sandwich, and started having vomiting and diarrhea again. Pt vomited 2 times, and had 3 bouts of Diarrhea. Daughter denies blood in stool, abdominal pain, or fever.   Care advice given, and per protocol pt should be evaluated within 3 days in the clinic. Daughter was transferred to scheduling, but would prefer to call her regular clinic tomorrow. Daughter was advised to call back if pt has blood in her stools, if she develops constant or severe abdominal pain, or if symptoms worsen. Daughter verbalized understanding.     Rajinder Blanco RN on 12/17/2020 at 8:09 PM      Additional Information    Negative: Shock suspected (e.g., cold/pale/clammy skin, too weak to stand, low BP, rapid pulse)    Negative: Difficult to awaken or acting confused (e.g., disoriented, slurred speech)    Negative: Sounds like a life-threatening emergency to the triager    Negative: Vomiting also present and worse than the diarrhea    Negative: [1] Blood in stool AND [2] without diarrhea    Negative: Diarrhea in a cancer patient who is currently (or recently) receiving chemotherapy or radiation therapy, or cancer patient who has metastatic or end-stage cancer and is receiving palliative care    Negative: [1] SEVERE abdominal pain (e.g., excruciating) AND [2] present > 1 hour    Negative: [1] SEVERE abdominal pain AND [2] age > 60    Negative: [1] Blood in the stool AND [2] moderate or large amount of blood    Negative: Black or tarry bowel movements  (Exception: chronic-unchanged  black-grey bowel movements AND is taking iron pills or Pepto-bismol)    Negative: [1] Drinking very little AND [2] dehydration suspected (e.g., no urine > 12 hours, very dry mouth, very lightheaded)    Negative: Patient sounds very sick or weak to the triager    Negative: [1] SEVERE diarrhea (e.g., 7 or more times / day more than  normal) AND [2] age > 60 years    Negative: [1] Constant abdominal pain AND [2] present > 2 hours    Negative: [1] Fever > 103 F (39.4 C) AND [2] not able to get the fever down using Fever Care Advice    Negative: [1] SEVERE diarrhea (e.g., 7 or more times / day more than normal) AND [2] present > 24 hours (1 day)    Negative: [1] MODERATE diarrhea (e.g., 4-6 times / day more than normal) AND [2] present > 48 hours (2 days)    Negative: [1] MODERATE diarrhea (e.g., 4-6 times / day more than normal) AND [2] age > 70 years    Negative: Fever > 101 F (38.3 C)    Negative: Fever present > 3 days (72 hours)    Negative: Abdominal pain  (Exception: Pain clears with each passage of diarrhea stool)    Negative: [1] Blood in the stool AND [2] small amount of blood   (Exception: only on toilet paper. Reason: diarrhea can cause rectal irritation with blood on wiping)    Negative: [1] Mucus or pus in stool AND [2] present > 2 days AND [3] diarrhea is more than mild    Negative: [1] Recent antibiotic therapy (i.e., within last 2 months) AND [2] diarrhea present > 3 days since antibiotic was stopped    Negative: [1] Recent hospitalization AND [2] diarrhea present > 3 days    Negative: Weak immune system (e.g., HIV positive, cancer chemo, splenectomy, organ transplant, chronic steroids)    Negative: Tube feedings (e.g., nasogastric, g-tube, j-tube)    Negative: Travel to a foreign country in past month    [1] MILD diarrhea (e.g., 1-3 or more stools than normal in past 24 hours) without known cause AND [2] present >  7 days    Protocols used: DIARRHEA-A-AH

## 2020-12-26 ENCOUNTER — TELEPHONE (OUTPATIENT)
Dept: EMERGENCY MEDICINE | Facility: CLINIC | Age: 75
End: 2020-12-26

## 2020-12-26 NOTE — TELEPHONE ENCOUNTER
[facility] Emergency Department Lab result notification     Patient/parent Name  Batsheva    Reason for call  Patient requesting lab result    Lab Result  Clostridium difficile toxin B is negative  Final Enteric Bacteria and Virus Panel by [facility] Stool is NEGATIVE for Campylobacter group, Salmonella species, Shigella species, Shiga toxin 1 gene, Shiga toxin 2 gene, Vibrio group,  Yersinia enterocolitica,  Rotavirus A,  and Norovirus I and II.    No treatment or change in treatment per [place] ED Lab Result protocol.    Current symptoms  10AM: Left message for the patient to call back.  12:49PM: Spoke with patient, she states she is feeling much better. Has an appointment with a GI dr on 1/4.   Recommendations/Instructions  Results given to patient.  Advised to contact her PCP in 2 days (12/28) to discuss her ED visit and determine her follow up.  The patient is comfortable with the information given and has no further questions.     Contact your PCP clinic or return to the Emergency department if your:    Symptoms worsen or other concerning symptom's.    PCP follow-up Questions asked: YES       [RN Name]  Karuna Baron RN  [facility] RN  Lung Nodule and ED Lab Result RN  Epic pool (ED late result f/u RN): [id number]  [facility] INCIDENTAL RADIOLOGY F/U NURSES: P [id number]  # [phone]      Copy of Lab result   Clostridium difficile toxin B  Order: [id number]  Status:  Final result   Visible to patient:  No (inaccessible in MyChart) Dx:  Non-specific colitis  Specimen Information: Feces          Ref Range & Units 8d ago   (12/18/20) 15yr ago   (2/17/05) 15yr ago   (2/17/05)    Specimen Description  Feces  Cerebrospinal fluid  Cerebrospinal fluid     C Diff Toxin B PCR NEG^Negative Negative      Comment: Negative: C. difficile target DNA sequences NOT detected, presumed negative   for C.difficile toxin B or the number of bacteria present may be below the   limit of detection for the test.   FDA  approved assay performed using [employer] GeneXpert real-time PCR.   A negative result does not exclude actual disease due to C. difficile and may   be due to improper collection, handling and storage of the specimen or the   number of organisms in the specimen is below the detection limit of the assay.    [facility]   [facility] MISYS MISYS         Specimen Collected: 12/18/20  8:40 AM Last Resulted: 12/18/20  1:54 PM           Enteric Bacteria and Virus Panel by [facility] Stool  Order: [id number]  Status:  Final result   Visible to patient:  No (inaccessible in MyChart) Dx:  Non-specific colitis  Specimen Information: Feces          Ref Range & Units 8d ago    Campylobacter group by [facility]CE NDET^Not Detected Not Detected     Salmonella species by VINCE NDET^Not Detected Not Detected     Shigella species by VINCE NDET^Not Detected Not Detected     Vibrio group by VINCE NDET^Not Detected Not Detected     Rotavirus A by VINCE NDET^Not Detected Not Detected     Shiga toxin 1 gene by VINCE NDET^Not Detected Not Detected     Shiga toxin 2 gene by VINCE NDET^Not Detected Not Detected     Norovirus I and II by VINCE NDET^Not Detected Not Detected     Yersinia enterocolitica by VINCE NDET^Not Detected Not Detected     Enteric pathogen comment  Testing performed by multiplexed, qualitative PCR using the [employer] Enteric   Pathogens Nucleic Acid Test. Results should not be used as the sole basis for diagnosis,   treatment, or other patient management decisions.    Comment: Positive results do not rule out co-infection with other organisms that are   not detected by this test, and may not be the sole or definitive cause of   patient illness.   Negative results in the setting of clinical illness compatible with   gastroenteritis may be due to infection by pathogens that are not detected by   this test or non-infectious causes such as ulcerative colitis, irritable  bowel    syndrome, or Crohn's disease.   Note: Shiga toxin producing E. coli (STEC) typically harbor one or both genes   that encode for Shiga toxins 1 and 2.    Resulting Agency  [facility]         Specimen Collected: 12/18/20  8:40 AM Last Resulted: 12/18/20  4:20 PM

## 2020-12-28 ENCOUNTER — HOSPITAL ENCOUNTER (EMERGENCY)
Facility: CLINIC | Age: 75
Discharge: HOME | End: 2020-12-28
Attending: NURSE PRACTITIONER | Admitting: NURSE PRACTITIONER
Payer: COMMERCIAL

## 2020-12-28 ENCOUNTER — APPOINTMENT (OUTPATIENT)
Dept: CT IMAGING | Facility: CLINIC | Age: 75
End: 2020-12-28
Attending: NURSE PRACTITIONER
Payer: COMMERCIAL

## 2020-12-28 VITALS
HEART RATE: 77 BPM | BODY MASS INDEX: 21.14 KG/M2 | DIASTOLIC BLOOD PRESSURE: 73 MMHG | RESPIRATION RATE: 15 BRPM | TEMPERATURE: 98.2 F | SYSTOLIC BLOOD PRESSURE: 131 MMHG | OXYGEN SATURATION: 97 % | WEIGHT: 135 LBS

## 2020-12-28 DIAGNOSIS — R11.10 VOMITING AND DIARRHEA: ICD-10-CM

## 2020-12-28 DIAGNOSIS — K52.9 COLITIS: ICD-10-CM

## 2020-12-28 DIAGNOSIS — R19.7 VOMITING AND DIARRHEA: ICD-10-CM

## 2020-12-28 PROBLEM — I82.401 RIGHT LEG DVT (H): Status: ACTIVE | Noted: 2017-10-16

## 2020-12-28 LAB
ALBUMIN SERPL-MCNC: 2.9 G/DL (ref 3.4–5)
ALP SERPL-CCNC: 69 U/L (ref 40–150)
ALT SERPL W P-5'-P-CCNC: 12 U/L (ref 0–50)
ANION GAP SERPL CALCULATED.3IONS-SCNC: 9 MMOL/L (ref 3–14)
AST SERPL W P-5'-P-CCNC: 6 U/L (ref 0–45)
BASOPHILS # BLD AUTO: 0.1 10E9/L (ref 0–0.2)
BASOPHILS NFR BLD AUTO: 0.3 %
BILIRUB SERPL-MCNC: 0.5 MG/DL (ref 0.2–1.3)
BUN SERPL-MCNC: 14 MG/DL (ref 7–30)
CALCIUM SERPL-MCNC: 9.5 MG/DL (ref 8.5–10.1)
CHLORIDE SERPL-SCNC: 98 MMOL/L (ref 94–109)
CO2 SERPL-SCNC: 28 MMOL/L (ref 20–32)
CREAT SERPL-MCNC: 0.82 MG/DL (ref 0.52–1.04)
DIFFERENTIAL METHOD BLD: ABNORMAL
EOSINOPHIL # BLD AUTO: 0 10E9/L (ref 0–0.7)
EOSINOPHIL NFR BLD AUTO: 0.1 %
ERYTHROCYTE [DISTWIDTH] IN BLOOD BY AUTOMATED COUNT: 14.9 % (ref 10–15)
GFR SERPL CREATININE-BSD FRML MDRD: 70 ML/MIN/{1.73_M2}
GLUCOSE SERPL-MCNC: 102 MG/DL (ref 70–99)
HCT VFR BLD AUTO: 36.3 % (ref 35–47)
HGB BLD-MCNC: 11.3 G/DL (ref 11.7–15.7)
IMM GRANULOCYTES # BLD: 0.2 10E9/L (ref 0–0.4)
IMM GRANULOCYTES NFR BLD: 1 %
LYMPHOCYTES # BLD AUTO: 1.1 10E9/L (ref 0.8–5.3)
LYMPHOCYTES NFR BLD AUTO: 6 %
MCH RBC QN AUTO: 25.7 PG (ref 26.5–33)
MCHC RBC AUTO-ENTMCNC: 31.1 G/DL (ref 31.5–36.5)
MCV RBC AUTO: 83 FL (ref 78–100)
MONOCYTES # BLD AUTO: 1.6 10E9/L (ref 0–1.3)
MONOCYTES NFR BLD AUTO: 8.3 %
NEUTROPHILS # BLD AUTO: 16 10E9/L (ref 1.6–8.3)
NEUTROPHILS NFR BLD AUTO: 84.3 %
NRBC # BLD AUTO: 0 10*3/UL
NRBC BLD AUTO-RTO: 0 /100
PLATELET # BLD AUTO: 580 10E9/L (ref 150–450)
POTASSIUM SERPL-SCNC: 3.2 MMOL/L (ref 3.4–5.3)
PROT SERPL-MCNC: 7.2 G/DL (ref 6.8–8.8)
RBC # BLD AUTO: 4.4 10E12/L (ref 3.8–5.2)
SODIUM SERPL-SCNC: 135 MMOL/L (ref 133–144)
WBC # BLD AUTO: 19 10E9/L (ref 4–11)

## 2020-12-28 PROCEDURE — 250N000009 HC RX 250: Performed by: NURSE PRACTITIONER

## 2020-12-28 PROCEDURE — 96361 HYDRATE IV INFUSION ADD-ON: CPT

## 2020-12-28 PROCEDURE — 85025 COMPLETE CBC W/AUTO DIFF WBC: CPT | Performed by: NURSE PRACTITIONER

## 2020-12-28 PROCEDURE — 96374 THER/PROPH/DIAG INJ IV PUSH: CPT | Mod: 59

## 2020-12-28 PROCEDURE — 250N000011 HC RX IP 250 OP 636: Performed by: NURSE PRACTITIONER

## 2020-12-28 PROCEDURE — 258N000003 HC RX IP 258 OP 636: Performed by: NURSE PRACTITIONER

## 2020-12-28 PROCEDURE — 74177 CT ABD & PELVIS W/CONTRAST: CPT

## 2020-12-28 PROCEDURE — 99285 EMERGENCY DEPT VISIT HI MDM: CPT | Mod: 25

## 2020-12-28 PROCEDURE — 80053 COMPREHEN METABOLIC PANEL: CPT | Performed by: NURSE PRACTITIONER

## 2020-12-28 RX ORDER — ONDANSETRON 4 MG/1
4 TABLET, ORALLY DISINTEGRATING ORAL EVERY 8 HOURS PRN
Qty: 10 TABLET | Refills: 0 | Status: SHIPPED | OUTPATIENT
Start: 2020-12-28 | End: 2020-12-31

## 2020-12-28 RX ORDER — ONDANSETRON 2 MG/ML
4 INJECTION INTRAMUSCULAR; INTRAVENOUS ONCE
Status: COMPLETED | OUTPATIENT
Start: 2020-12-28 | End: 2020-12-28

## 2020-12-28 RX ORDER — IOPAMIDOL 755 MG/ML
68 INJECTION, SOLUTION INTRAVASCULAR ONCE
Status: COMPLETED | OUTPATIENT
Start: 2020-12-28 | End: 2020-12-28

## 2020-12-28 RX ADMIN — SODIUM CHLORIDE 60 ML: 9 INJECTION, SOLUTION INTRAVENOUS at 16:21

## 2020-12-28 RX ADMIN — SODIUM CHLORIDE 500 ML: 9 INJECTION, SOLUTION INTRAVENOUS at 15:57

## 2020-12-28 RX ADMIN — IOPAMIDOL 68 ML: 755 INJECTION, SOLUTION INTRAVENOUS at 16:21

## 2020-12-28 RX ADMIN — ONDANSETRON 4 MG: 2 INJECTION INTRAMUSCULAR; INTRAVENOUS at 15:58

## 2020-12-28 SDOH — HEALTH STABILITY: MENTAL HEALTH: HOW OFTEN DO YOU HAVE A DRINK CONTAINING ALCOHOL?: NEVER

## 2020-12-28 ASSESSMENT — ENCOUNTER SYMPTOMS
DYSURIA: 0
HEADACHES: 0
VOMITING: 1
ABDOMINAL PAIN: 1
FREQUENCY: 0
APPETITE CHANGE: 1
DIARRHEA: 1

## 2020-12-28 NOTE — ED TRIAGE NOTES
Hx colitis recently and not getting better. + vomiting and diarrhea. Brought in by grandsons, who report that the last time this happened, pt had a brain aneurysm and they are now concerned that may be the case again.

## 2020-12-28 NOTE — ED NOTES
Bed: ED26  Expected date: 12/28/20  Expected time: 2:46 PM  Means of arrival:   Comments:  Triage

## 2020-12-28 NOTE — ED PROVIDER NOTES
History   Chief Complaint:  Diarrhea and vomiting     The history is provided by the patient.      Batsheva Fuentes is a 75 year old female with history of colitis and hypertension who presents with diarrhea and vomiting. She states that three weeks ago she developed diarrhea, intermittent vomiting, and bilateral lower abdominal pain. This pain has been progressing since that time. This morning she did have a piece of toast and half a banana and after taking her prednisone she had both emesis and diarrhea. Beyond this, she reports she has no appetite. She did have an episode of blood in the stool and was seen in the ER on 12/17. Please see this note for specifics. She denies headache, vision changes, dysuria, frequency of urination, or other concerns. She has not been on any antibiotics recently. She denies recent alcohol or tobacco use. No recent changes in medications.     12/17/2020 CT Abdomen Pelvis w Contrast  1.  Diffuse bowel wall thickening in the descending and sigmoid colon  is consistent with colitis, most likely infectious or inflammatory in  etiology.  2.  Trace amount of free fluid in the pelvis is nonspecific, but also  most likely related to colitis.  3.  Colonic diverticulosis, without convincing evidence for  diverticulitis.  4.  Cholelithiasis.    Review of Systems   Constitutional: Positive for appetite change.   Eyes: Negative for visual disturbance.   Gastrointestinal: Positive for abdominal pain, diarrhea and vomiting.   Genitourinary: Negative for dysuria and frequency.   Neurological: Negative for headaches.   All other systems reviewed and are negative.    Allergies:  No Known Allergies    Medications:  Synthroid  Hyzaar  Zoloft     Past Medical History:    Colitis   DVT  Hypertension      Past Surgical History:    Tonsillectomy   Hysterectomy  Brain aneurysm surgery   Sinus surgery     Family History:    Heart disease   Diabetes mellitus     Social History:  Patient presents to the ER  unaccompanied.   Patient is .   She denies alcohol or drug use.     Physical Exam     Patient Vitals for the past 24 hrs:   BP Temp Temp src Pulse Resp SpO2 Weight   12/28/20 1453 109/62 98.2  F (36.8  C) Oral 90 16 97 % 61.2 kg (135 lb)       Physical Exam  General: Alert, Mild  discomfort, well kept  Eyes: PERRL, conjunctivae pink no scleral icterus or conjunctival injection  ENT:   Moist mucus membranes, posterior oropharynx clear without erythema or exudates, No lymphadenopathy, Normal voice  Resp:  Lungs clear to auscultation bilaterally, no crackles/rubs/wheezes. Good air movement  CV:  Normal rate and rhythm, no murmurs/rubs/gallops  GI:  Abdomen soft and non-distended.  Normoactive BS.  Mild lower abdominal tenderness. No guarding or rebound, No masses  Skin:  Warm, dry.  No rashes or petechiae  Musculoskeletal: No peripheral edema or calf tenderness, Normal gross ROM   Neuro: Alert and oriented to person/place/time, normal sensation  Psychiatric: Normal affect, cooperative, good eye contact    Emergency Department Course     Imaging:  CT Abdomen Pelvis w Contrast  IMPRESSION: Diffuse thickening of the sigmoid colon grossly similar to  the prior study likely infectious or inflammatory colitis. There is  trace amount of associated pelvic free fluid.  Reading per radiology     Laboratory:  CMP: potassium 3.2(L), glucose 102(H), albumin 2.9(L)o/w WNL (Creatinine 0.82)  CBC: WBC 19.0(H), HGB 11.3(L), (H)    Emergency Department Course:    Reviewed:  I reviewed nursing notes, vitals, past medical history and care everywhere    Assessments:  1530 I examined and obtained history from the patient as noted above.   1655 I returned to update and recheck the patient.     Interventions:  1557  ml IV  1558 Zofran 4 mg IV    Disposition:  The patient was discharged to home.       Impression & Plan     Medical Decision Making:  Batsheva Fuentes is a 75 year old female who presents with acute nausea,  vomiting and diarrhea. She has a benign abdominal exam without focal RLQ or LLQ tenderness to suggest diverticulitis or appendicitis, respectively, or other acute abdominal process. I did discuss the sometimes vague initial constellation of symptoms early in the course of acute abdominal processes, and the need for immediate return should pain or other concerning symptoms develop. CT scan showed evidence of colitis that is not significantly changed from prior.  Patient did have a slightly elevated white cell count this is most likely related to her prednisone.  Her CT was otherwise unchanged we discussed risk and benefits of antibiotics.  As it has been 3+ week she would like to try antibiotics.  She does understand that this could make symptoms worse.  We also discussed use of over-the-counter probiotics.  She will continue to follow-up as planned with gastroenterology.  Symptoms are improved after IV fluids and symptomatic treatment.  The patient is not pregnant. There is no evidence or concern for urinary tract infection. There has been no travel or antibiotic exposure to suggest more concerning cause of diarrhea, and there has been no blood in vomit or stool.  Vital signs have remained normal and stable throughout the ED course, and the abdominal re-exam remains benign. Tolerating PO intake and will prescribe Zofran for nausea.  I believe she is safe for discharge in improved condition at this time with strict return precautions for recurrent vomiting, pain, fever or any other concerning symptoms.     Diagnosis:    ICD-10-CM    1. Vomiting and diarrhea  R11.10     R19.7    2. Colitis  K52.9        Discharge Medications:  New Prescriptions    AMOXICILLIN-CLAVULANATE (AUGMENTIN) 875-125 MG TABLET    Take 1 tablet by mouth 2 times daily for 10 days    ONDANSETRON (ZOFRAN ODT) 4 MG ODT TAB    Take 1 tablet (4 mg) by mouth every 8 hours as needed for nausea       Scribe Disclosure:  Ada JOE, am serving as a  scribe at 3:19 PM on 12/28/2020 to document services personally performed by Gilbert Loomis APRN based on my observations and the provider's statements to me.        Gilbert Loomis APRN CNP  12/28/20 8624

## 2020-12-28 NOTE — ED AVS SNAPSHOT
[facility]  [address] [address]  Phone: [phone]  Fax: [phone]                                    Batsheva Fuentes   MRN: [id number]    Department: [facility]   Date of Visit: 12/28/2020           After Visit Summary Signature Page    I have received my discharge instructions, and my questions have been answered. I have discussed any challenges I see with this plan with the nurse or doctor.    ..........................................................................................................................................  Patient/Patient Representative Signature      ..........................................................................................................................................  Patient Representative Print Name and Relationship to Patient    ..................................................               ................................................  Date                                   Time    ..........................................................................................................................................  Reviewed by Signature/Title    ...................................................              ..............................................  Date                                               Time          22EPIC Rev 08/18

## 2021-01-15 ENCOUNTER — HEALTH MAINTENANCE LETTER (OUTPATIENT)
Age: 76
End: 2021-01-15

## 2021-01-28 NOTE — PROGRESS NOTES
Clinical Pharmacy Consult:                                                    Batsheva Fuentes is a 75 year old female coming in for a clinical pharmacist consult.  She was referred to me from Dr. Gutierrez, here with Daughter. Seen for consult as patient's insurance does not cover MTM services and would be private pay for comprehensive MTM services.    Reason for Consult: med list update - needs cleaned up med list.     Discussion: Having labs done today to evaluate status - she has had ongoing diarrhea issues and follows with [facility]. She is off her prednisone right now because she took a 40mg burst then stopped, however she was already on 5mg daily for PMR and did not go back to this, so has been off the steroids about a week she thinks. BP low today (96/62) and is down 6 lbs from last month. Medication questions around the indication of the prescribed d    Plan:  1. Resume Prednisone 5mg daily-  Follow up with GI and Rheumatology for longer term plans  2. Stopping Potassium and Losartan/ hydrochlorothiazide , but new Losartan 100mg once daily.   3. Resume Calcium citrate 2 per day    Changes made per authorization from Dr. Gutierrez.     Natasha Little, Pharm.D, [facility]  Medication Therapy Management Pharmacist  [phone]

## 2021-01-29 ENCOUNTER — OFFICE VISIT (OUTPATIENT)
Dept: PHARMACY | Facility: PHYSICIAN GROUP | Age: 76
End: 2021-01-29

## 2021-01-29 DIAGNOSIS — M35.3 PMR (POLYMYALGIA RHEUMATICA) (H): Primary | ICD-10-CM

## 2021-01-29 PROCEDURE — 99207 PR NO CHARGE LOS: CPT | Performed by: PHARMACIST

## 2021-01-29 RX ORDER — DULOXETIN HYDROCHLORIDE 30 MG/1
30 CAPSULE, DELAYED RELEASE ORAL DAILY
Status: ON HOLD | COMMUNITY
End: 2021-03-21

## 2021-01-29 RX ORDER — LOSARTAN POTASSIUM 100 MG/1
100 TABLET ORAL DAILY
COMMUNITY
End: 2021-03-21

## 2021-01-29 RX ORDER — PREDNISONE 5 MG/1
5 TABLET ORAL DAILY
COMMUNITY
End: 2021-03-24

## 2021-01-29 RX ORDER — POTASSIUM CHLORIDE 1500 MG/1
20 TABLET, EXTENDED RELEASE ORAL DAILY
COMMUNITY
End: 2021-01-29

## 2021-03-21 ENCOUNTER — HOSPITAL ENCOUNTER (INPATIENT)
Facility: CLINIC | Age: 76
LOS: 1 days | Discharge: HOME | DRG: 392 | End: 2021-03-22
Attending: EMERGENCY MEDICINE | Admitting: HOSPITALIST
Payer: COMMERCIAL

## 2021-03-21 ENCOUNTER — APPOINTMENT (OUTPATIENT)
Dept: GENERAL RADIOLOGY | Facility: CLINIC | Age: 76
DRG: 392 | End: 2021-03-21
Attending: EMERGENCY MEDICINE
Payer: COMMERCIAL

## 2021-03-21 DIAGNOSIS — N39.0 URINARY TRACT INFECTION WITH HEMATURIA, SITE UNSPECIFIED: ICD-10-CM

## 2021-03-21 DIAGNOSIS — R31.9 URINARY TRACT INFECTION WITH HEMATURIA, SITE UNSPECIFIED: ICD-10-CM

## 2021-03-21 DIAGNOSIS — G93.40 ENCEPHALOPATHY: ICD-10-CM

## 2021-03-21 DIAGNOSIS — R50.9 FEVER, UNSPECIFIED FEVER CAUSE: ICD-10-CM

## 2021-03-21 DIAGNOSIS — K57.32 DIVERTICULITIS OF COLON: Primary | ICD-10-CM

## 2021-03-21 LAB
ALBUMIN SERPL-MCNC: 2.9 G/DL (ref 3.4–5)
ALBUMIN UR-MCNC: 70 MG/DL
ALP SERPL-CCNC: 58 U/L (ref 40–150)
ALT SERPL W P-5'-P-CCNC: 14 U/L (ref 0–50)
ANION GAP SERPL CALCULATED.3IONS-SCNC: 7 MMOL/L (ref 3–14)
APPEARANCE UR: ABNORMAL
AST SERPL W P-5'-P-CCNC: 10 U/L (ref 0–45)
BACTERIA #/AREA URNS HPF: ABNORMAL /HPF
BASOPHILS # BLD AUTO: 0 10E9/L (ref 0–0.2)
BASOPHILS NFR BLD AUTO: 0.2 %
BILIRUB SERPL-MCNC: 0.4 MG/DL (ref 0.2–1.3)
BILIRUB UR QL STRIP: NEGATIVE
BUN SERPL-MCNC: 13 MG/DL (ref 7–30)
CALCIUM SERPL-MCNC: 8.5 MG/DL (ref 8.5–10.1)
CHLORIDE SERPL-SCNC: 100 MMOL/L (ref 94–109)
CO2 SERPL-SCNC: 26 MMOL/L (ref 20–32)
COLOR UR AUTO: ABNORMAL
CREAT SERPL-MCNC: 0.83 MG/DL (ref 0.52–1.04)
DIFFERENTIAL METHOD BLD: ABNORMAL
EOSINOPHIL # BLD AUTO: 0 10E9/L (ref 0–0.7)
EOSINOPHIL NFR BLD AUTO: 0.1 %
ERYTHROCYTE [DISTWIDTH] IN BLOOD BY AUTOMATED COUNT: 15.9 % (ref 10–15)
FLUAV RNA RESP QL NAA+PROBE: NEGATIVE
FLUBV RNA RESP QL NAA+PROBE: NEGATIVE
GFR SERPL CREATININE-BSD FRML MDRD: 68 ML/MIN/{1.73_M2}
GLUCOSE SERPL-MCNC: 98 MG/DL (ref 70–99)
GLUCOSE UR STRIP-MCNC: NEGATIVE MG/DL
HCT VFR BLD AUTO: 32.2 % (ref 35–47)
HGB BLD-MCNC: 9.9 G/DL (ref 11.7–15.7)
HGB UR QL STRIP: ABNORMAL
IMM GRANULOCYTES # BLD: 0 10E9/L (ref 0–0.4)
IMM GRANULOCYTES NFR BLD: 0.4 %
KETONES UR STRIP-MCNC: NEGATIVE MG/DL
LABORATORY COMMENT REPORT: NORMAL
LACTATE BLD-SCNC: 1.2 MMOL/L (ref 0.7–2)
LEUKOCYTE ESTERASE UR QL STRIP: ABNORMAL
LYMPHOCYTES # BLD AUTO: 0.8 10E9/L (ref 0.8–5.3)
LYMPHOCYTES NFR BLD AUTO: 7.6 %
MCH RBC QN AUTO: 26.2 PG (ref 26.5–33)
MCHC RBC AUTO-ENTMCNC: 30.7 G/DL (ref 31.5–36.5)
MCV RBC AUTO: 85 FL (ref 78–100)
MONOCYTES # BLD AUTO: 1.2 10E9/L (ref 0–1.3)
MONOCYTES NFR BLD AUTO: 11.7 %
MUCOUS THREADS #/AREA URNS LPF: PRESENT /LPF
NEUTROPHILS # BLD AUTO: 8.4 10E9/L (ref 1.6–8.3)
NEUTROPHILS NFR BLD AUTO: 80 %
NITRATE UR QL: POSITIVE
NRBC # BLD AUTO: 0 10*3/UL
NRBC BLD AUTO-RTO: 0 /100
PH UR STRIP: 6 PH (ref 5–7)
PLATELET # BLD AUTO: 372 10E9/L (ref 150–450)
POTASSIUM SERPL-SCNC: 3.5 MMOL/L (ref 3.4–5.3)
POTASSIUM SERPL-SCNC: 3.6 MMOL/L (ref 3.4–5.3)
PROT SERPL-MCNC: 7.2 G/DL (ref 6.8–8.8)
RBC # BLD AUTO: 3.78 10E12/L (ref 3.8–5.2)
RBC #/AREA URNS AUTO: 17 /HPF (ref 0–2)
RSV RNA SPEC QL NAA+PROBE: NORMAL
SARS-COV-2 RNA RESP QL NAA+PROBE: NEGATIVE
SODIUM SERPL-SCNC: 133 MMOL/L (ref 133–144)
SOURCE: ABNORMAL
SP GR UR STRIP: 1.01 (ref 1–1.03)
SPECIMEN SOURCE: NORMAL
SQUAMOUS #/AREA URNS AUTO: 0 /HPF (ref 0–1)
TROPONIN I SERPL-MCNC: <0.015 UG/L (ref 0–0.04)
UROBILINOGEN UR STRIP-MCNC: 0 MG/DL (ref 0–2)
WBC # BLD AUTO: 10.5 10E9/L (ref 4–11)
WBC #/AREA URNS AUTO: >182 /HPF (ref 0–5)
WBC CLUMPS #/AREA URNS HPF: PRESENT /HPF

## 2021-03-21 PROCEDURE — 250N000013 HC RX MED GY IP 250 OP 250 PS 637: Performed by: EMERGENCY MEDICINE

## 2021-03-21 PROCEDURE — 87040 BLOOD CULTURE FOR BACTERIA: CPT | Performed by: EMERGENCY MEDICINE

## 2021-03-21 PROCEDURE — C9803 HOPD COVID-19 SPEC COLLECT: HCPCS

## 2021-03-21 PROCEDURE — 99223 1ST HOSP IP/OBS HIGH 75: CPT | Mod: AI | Performed by: HOSPITALIST

## 2021-03-21 PROCEDURE — 36415 COLL VENOUS BLD VENIPUNCTURE: CPT | Performed by: HOSPITALIST

## 2021-03-21 PROCEDURE — 258N000003 HC RX IP 258 OP 636: Performed by: HOSPITALIST

## 2021-03-21 PROCEDURE — 99285 EMERGENCY DEPT VISIT HI MDM: CPT | Mod: 25

## 2021-03-21 PROCEDURE — 96375 TX/PRO/DX INJ NEW DRUG ADDON: CPT

## 2021-03-21 PROCEDURE — 36415 COLL VENOUS BLD VENIPUNCTURE: CPT

## 2021-03-21 PROCEDURE — 80053 COMPREHEN METABOLIC PANEL: CPT | Performed by: EMERGENCY MEDICINE

## 2021-03-21 PROCEDURE — 250N000011 HC RX IP 250 OP 636: Performed by: EMERGENCY MEDICINE

## 2021-03-21 PROCEDURE — 87088 URINE BACTERIA CULTURE: CPT | Performed by: EMERGENCY MEDICINE

## 2021-03-21 PROCEDURE — 71045 X-RAY EXAM CHEST 1 VIEW: CPT

## 2021-03-21 PROCEDURE — 84484 ASSAY OF TROPONIN QUANT: CPT | Performed by: EMERGENCY MEDICINE

## 2021-03-21 PROCEDURE — 84132 ASSAY OF SERUM POTASSIUM: CPT | Performed by: HOSPITALIST

## 2021-03-21 PROCEDURE — 96366 THER/PROPH/DIAG IV INF ADDON: CPT

## 2021-03-21 PROCEDURE — 87186 SC STD MICRODIL/AGAR DIL: CPT | Performed by: EMERGENCY MEDICINE

## 2021-03-21 PROCEDURE — 120N000004 HC R&B MS OVERFLOW

## 2021-03-21 PROCEDURE — 83605 ASSAY OF LACTIC ACID: CPT | Performed by: EMERGENCY MEDICINE

## 2021-03-21 PROCEDURE — 81001 URINALYSIS AUTO W/SCOPE: CPT | Performed by: EMERGENCY MEDICINE

## 2021-03-21 PROCEDURE — 87086 URINE CULTURE/COLONY COUNT: CPT | Performed by: EMERGENCY MEDICINE

## 2021-03-21 PROCEDURE — 96365 THER/PROPH/DIAG IV INF INIT: CPT

## 2021-03-21 PROCEDURE — 96361 HYDRATE IV INFUSION ADD-ON: CPT

## 2021-03-21 PROCEDURE — G0378 HOSPITAL OBSERVATION PER HR: HCPCS

## 2021-03-21 PROCEDURE — 85025 COMPLETE CBC W/AUTO DIFF WBC: CPT | Performed by: EMERGENCY MEDICINE

## 2021-03-21 PROCEDURE — 250N000012 HC RX MED GY IP 250 OP 636 PS 637: Performed by: HOSPITALIST

## 2021-03-21 PROCEDURE — 87636 SARSCOV2 & INF A&B AMP PRB: CPT | Performed by: EMERGENCY MEDICINE

## 2021-03-21 PROCEDURE — 250N000013 HC RX MED GY IP 250 OP 250 PS 637: Performed by: HOSPITALIST

## 2021-03-21 PROCEDURE — 258N000003 HC RX IP 258 OP 636: Performed by: EMERGENCY MEDICINE

## 2021-03-21 RX ORDER — ONDANSETRON 2 MG/ML
4 INJECTION INTRAMUSCULAR; INTRAVENOUS ONCE
Status: COMPLETED | OUTPATIENT
Start: 2021-03-21 | End: 2021-03-21

## 2021-03-21 RX ORDER — PROCHLORPERAZINE 25 MG
12.5 SUPPOSITORY, RECTAL RECTAL EVERY 12 HOURS PRN
Status: DISCONTINUED | OUTPATIENT
Start: 2021-03-21 | End: 2021-03-22 | Stop reason: HOSPADM

## 2021-03-21 RX ORDER — LOSARTAN POTASSIUM 100 MG/1
100 TABLET ORAL DAILY
Status: DISCONTINUED | OUTPATIENT
Start: 2021-03-21 | End: 2021-03-21

## 2021-03-21 RX ORDER — DULOXETIN HYDROCHLORIDE 30 MG/1
30 CAPSULE, DELAYED RELEASE ORAL DAILY
Status: DISCONTINUED | OUTPATIENT
Start: 2021-03-21 | End: 2021-03-21

## 2021-03-21 RX ORDER — ACETAMINOPHEN 325 MG/1
650 TABLET ORAL EVERY 4 HOURS PRN
Status: DISCONTINUED | OUTPATIENT
Start: 2021-03-21 | End: 2021-03-22 | Stop reason: HOSPADM

## 2021-03-21 RX ORDER — ACETAMINOPHEN 325 MG/1
650 TABLET ORAL ONCE
Status: COMPLETED | OUTPATIENT
Start: 2021-03-21 | End: 2021-03-21

## 2021-03-21 RX ORDER — LIDOCAINE 40 MG/G
CREAM TOPICAL
Status: DISCONTINUED | OUTPATIENT
Start: 2021-03-21 | End: 2021-03-22 | Stop reason: HOSPADM

## 2021-03-21 RX ORDER — ACETAMINOPHEN 650 MG/1
650 SUPPOSITORY RECTAL EVERY 4 HOURS PRN
Status: DISCONTINUED | OUTPATIENT
Start: 2021-03-21 | End: 2021-03-22 | Stop reason: HOSPADM

## 2021-03-21 RX ORDER — LOSARTAN POTASSIUM 100 MG/1
100 TABLET ORAL DAILY
Status: DISCONTINUED | OUTPATIENT
Start: 2021-03-22 | End: 2021-03-22 | Stop reason: HOSPADM

## 2021-03-21 RX ORDER — LOSARTAN POTASSIUM AND HYDROCHLOROTHIAZIDE 25; 100 MG/1; MG/1
1 TABLET ORAL DAILY
COMMUNITY
End: 2021-03-24

## 2021-03-21 RX ORDER — ACETAMINOPHEN 325 MG/1
975 TABLET ORAL ONCE
Status: DISCONTINUED | OUTPATIENT
Start: 2021-03-21 | End: 2021-03-21

## 2021-03-21 RX ORDER — ONDANSETRON 4 MG/1
4 TABLET, ORALLY DISINTEGRATING ORAL EVERY 6 HOURS PRN
Status: DISCONTINUED | OUTPATIENT
Start: 2021-03-21 | End: 2021-03-22 | Stop reason: HOSPADM

## 2021-03-21 RX ORDER — CEFTRIAXONE 1 G/1
1 INJECTION, POWDER, FOR SOLUTION INTRAMUSCULAR; INTRAVENOUS ONCE
Status: COMPLETED | OUTPATIENT
Start: 2021-03-21 | End: 2021-03-21

## 2021-03-21 RX ORDER — PREDNISONE 5 MG/1
5 TABLET ORAL DAILY
Status: DISCONTINUED | OUTPATIENT
Start: 2021-03-21 | End: 2021-03-22 | Stop reason: HOSPADM

## 2021-03-21 RX ORDER — CEFTRIAXONE 1 G/1
1 INJECTION, POWDER, FOR SOLUTION INTRAMUSCULAR; INTRAVENOUS EVERY 24 HOURS
Status: DISCONTINUED | OUTPATIENT
Start: 2021-03-22 | End: 2021-03-22 | Stop reason: HOSPADM

## 2021-03-21 RX ORDER — SODIUM CHLORIDE 9 MG/ML
INJECTION, SOLUTION INTRAVENOUS CONTINUOUS
Status: ACTIVE | OUTPATIENT
Start: 2021-03-21 | End: 2021-03-22

## 2021-03-21 RX ORDER — AMOXICILLIN 250 MG
2 CAPSULE ORAL 2 TIMES DAILY PRN
Status: DISCONTINUED | OUTPATIENT
Start: 2021-03-21 | End: 2021-03-22 | Stop reason: HOSPADM

## 2021-03-21 RX ORDER — AMOXICILLIN 250 MG
1 CAPSULE ORAL 2 TIMES DAILY PRN
Status: DISCONTINUED | OUTPATIENT
Start: 2021-03-21 | End: 2021-03-22 | Stop reason: HOSPADM

## 2021-03-21 RX ORDER — ONDANSETRON 2 MG/ML
4 INJECTION INTRAMUSCULAR; INTRAVENOUS EVERY 6 HOURS PRN
Status: DISCONTINUED | OUTPATIENT
Start: 2021-03-21 | End: 2021-03-22 | Stop reason: HOSPADM

## 2021-03-21 RX ORDER — POLYETHYLENE GLYCOL 3350 17 G/17G
17 POWDER, FOR SOLUTION ORAL DAILY PRN
Status: DISCONTINUED | OUTPATIENT
Start: 2021-03-21 | End: 2021-03-22 | Stop reason: HOSPADM

## 2021-03-21 RX ORDER — PROCHLORPERAZINE MALEATE 5 MG
5 TABLET ORAL EVERY 6 HOURS PRN
Status: DISCONTINUED | OUTPATIENT
Start: 2021-03-21 | End: 2021-03-22 | Stop reason: HOSPADM

## 2021-03-21 RX ADMIN — PREDNISONE 5 MG: 5 TABLET ORAL at 17:23

## 2021-03-21 RX ADMIN — SODIUM CHLORIDE: 9 INJECTION, SOLUTION INTRAVENOUS at 17:05

## 2021-03-21 RX ADMIN — ACETAMINOPHEN 650 MG: 325 TABLET, FILM COATED ORAL at 19:28

## 2021-03-21 RX ADMIN — ACETAMINOPHEN 650 MG: 325 TABLET ORAL at 13:15

## 2021-03-21 RX ADMIN — CEFTRIAXONE SODIUM 1 G: 1 INJECTION, POWDER, FOR SOLUTION INTRAMUSCULAR; INTRAVENOUS at 14:53

## 2021-03-21 RX ADMIN — SODIUM CHLORIDE 1000 ML: 9 INJECTION, SOLUTION INTRAVENOUS at 13:15

## 2021-03-21 RX ADMIN — ONDANSETRON 4 MG: 2 INJECTION INTRAMUSCULAR; INTRAVENOUS at 13:17

## 2021-03-21 RX ADMIN — SERTRALINE HYDROCHLORIDE 50 MG: 50 TABLET ORAL at 17:23

## 2021-03-21 ASSESSMENT — MIFFLIN-ST. JEOR
SCORE: 1146.77
SCORE: 1134.52

## 2021-03-21 ASSESSMENT — ENCOUNTER SYMPTOMS
DIARRHEA: 1
NAUSEA: 1
WEAKNESS: 1

## 2021-03-21 ASSESSMENT — ACTIVITIES OF DAILY LIVING (ADL): ADLS_ACUITY_SCORE: 16

## 2021-03-21 NOTE — H&P
[facility]    History and Physical - Hospitalist Service       Date of Admission:  3/21/2021    Assessment & Plan   Batsheva Fuentes is a 75 year old female admitted on 3/21/2021.  Past medical history of hypertension, depression, PMR, DVT no longer on anticoagulation, presumed recent issues with diverticulitis with possible upcoming partial colectomy.      Fever, possibly due to UTI  Presents with fever of 102.7, no other SIRS criteria present.  Urinalysis abnormal with greater than 182 WBCs, large leukocyte esterase and positive nitrates, however, she denies any urinary symptoms.  CXR clear.  Per her history, it sounds as though she had diverticulitis in December with ongoing symptoms since that time and is planned for partial colectomy in early April.  She does endorse some increase in left lower quadrant pain over the past week raising question of this as a potential source for fever.    -We will attempt to obtain outside records tomorrow  from her primary care physician, Esthela Gutierrez at [facility]  -Hold on repeat CT abdomen/pelvis as she reports this was done in January and would like to limit radiation exposure, though low threshold to rescan if increasing abdominal pain  -Continue ceftriaxone for UTI and follow blood and urine cultures  -Low threshold to add Flagyl if persistently febrile    Possible acute metabolic encephalopathy  ED physician reported confusion at home in ED nurses noted impulsiveness and forgetfulness.  She is currently alert and oriented x3, though was a somewhat difficult historian.  Was unable to contact her  at home for further history.  -Monitor, frequent reorientation as needed    Hypertension  -Continue PTA losartan once verified.    PMR  -Continue PTA prednisone once verified    Hypothyroidism  -Continue PTA levothyroxine once verified    Depression  -Continue PTA duloxetine once verified       Diet:  Low fiber diet  DVT  "Prophylaxis: Pneumatic Compression Devices  Izaguirre Catheter: not present  Code Status:  Full code per patient  Rule Out COVID-19 Handoff:  Batsheva is a LOW SUSPICION PUI.  Follow these instructions:    If COVID test positive -> continue isolation precautions    If COVID test negative -> discontinue COVID-specific isolation precautions       Disposition Plan   Expected discharge: 2 - 3 days, recommended to prior living arrangement once Fever resolved, antibiotic plan in place.  Entered: Luigi Espinal MD 03/21/2021, 2:52 PM     The patient's care was discussed with the Patient.    Luigi Espinal MD  [facility]  Contact information available via [facility] Paging/Directory      ______________________________________________________________________    Chief Complaint   nausea    History is obtained from the patient, chart review, discussion with emergency room provider.    History of Present Illness   Batsheva Fuentes is a 75 year old female who presents with what she reports is \"shaking\" at home.  She reports she has had intermittent shaking off and on for several months.  She reports intermittent nausea since December as well as lower abdominal discomfort since December which she attributes to diverticulitis.  She reports that she has plan to have part of her colon removed in early April.  She reports that she has very poor appetite but has been forcing herself to eat, though she does feel her nausea has been getting worse over the past week.  She feels that her lower abdominal pain may also be slightly worse over the past week.  She reports some general increased weakness over the past few days.  She denies any fevers or chills, denies any dysuria, urinary urgency or frequency.  She denies any cough, shortness of breath, headache, sore throat, general myalgias or arthralgias, changes in smell or taste.  She denies any rash or sores.  She denies any chest pain or pressure, palpitations or " lightheadedness.  She denies any focal neurologic symptoms or confusion.      Review of Systems    The 10 point Review of Systems is negative other than noted in the HPI or here.     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  She resides with her  at home.  Does not require the use of any ambulatory aids.    Family History     Reports father had a CABG and congestive heart failure.    Prior to Admission Medications   Prior to Admission Medications   Prescriptions Last Dose Informant Patient Reported? Taking?   Cyanocobalamin (B-12) 1000 MCG TBCR   Yes No   Sig: Take 1 tablet by mouth daily   DULoxetine (CYMBALTA) 30 MG capsule   Yes No   Sig: Take 30 mg by mouth daily   calcium citrate-vitamin D (CITRACAL) 315-200 MG-UNIT TABS per tablet   Yes No   Sig: Take 2 tablets by mouth daily   levothyroxine (SYNTHROID/LEVOTHROID) 75 MCG tablet   Yes No   Sig: Take 1 tablet by mouth   losartan (COZAAR) 100 MG tablet   Yes No   Sig: Take 100 mg by mouth daily   predniSONE (DELTASONE) 5 MG tablet   Yes No   Sig: Take 5 mg by mouth daily      Facility-Administered Medications: None     Allergies   No Known Allergies    Physical Exam   Vital Signs: Temp: 102.7  F (39.3  C) Temp src: Oral BP: 127/73 Pulse: 89   Resp: 20 SpO2: 96 % O2 Device: None (Room air)    Weight: 136 lbs 0 oz    General Appearance: Well-nourished female in no acute distress  Eyes: Pupils equal, round reactive to light, sclera anicteric  HEENT: Mucous membranes moist, no neck lymphadenopathy  Respiratory: Lungs clear to auscultation bilaterally, no wheeze or  crackles, no tachypnea  Cardiovascular: Regular rate and rhythm, normal S1/S2, no murmurs rubs or gallops  GI: Abdomen soft, left-sided tenderness to palpation, nondistended, normal bowel sounds  Lymph/Hematologic: No peripheral edema  Skin: No rash or bruising  Musculoskeletal: Extremities warm well perfused  Neurologic: Alert, oriented x3, cranial nerves grossly intact  Psychiatric: Normal affect    Data   Data reviewed today: I reviewed all medications, new labs and imaging results over the last 24 hours. I personally reviewed no images or EKG's today.    Recent Labs   Lab 03/21/21  1302   WBC 10.5   HGB 9.9*   MCV 85         POTASSIUM 3.6   CHLORIDE 100   CO2 26   BUN 13   CR 0.83   ANIONGAP 7   ABBEY 8.5   GLC 98   ALBUMIN 2.9*   PROTTOTAL 7.2   BILITOTAL 0.4   ALKPHOS 58   ALT 14   AST 10   TROPI <0.015     Recent Results (from the past 24 hour(s))   XR Chest Port 1 View    Narrative    CHEST PORTABLE ONE VIEW   3/21/2021 1:35 PM     HISTORY: Fever.    COMPARISON: Chest x-ray on 12/17/2020.      Impression    IMPRESSION: AP view of the chest was obtained. Stable  cardiomediastinal silhouette. No suspicious focal pulmonary opacities.  No significant pleural effusion or pneumothorax.     MARCELL SERNA MD

## 2021-03-21 NOTE — ED NOTES
This RN entered pt's room to find her standing at bedside with hospital gown off and pt putting her clothes back on. Pt stated she was done and wanting to go home. This RN informed pt that we still had tests to do on her and she wasn't ready to go. Pt assisted back into gown and assisted back to bed. Curtain to room opened to better visualize her.

## 2021-03-21 NOTE — ED PROVIDER NOTES
"    History     Chief Complaint:  Generalized Weakness     History limited by: patient is a poor historian.     HPI:  Batsheva Fuentes is a 75 year old female with a history of hypertension and DVT who presents with generalized weakness and fever of 101 degrees. She states that her diarrhea and generalized weakness have been intermittent since December 2020. She also reports some nausea although no vomiting, and a tremor is noted here. Patient thinks her weakness may be related to previous diverticulitis. Patient has been taking Tylenol to relieve her symptoms. Denies syncopal episodes, pain, or difficulty ambulating. No history of urine culture growths.    Review of Systems   Gastrointestinal: Positive for diarrhea and nausea.   Neurological: Positive for weakness. Negative for syncope.   All other systems reviewed and are negative.    Allergies:  No Known Allergies     Medications:    Cymbalta  Levothyroxine  Losartan  Prednisone     Past Medical History:    Hypertension  Right leg DVT     Past Surgical History:    Tonsillectomy   Hysterectomy   Brain aneurysm surgery  Sinus surgery     Family History:    Heart disease  Diabetes   Cancer     Social History:  Patient presents alone.    Physical Exam     Vitals:  Patient Vitals for the past 24 hrs:   BP Temp Temp src Pulse Resp SpO2 Height Weight   03/21/21 1430 -- -- -- -- -- 96 % -- --   03/21/21 1415 -- 102.7  F (39.3  C) Oral -- -- -- -- --   03/21/21 1400 127/73 -- -- 89 -- 95 % -- --   03/21/21 1330 -- -- -- 83 -- 95 % -- --   03/21/21 1119 (!) 157/69 101  F (38.3  C) Oral 96 20 96 % 1.702 m (5' 7\") 61.7 kg (136 lb)       Physical Exam:  Nursing note and vitals reviewed.  General: Oriented to person, place, and time. Appears well-developed and well-nourished.   Head: No signs of trauma.   Mouth/Throat: Oropharynx is clear and moist.   Eyes: Conjunctivae are normal. Pupils are equal, round, and reactive to light.   Neck: Normal range of motion. No nuchal " rigidity.   Cardiovascular: Normal rate and regular rhythm.    Respiratory: Effort normal and breath sounds normal. No respiratory distress.   Abdominal: Soft. There is no tenderness. There is no guarding.   Musculoskeletal: Normal range of motion. no edema.   Neurological: The patient is alert and oriented to person, place, and time.  PERRLA, EOMI, visual fields intact, strength in upper/lower extremities normal and symmetrical.   Sensation normal. Speech normal  GCS eye subscore is 4. GCS verbal subscore is 5. GCS motor subscore is 6.   Skin: Skin is warm and dry. No rash noted.   Psychiatric: normal mood and affect. behavior is normal.     Emergency Department Course     Imaging:  XR Chest portable  AP view of the chest was obtained. Stable  cardiomediastinal silhouette. No suspicious focal pulmonary opacities.  No significant pleural effusion or pneumothorax.   Per radiology.    Laboratory:  CBC: WBC 10.5, HGB 9.9 (L),      CMP: Albumin 2.9 (L) o/w WNL (Creatinine 0.83)     Troponin (Collected 1302): <0.015    Lactic Acid (Resulted: 1312): 1.2    UA: moderate (A), albumin 70 (A), nitrite positive (A), leukocyte esterase large (A), RBC/HPF 17 (H), >182 (H), WBC clumps present (A), Bacteria many (A), Mucous present (A) o/w WNL    Symptomatic Influenza A/B COVID-19 virus by PCR: negative    Blood culture x2: pending  Urine culture: pending    Emergency Department Course:  Reviewed:  1230  Past medical records, Care Everywhere, nursing notes, and vitals reviewed.     Assessments:  1235 I performed an exam of the patient and obtained history, as documented above.  1329 Patient rechecked and updated. Symptoms unchanged.  1423 Patient rechecked and updated.     Consults:  1431 I spoke with Dr. Espinal of hospitalist service regarding patient's presentation, findings, and plan of care.    Interventions:  1315 0.9% NS bolus, 1000 mL, IV  1315 Tylenol, 650 mg, PO  1317 Zofran, 4 mg, IV  1453 Rocephin, 1 g,  IV    Disposition:  The patient was admitted to the hospital under the care of Dr. Espinal.     Impression & Plan     CMS Diagnoses:        Covid-19:  Batsheva Fuentes was evaluated during a global COVID-19 pandemic, which necessitated consideration that the patient might be at risk for infection with the SARS-CoV-2 virus that causes COVID-19.   Applicable protocols for evaluation were followed during the patient's care.   COVID-19 was considered as part of the patient's evaluation. The plan for testing is:  a test was obtained during this visit.    Medical Decision Making:  Batsheva Fuentes is a 75 year old female who presents for evaluation of weakness fever and confusion.  This clinically is consistent with a urinary tract infection.  Urinalysis confirms the infection.  There has been fever, but no back/flank pain or significant abdominal pain.  Given the results of the urinalysis, would treat with a pyelonephritis course of antibiotics.  There are mild systemic symptoms present.  There is no clinical evidence of appendicitis, colitis, diverticulitis or any intraabdominal catastrophe. The patient will be started on antibiotics for the infection.  Because of her confusion she will require admission to the hospital for further evaluation and treatment    Diagnosis:    ICD-10-CM    1. Fever, unspecified fever cause  R50.9 Symptomatic Influenza A/B & SARS-CoV2 (COVID-19) Virus PCR Multiplex   2. Urinary tract infection with hematuria, site unspecified  N39.0     R31.9    3. Encephalopathy  G93.40         Discharge Medications:  New Prescriptions    No medications on file       Scribe Disclosure:  I, Vilma Self, am serving as a scribe at 12:34 PM on 3/21/2021 to document services personally performed by Varun Zarco MD based on my observations and the provider's statements to me.       Vilma Self  3/21/2021     Varun Zarco MD  03/21/21 2628

## 2021-03-21 NOTE — ED TRIAGE NOTES
Generalized weakness for past few days with nausea denies any vomiting - pt feeling dehydrated   Pt scheduled for partial colon to be removed on April 8

## 2021-03-21 NOTE — ED NOTES
[facility]  ED Nurse Handoff Report    ED Chief complaint: Generalized Weakness      ED Diagnosis:   Final diagnoses:   Fever, unspecified fever cause   Urinary tract infection with hematuria, site unspecified   Encephalopathy       Code Status: to be addressed by admitting    Allergies: No Known Allergies    Patient Story: Pt arrived to ED from home. Pt has been c/o feeling weak for last day. Pt was found to have a fever in the ED. Pt has hx of colitis and is scheduled to have surgery in the beginning of April. Pt reports having chronic nausea. Pt was straight cathed for urine and found to have a UTI. Pt is slightly confused but redirectable.    Focused Assessment:  resp- rate pattern and depth reg  Cardiac- VSS  Neuro-sl confusion  GI- chronic nausea  - UTI    Treatments and/or interventions provided: antibiotic, fluid bolus and tylenol, zofran    Patient's response to treatments and/or interventions: nausea sl improved     To be done/followed up on inpatient unit:  admission orders    Does this patient have any cognitive concerns?: Forgetful    Activity level - Baseline/Home:  Independent  Activity Level - Current:   Stand with Assist    Patient's Preferred language: English   Needed?: No    Isolation: COVID r/o and special precautions  Infection: Not Applicable  Patient tested for COVID 19 prior to admission: YES  Bariatric?: No    Vital Signs:   Vitals:    03/21/21 1330 03/21/21 1400 03/21/21 1415 03/21/21 1430   BP:  127/73     Pulse: 83 89     Resp:       Temp:   102.7  F (39.3  C)    TempSrc:   Oral    SpO2: 95% 95%  96%   Weight:       Height:           Cardiac Rhythm:     Was the PSS-3 completed:   Yes  What interventions are required if any?               Family Comments:  was sent home d/t fever  OBS brochure/video discussed/provided to patient/family: N/A              Name of person given brochure if not patient:               Relationship to patient:     For the  majority of the shift this patient's behavior was Green.   Behavioral interventions performed were .    ED NURSE PHONE NUMBER: *[phone]

## 2021-03-21 NOTE — PHARMACY-ADMISSION MEDICATION HISTORY
Pharmacy Medication History  Admission medication history interview status for the 3/21/2021  admission is complete. See EPIC admission navigator for prior to admission medications     Location of Interview: Phone  Medication history sources: Dewayne and patient's  Onesimo.    Significant changes made to the medication list:  Losartan changed to Losartan/HCTZ -  reported that is what she is taking. 1/29/21 Pharmacist visit reported discontinuing the combination product and her potassium, 90 day supply was filled at that time. [employer] mail order filled the combination product on 2/16/21.    In the past week, patient estimated taking medication this percent of the time: greater than 90%    Additional medication history information:   Patient's  said he will bring back her list when he comes to visit.    Medication reconciliation completed by provider prior to medication history? No    Time spent in this activity: 45 minutes    Prior to Admission medications    Medication Sig Last Dose Taking? Auth Provider   DULoxetine (CYMBALTA) 30 MG capsule Take 30 mg by mouth daily 3/20/2021 at Unknown time Yes Reported, Patient   levothyroxine (SYNTHROID/LEVOTHROID) 75 MCG tablet Take 1 tablet by mouth 3/20/2021 at Unknown time Yes Reported, Patient   losartan-hydrochlorothiazide (HYZAAR) 100-25 MG tablet Take 1 tablet by mouth daily 3/21/2021 at am Yes Unknown, Entered By History   predniSONE (DELTASONE) 5 MG tablet Take 5 mg by mouth daily 3/20/2021 at Unknown time Yes Reported, Patient   sertraline (ZOLOFT) 50 MG tablet Take 50 mg by mouth daily 3/20/2021 at Unknown time Yes Unknown, Entered By History   calcium citrate-vitamin D (CITRACAL) 315-200 MG-UNIT TABS per tablet Take 2 tablets by mouth daily   Reported, Patient   Cyanocobalamin (B-12) 1000 MCG TBCR Take 1 tablet by mouth daily   Reported, Patient       The information provided in this note is only as accurate as the sources available at the time  of update(s)

## 2021-03-22 ENCOUNTER — APPOINTMENT (OUTPATIENT)
Dept: CT IMAGING | Facility: CLINIC | Age: 76
DRG: 392 | End: 2021-03-22
Attending: HOSPITALIST
Payer: COMMERCIAL

## 2021-03-22 VITALS
WEIGHT: 138.7 LBS | RESPIRATION RATE: 16 BRPM | HEIGHT: 67 IN | HEART RATE: 69 BPM | OXYGEN SATURATION: 97 % | TEMPERATURE: 99 F | DIASTOLIC BLOOD PRESSURE: 80 MMHG | SYSTOLIC BLOOD PRESSURE: 140 MMHG | BODY MASS INDEX: 21.77 KG/M2

## 2021-03-22 LAB
BACTERIA SPEC CULT: ABNORMAL
ERYTHROCYTE [DISTWIDTH] IN BLOOD BY AUTOMATED COUNT: 15.9 % (ref 10–15)
HCT VFR BLD AUTO: 29.5 % (ref 35–47)
HGB BLD-MCNC: 8.8 G/DL (ref 11.7–15.7)
LACTATE BLD-SCNC: 0.7 MMOL/L (ref 0.7–2)
Lab: ABNORMAL
MCH RBC QN AUTO: 25.7 PG (ref 26.5–33)
MCHC RBC AUTO-ENTMCNC: 29.8 G/DL (ref 31.5–36.5)
MCV RBC AUTO: 86 FL (ref 78–100)
PLATELET # BLD AUTO: 291 10E9/L (ref 150–450)
POTASSIUM SERPL-SCNC: 3.8 MMOL/L (ref 3.4–5.3)
RBC # BLD AUTO: 3.43 10E12/L (ref 3.8–5.2)
SPECIMEN SOURCE: ABNORMAL
WBC # BLD AUTO: 8.7 10E9/L (ref 4–11)

## 2021-03-22 PROCEDURE — 84132 ASSAY OF SERUM POTASSIUM: CPT | Performed by: HOSPITALIST

## 2021-03-22 PROCEDURE — 250N000011 HC RX IP 250 OP 636: Performed by: HOSPITALIST

## 2021-03-22 PROCEDURE — 36415 COLL VENOUS BLD VENIPUNCTURE: CPT | Performed by: HOSPITALIST

## 2021-03-22 PROCEDURE — 99239 HOSP IP/OBS DSCHRG MGMT >30: CPT | Performed by: HOSPITALIST

## 2021-03-22 PROCEDURE — 250N000009 HC RX 250: Performed by: HOSPITALIST

## 2021-03-22 PROCEDURE — 74177 CT ABD & PELVIS W/CONTRAST: CPT

## 2021-03-22 PROCEDURE — 250N000013 HC RX MED GY IP 250 OP 250 PS 637: Performed by: HOSPITALIST

## 2021-03-22 PROCEDURE — 83605 ASSAY OF LACTIC ACID: CPT | Performed by: HOSPITALIST

## 2021-03-22 PROCEDURE — 120N000001 HC R&B MED SURG/OB

## 2021-03-22 PROCEDURE — 85027 COMPLETE CBC AUTOMATED: CPT | Performed by: HOSPITALIST

## 2021-03-22 PROCEDURE — 250N000012 HC RX MED GY IP 250 OP 636 PS 637: Performed by: HOSPITALIST

## 2021-03-22 RX ORDER — IOPAMIDOL 755 MG/ML
70 INJECTION, SOLUTION INTRAVASCULAR ONCE
Status: COMPLETED | OUTPATIENT
Start: 2021-03-22 | End: 2021-03-22

## 2021-03-22 RX ORDER — LEVOTHYROXINE SODIUM 75 UG/1
75 TABLET ORAL
Status: DISCONTINUED | OUTPATIENT
Start: 2021-03-22 | End: 2021-03-22 | Stop reason: HOSPADM

## 2021-03-22 RX ORDER — CIPROFLOXACIN 500 MG/1
500 TABLET, FILM COATED ORAL 2 TIMES DAILY
Qty: 18 TABLET | Refills: 0 | Status: SHIPPED | OUTPATIENT
Start: 2021-03-22 | End: 2021-03-31

## 2021-03-22 RX ORDER — METRONIDAZOLE 500 MG/1
500 TABLET ORAL 3 TIMES DAILY
Qty: 27 TABLET | Refills: 0 | Status: SHIPPED | OUTPATIENT
Start: 2021-03-22 | End: 2021-03-31

## 2021-03-22 RX ADMIN — LEVOTHYROXINE SODIUM 75 MCG: 75 TABLET ORAL at 12:39

## 2021-03-22 RX ADMIN — SODIUM CHLORIDE 60 ML: 9 INJECTION, SOLUTION INTRAVENOUS at 10:36

## 2021-03-22 RX ADMIN — PREDNISONE 5 MG: 5 TABLET ORAL at 08:37

## 2021-03-22 RX ADMIN — METRONIDAZOLE 500 MG: 500 INJECTION, SOLUTION INTRAVENOUS at 12:40

## 2021-03-22 RX ADMIN — SERTRALINE HYDROCHLORIDE 50 MG: 50 TABLET ORAL at 08:37

## 2021-03-22 RX ADMIN — IOPAMIDOL 70 ML: 755 INJECTION, SOLUTION INTRAVENOUS at 10:36

## 2021-03-22 RX ADMIN — LOSARTAN POTASSIUM 100 MG: 100 TABLET, FILM COATED ORAL at 08:37

## 2021-03-22 RX ADMIN — ACETAMINOPHEN 650 MG: 325 TABLET, FILM COATED ORAL at 08:40

## 2021-03-22 RX ADMIN — ONDANSETRON 4 MG: 4 TABLET, ORALLY DISINTEGRATING ORAL at 08:44

## 2021-03-22 ASSESSMENT — ACTIVITIES OF DAILY LIVING (ADL)
ADLS_ACUITY_SCORE: 16
HEARING_MANAGEMENT: HAS HEARING AIDES
DESCRIBE_HEARING_LOSS: BILATERAL HEARING LOSS
WERE_AUXILIARY_AIDS_OFFERED?: NO
VISION_MANAGEMENT: GLASSES
ADLS_ACUITY_SCORE: 19
ADLS_ACUITY_SCORE: 17
HEARING_DIFFICULTY_OR_DEAF: YES
USE_OF_HEARING_ASSISTIVE_DEVICES: RIGHT HEARING AID
PATIENT'S_PREFERRED_MEANS_OF_COMMUNICATION: VERBAL
ADLS_ACUITY_SCORE: 19
THE_FOLLOWING_AIDS_WERE_PROVIDED;: PATIENT DECLINED OFFER OF AUXILIARY AIDS
WEAR_GLASSES_OR_BLIND: YES

## 2021-03-22 NOTE — DISCHARGE SUMMARY
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     Contact [facility] to arrange a follow up phone visit or   clinic visit prior to your upcoming surgery.             Unresulted Labs Ordered in the Past 30 Days of this Admission     Date and Time Order Name Status Description    3/21/2021 1410 Urine Culture Aerobic Bacterial Preliminary     3/21/2021 1309 Blood culture ONE site Preliminary     3/21/2021 1304 Blood culture ONE site Preliminary       These results will be followed up by: Hospitalist service    Discharge Disposition   Discharged to home  Condition at discharge: Stable    Hospital Course         Batsheva Fuentes is a 75 year old female admitted on 3/21/2021.  Past medical history of hypertension, depression, PMR, DVT no longer on anticoagulation, presumed recent issues with diverticulitis with possible upcoming partial colectomy.      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  Urinalysis abnormal with greater than 182 WBCs, large leukocyte esterase and positive nitrates (without urinary symptoms), culture with >100K E.coli, sensitivities pending.  Hx of diverticulitis in December 2020 with plan for partial colectomy in early April.  Due to increase in LLQ pain, CT abd/pelvis obtained showing recurrent vs persistent sigmoid diverticulitis with interval development of intramural gas collections without drainable abscess as well as  findings consistent with colovesical fistula.  Her outpatient colorectal surgeon was contacted who recommended ongoing antibiotics, plan for partial colectomy as currently scheduled. Fever curve improving but remained febrile (100.9) at discharge, but due to [facility] being out of network, they requested discharge home.  Suspect she will continue to improve with ongoing oral antibiotics so did not attempt direct transfer to [place] [place].  Will discharge home on 10-day course of cipro and flagyl.  She should contact colorectal surgery clinic prior to her scheduled surgery for possible phone visit or clinic visit.     Acute metabolic encephalopathy, resolved  ED physician reported confusion at home in ED nurses noted impulsiveness and forgetfulness though was oriented x3 at admission.   reported she was back to baseline 3/22.     Hypertension  Continue PTA losartan     PMR  Continue PTA prednisone     Hypothyroidism  Continue PTA levothyroxine    Depression  Continue PTA duloxetine         Consultations This Hospital Stay   CARE MANAGEMENT / SOCIAL WORK IP CONSULT    Code Status   Full Code    Time Spent on this Encounter   I, Luigi Espinal MD, personally saw the patient today and spent greater than 30 minutes discharging this patient.       Luigi Espinal MD  [place] [address] [facility]  [address]  [place] [address]  Phone: [phone]  ______________________________________________________________________    Physical Exam   Vital Signs: Temp: 99  F (37.2  C) Temp src: Oral BP: (!) 140/80 Pulse: 69   Resp: 16 SpO2: 97 % O2 Device: None (Room air)    Weight: 138 lbs 11.2 oz  General Appearance:  Thin elderly female in NAD  Respiratory: lungs CTAB, no wheezes or crackles, no tachypnea  Cardiovascular: RRR, normal s1/s2 without murmur  GI: abdomen soft, normal bowel sounds, left lower quadrant tenderness to palpation, nondistended  Other:  Alert and appropriate, cranial nerves  grossly intact        Primary Care Physician   Eileen Go    Discharge Orders      Reason for your hospital stay    You were admitted for fever which is due to recurrent diverticulitis and formation of a fistula (abnormal connection) between your bowel and your bladder which is a complication of your prior episode of diverticulitis.     Follow-up and recommended labs and tests     Follow up with primary care provider, Eileen Go, as scheduled.   Contact [facility] to arrange a follow up phone visit or clinic visit prior to your upcoming surgery.     Activity    Your activity upon discharge: activity as tolerated     Full Code     Diet    Follow this diet upon discharge:       Combination Diet Low Fiber       Significant Results and Procedures   Most Recent 3 CBC's:  Recent Labs   Lab Test 03/22/21  0615 03/21/21  1302 12/28/20  1509   WBC 8.7 10.5 19.0*   HGB 8.8* 9.9* 11.3*   MCV 86 85 83    372 580*     Most Recent 3 BMP's:  Recent Labs   Lab Test 03/22/21  1030 03/21/21  1840 03/21/21  1302 12/28/20  1509 12/17/20  1200   NA  --   --  133 135 135   POTASSIUM 3.8 3.5 3.6 3.2* 3.1*   CHLORIDE  --   --  100 98 98   CO2  --   --  26 28 29   BUN  --   --  13 14 16   CR  --   --  0.83 0.82 0.78   ANIONGAP  --   --  7 9 8   ABBEY  --   --  8.5 9.5 9.3   GLC  --   --  98 102* 110*     Most Recent 2 LFT's:  Recent Labs   Lab Test 03/21/21  1302 12/28/20  1509   AST 10 6   ALT 14 12   ALKPHOS 58 69   BILITOTAL 0.4 0.5   ,   Results for orders placed or performed during the hospital encounter of 03/21/21   XR Chest Port 1 View    Narrative    CHEST PORTABLE ONE VIEW   3/21/2021 1:35 PM     HISTORY: Fever.    COMPARISON: Chest x-ray on 12/17/2020.      Impression    IMPRESSION: AP view of the chest was obtained. Stable  cardiomediastinal silhouette. No suspicious focal pulmonary opacities.  No significant pleural effusion or pneumothorax.     MARCELL SERNA MD   CT Abdomen Pelvis  w Contrast    Narrative    CT ABDOMEN AND PELVIS WITH CONTRAST 3/22/2021 10:46 AM    CLINICAL HISTORY: Diverticulitis, complication suspected; history of  diverticulitis 12/2020, upcoming plan for partial colectomy, presents  with new fever.    TECHNIQUE: CT scan of the abdomen and pelvis was performed following  injection of IV contrast. Multiplanar reformats were obtained. Dose  reduction techniques were used.    CONTRAST:  70mL Isovue-370    COMPARISON: 12/28/2020    FINDINGS:   LOWER CHEST: New trace bilateral pleural effusions. Mild adjacent  compressive atelectasis.    HEPATOBILIARY: Stable hepatic cyst. Cholelithiasis.    PANCREAS: Normal.    SPLEEN: Normal.    ADRENAL GLANDS: Normal.    KIDNEYS/BLADDER: Normal kidneys.    BOWEL: Diffuse colonic diverticulosis. Moderate inflammation involving  proximal and mid sigmoid colon. New intramural gas representing  fistula or small communicating abscesses. There is also a small gas  collection between the sigmoid colon and bladder measuring 1.4 cm. The  adjacent bladder wall is thickened. There is gas in the bladder.    PELVIC ORGANS: Hysterectomy.    ADDITIONAL FINDINGS: None.    MUSCULOSKELETAL: Normal.      Impression    IMPRESSION:   1.  Persistent or recurrent sigmoid colonic diverticulitis.  2.  Interval development of several small intramural gas collections,  consistent with small abscesses or fistula. No drainable abscess.  3.  Small gas collection between the sigmoid colon and bladder, with  reactive bladder wall thickening as well as gas in the bladder lumen.  Findings consistent with a colovesical fistula.    CARTER JON MD       Discharge Medications   Current Discharge Medication List      START taking these medications    Details   ciprofloxacin (CIPRO) 500 MG tablet Take 1 tablet (500 mg) by mouth 2 times daily for 9 days  Qty: 18 tablet, Refills: 0    Associated Diagnoses: Diverticulitis of colon      metroNIDAZOLE (FLAGYL) 500 MG tablet Take 1  tablet (500 mg) by mouth 3 times daily for 9 days  Qty: 27 tablet, Refills: 0    Associated Diagnoses: Diverticulitis of colon         CONTINUE these medications which have NOT CHANGED    Details   levothyroxine (SYNTHROID/LEVOTHROID) 75 MCG tablet Take 1 tablet by mouth      losartan-hydrochlorothiazide (HYZAAR) 100-25 MG tablet Take 1 tablet by mouth daily      predniSONE (DELTASONE) 5 MG tablet Take 5 mg by mouth daily      sertraline (ZOLOFT) 50 MG tablet Take 50 mg by mouth daily      calcium citrate-vitamin D (CITRACAL) 315-200 MG-UNIT TABS per tablet Take 2 tablets by mouth daily      Cyanocobalamin (B-12) 1000 MCG TBCR Take 1 tablet by mouth daily           Allergies   No Known Allergies

## 2021-03-22 NOTE — PROGRESS NOTES
[facility]    Medicine Progress Note - Hospitalist Service       Date of Admission:  3/21/2021  Assessment & Plan       Batsheva Fuentes is a 75 year old female admitted on 3/21/2021.  Past medical history of hypertension, depression, PMR, DVT no longer on anticoagulation, presumed recent issues with diverticulitis with possible upcoming partial colectomy.      Fever, possibly due to UTI vs diverticulitis  Presents with fever of 102.7, no other SIRS criteria present.  Urinalysis abnormal with greater than 182 WBCs, large leukocyte esterase and positive nitrates, however, she denies any urinary symptoms.  CXR clear.  Per her history, it sounds as though she had diverticulitis in December with ongoing symptoms since that time and is planned for partial colectomy in early April.  She does endorse some increase in left lower quadrant pain over the past week raising question of this as a potential source for fever.    - attempting to obtain PCP records, Esthela Gutierrez at [facility]  - no change in pain and slightly more tender on exam today; will repeat CT abd/pelvis -  provided CD of outside CT which has been sent down to be scanned in  - consider CRS consult pending results from above; report outpatient surgeon was Dr. Ismael Whalen at [facility]  - remains febrile at 100.9 today, add flagyl IV  - continue ceftriaxone  - continue blood and urine cultures    Acute metabolic encephalopathy, resolved  ED physician reported confusion at home in ED nurses noted impulsiveness and forgetfulness though was oriented x3 at admission.   -  at bedside 3/22 and reports she is back to baseline    Hypertension  -Continue PTA losartan     PMR  -Continue PTA prednisone     Hypothyroidism  -Continue PTA levothyroxine    Depression  -Continue PTA duloxetine          Diet: Combination Diet Low Fiber    DVT Prophylaxis: Pneumatic Compression Devices  Izaguirre  Catheter: not present  Code Status: Full Code           Disposition Plan   Expected discharge: 1-2 days, recommended to prior living arrangement once antibiotic plan established and work-up complete.  Entered: Luigi Espinal MD 03/22/2021, 9:46 AM       The patient's care was discussed with the Bedside Nurse, Patient and Patient's Family.    Luigi Espinal MD  Hospitalist Service  [facility]  Contact information available via [facility] Paging/Directory    ______________________________________________________________________    Interval History    Initially indicates increased pain overnight but reporting somewhat improved this AM and ultimately reports no change.  Complains of nausea.  Subjective fever.  No dyspnea or other complaints.     Data reviewed today: I reviewed all medications, new labs and imaging results over the last 24 hours. I personally reviewed no images or EKG's today.    Physical Exam   Vital Signs: Temp: 100.9  F (38.3  C) Temp src: Oral BP: (!) 152/79 Pulse: 84   Resp: 16 SpO2: 94 % O2 Device: None (Room air)    Weight: 138 lbs 11.2 oz  General Appearance: Thin elderly female in NAD  Respiratory: lungs CTAB, no wheezes or crackles, no tachypnea  Cardiovascular: RRR, normal s1/s2 without murmur  GI: abdomen soft, normal bowel sounds, left lower quadrant tenderness to palpation, nondistended  Other: Alert and appropriate, cranial nerves grossly intact     Data   Recent Labs   Lab 03/22/21  0615 03/21/21  1840 03/21/21  1302   WBC 8.7  --  10.5   HGB 8.8*  --  9.9*   MCV 86  --  85     --  372   NA  --   --  133   POTASSIUM  --  3.5 3.6   CHLORIDE  --   --  100   CO2  --   --  26   BUN  --   --  13   CR  --   --  0.83   ANIONGAP  --   --  7   ABBEY  --   --  8.5   GLC  --   --  98   ALBUMIN  --   --  2.9*   PROTTOTAL  --   --  7.2   BILITOTAL  --   --  0.4   ALKPHOS  --   --  58   ALT  --   --  14   AST  --   --  10   TROPI  --   --  <0.015

## 2021-03-22 NOTE — CONSULTS
Care Management Consult  Case Management was consulted for insurance issues.  Pt has a [employer] Medicare Advantage plan.  Pt's spouse Roscoe was concerned as he thought that they may have a large out of pocket expense.  Pt was Observation and in the Observation unit. She was changed to Inpt status and transferred to .  Writer explained that unfortunately, pt's insurance is out of network for this hospital and that it would be best if he contacts the insurance company directly.  Roscoe has contacted [employer].  He would like his wife to be discharged home and then he would try to contact the Surgeon, Dr Whalen.  Explained the Hospitalist would need to decide if his spouse was medically stable for discharge or if we would need to transfer her to an in network  Hospital such as [place].  Dr Espinal is awaiting call back from the surgeon.    Addendum:  Pt is discharging home.  She will have surgical intervention as scheduled at [facility] on 4/6/21.    Kirsten Restrepo RN   Inpatient Care Management  [phone]

## 2021-03-22 NOTE — PROGRESS NOTES
SPIRITUAL HEALTH SERVICES Progress Note  [facility]    SH request.    I met with Ptnt Batsheva and her  Roscoe. They shared that Batsheva will be going home again and is preparing for surgery in a few weeks.    Batsheva said that prayer and her grandchildren are what bring uplift to her life. She said she is feeling good about her outlook.    I offered reflective conversation and emotional support, said a prayer and sang a blessing.      Kathie Kovacs  Chaplain Resident

## 2021-03-22 NOTE — UTILIZATION REVIEW
"  Admission Status; Secondary Review Determination     Admission Date: 3/21/2021 12:20 PM      Under the authority of the Utilization Management Committee, the utilization review process indicated a secondary review on the above patient.  The review outcome is based on review of the medical records, discussions with staff, and applying clinical experience noted on the date of the review.        (x)      Inpatient Status Appropriate - This patient's medical care is consistent with medical management for inpatient care and reasonable inpatient medical practice.      () Observation Status Appropriate - This patient does not meet hospital inpatient criteria and is placed in observation status. If this patient's primary payer is Medicare and was admitted as an inpatient, Condition Code 44 should be used and patient status changed to \"observation\".   () Admission Status NOT Appropriate - This patient's medical care is not consistent with medical management for Inpatient or Observation Status.          RATIONALE FOR DETERMINATION   Batsheva Fuentes is a 75 year old female admitted on 3/21/2021.  Past medical history of hypertension, depression, PMR, DVT no longer on anticoagulation, and presumed recent issues with diverticulitis with possible upcoming partial colectomy.  She presented to the emergency room with abdominal pain, nausea, weakness, and \" shaking.\"  She was found to have acute encephalopathy and fevers.  Suspected source of infection is urinary tract infection, although also with recent history of diverticulitis.  She is requiring IV antibiotics with ceftriaxone and metronidazole.  She is expected to require a prolonged stay at the hospital.  Due to this patient's pan stage, complex past medical history, acute encephalopathy and fevers concerning for acute infection as source, need for IV ceftriaxone and metronidazole, and expectation of a prolonged hospital stay, it is appropriate to have this patient " admitted to the hospital as an inpatient.         The severity of illness, intensity of service provided, expected LOS and risk for adverse outcome make the care complex, high risk and appropriate for hospital admission.        The information on this document is developed by the utilization review team in order for the business office to ensure compliance.  This only denotes the appropriateness of proper admission status and does not reflect the quality of care rendered.         The definitions of Inpatient Status and Observation Status used in making the determination above are those provided in the CMS Coverage Manual, Chapter 1 and Chapter 6, section 70.4.      Sincerely,     Shahid Gillis D.O.  Utilization Review/ Case Management  [facility].

## 2021-03-22 NOTE — PLAN OF CARE
Pt is an admission of the shift with gen weakness and fever.She is A&O but forgetful at times, elevated temps at times otherwise VSS on RA, temp controlled with Tylenol, up with SBA, amb to bathroom and voiding o.k, incontinent of BM at times.Velia low fiber diet,PIV is s/l, Rocephine.Discharge pending.

## 2021-03-22 NOTE — PLAN OF CARE
Received patient from Observation and CT. Had CT of abdomen. A/O. Taking small amounts of diet. Had soup for lunch. Voided. Denies pain. No nausea at present. Up with standby assist. IV dced. Discharge instructions and scripts given to patient. Patient discharged to  via w/c and escort.

## 2021-03-24 ENCOUNTER — VIRTUAL VISIT (OUTPATIENT)
Dept: PHARMACY | Facility: PHYSICIAN GROUP | Age: 76
End: 2021-03-24

## 2021-03-24 DIAGNOSIS — R11.0 NAUSEA: Primary | ICD-10-CM

## 2021-03-24 DIAGNOSIS — I10 BENIGN ESSENTIAL HYPERTENSION: ICD-10-CM

## 2021-03-24 DIAGNOSIS — K57.32 DIVERTICULITIS OF COLON: ICD-10-CM

## 2021-03-24 DIAGNOSIS — E03.9 HYPOTHYROIDISM, UNSPECIFIED TYPE: ICD-10-CM

## 2021-03-24 DIAGNOSIS — F43.21 ADJUSTMENT DISORDER WITH DEPRESSED MOOD: ICD-10-CM

## 2021-03-24 PROCEDURE — 99605 MTMS BY PHARM NP 15 MIN: CPT | Mod: TEL | Performed by: PHARMACIST

## 2021-03-24 PROCEDURE — 99607 MTMS BY PHARM ADDL 15 MIN: CPT | Mod: TEL | Performed by: PHARMACIST

## 2021-03-24 RX ORDER — HYOSCYAMINE SULFATE 0.125 MG
0.12 TABLET ORAL EVERY 4 HOURS PRN
COMMUNITY

## 2021-03-24 RX ORDER — LOSARTAN POTASSIUM 100 MG/1
100 TABLET ORAL DAILY
COMMUNITY

## 2021-03-24 NOTE — PROGRESS NOTES
Medication Therapy Management (MTM) Encounter    ASSESSMENT:                            Medication Adherence/Access: No issues identified, updated medication list to match current medications.     Nausea: stable.    Diverticulitis: Reviewed dosing regimen and will continue to space out iron while on the antibiotics.     Hypertension: Meeting BP goal of <150/90mmHg, but not tighter goal of <140/90mmHg. Recommend ongoing monitoring.     Hypothyroidism: Reviewed indication and necessity for this medication, has been maintaining her levels in range for many years, but is not over treated with the current dose. Recommend continuing same dose. Reviewed administration and the concern of absorption issues with AM administration with all other pills, suggest taking 1st thing upon waking then 30 mins later taking the remainder of her medications to limit the impact of the other medications on absorption. Should also have recheck of TSH 6-8 weeks after surgery to ensure dose is still appropriate.    Depression:  Stable.    PLAN:                            1. Move Levothyroxine to first thing in the morning, then take other medications 30 mins later.     7:30 Waking- take levothryoxine  8am all other RX pills- losartan, ciprofloxacin, sertraline, metronidazole  10am (2 hours later) Iron  Noon - 1 antibiotic - metronidazole   7pm - 2 antibiotic  - cipro + metronidazole  9pm (2 hours later) iron       Follow-up: as needed.     SUBJECTIVE/OBJECTIVE:                          Batsheva Fuentes is a 75 year old female called for a transitions of care visit. She was discharged from [facility] on 3/22 for diverticultis.      Reason for visit: Med questions- on the phone with patient,  and daughter today.    Allergies/ADRs: Reviewed in chart  Tobacco: She reports that she has never smoked. She has never used smokeless tobacco.  Alcohol: not currently using  Caffeine: no caffeine  Past Medical History: Reviewed in  chart      Medication Adherence/Access: had a lot of questions at primary doctor appointment prompting the MTM visit, however renetta was able to help sort out the medication timing and she has the current regimen=    8am all RX pills- Levothyroxine, losartan, ciprofloxacin, sertraline, metronidazole  10am (2 hours later) Iron  Noon - 1 antibiotic - metronidazole   7pm - 2 antibiotic  - cipro + metronidazole  9pm (2 hours later) iron     Nausea: started zofran as needed, only used 1 x so far and does feel it has been helpful.     Diverticulitis: ON both ciprofloxacin and metronidazole and will continue on Three times daily metronidazole until her surgery for partial colectomy which is planned for 4/8/2021. Hx of diverticulitis. Also has hyoscyamine but not taking this at this time.    Hypertension: Current medications include losartan 100mg daily.  Patient does not self-monitor blood pressure.  Patient reports no current medication side effects.  Last OV blood pressure= 140/76mmHg.     Hypothyroidism: Patient is taking levothyroxine 75 mcg daily. Patient is having the following symptoms: none. Has been on this dose since it was started, PCP records go back to 2005. TSH has always been at goal while on treatment. She is wondering if this medication is necessary.   TSH   Date Value Ref Range Status   12/17/2020 1.73 0.40 - 4.00 mU/L Final     Depression:  Current medications include: Sertraline 50mg once daily. Pt reports that depression symptoms are improved. No issues at this time.       Today's Vitals: There were no vitals taken for this visit.  ----------------  Post Discharge Medication Reconciliation Status: discharge medications reconciled and changed, per note/orders.    I spent 21 minutes with this patient today. All changes were made via collaborative practice agreement with Dr. Gutierrez. A copy of the visit note was provided to the patient's primary care provider.    The patient was sent via clinic portal a  summary of these recommendations.     Natasha Little, Pharm.D, [facility]  Medication Therapy Management Pharmacist  [phone]      Telemedicine Visit Details  Type of service:  Telephone visit  Start Time: 3:09 PM  End Time: 3:30 PM  Originating Location (patient location): Home  Distant Location (provider location):  [facility] [place]      Medication Therapy Recommendations  Hypothyroidism, unspecified type    Current Medication: levothyroxine (SYNTHROID/LEVOTHROID) 75 MCG tablet   Rationale: Incorrect administration - Dosage too low - Effectiveness   Recommendation: Change Medication - levothyroxine 75 MCG tablet - move to 30 mins before other medications   Status: Patient Agreed - Adherence/Education

## 2021-03-26 RX ORDER — ONDANSETRON 4 MG/1
4 TABLET, FILM COATED ORAL EVERY 8 HOURS PRN
COMMUNITY

## 2021-03-26 RX ORDER — FERROUS SULFATE 324(65)MG
324 TABLET, DELAYED RELEASE (ENTERIC COATED) ORAL 2 TIMES DAILY
COMMUNITY

## 2021-03-26 NOTE — PATIENT INSTRUCTIONS
Recommendations from today's MTM visit:                                                       1. Move Levothyroxine to first thing in the morning, then take other medications 30 mins later.      7:30 Waking- take levothryoxine  8am all other RX pills- losartan, ciprofloxacin, sertraline, metronidazole  10am (2 hours later) Iron  Noon - 1 antibiotic - metronidazole   7pm - 2 antibiotic  - cipro + metronidazole  9pm (2 hours later) iron          It was great to speak with you today.  I value your experience and would be very thankful for your time with providing feedback on our clinic survey. You may receive a survey via email or text message in the next few days.         My Clinical Pharmacist's contact information:                                                      Please feel free to contact me with any questions or concerns you have.      Natasha Little, Pharm.D, [facility]  Medication Therapy Management Pharmacist  [phone]

## 2021-03-27 LAB
BACTERIA SPEC CULT: NO GROWTH
BACTERIA SPEC CULT: NO GROWTH
SPECIMEN SOURCE: NORMAL
SPECIMEN SOURCE: NORMAL

## 2021-07-22 ENCOUNTER — RECORDS - HEALTHEAST (OUTPATIENT)
Dept: ADMINISTRATIVE | Facility: CLINIC | Age: 76
End: 2021-07-22

## 2021-09-05 ENCOUNTER — HEALTH MAINTENANCE LETTER (OUTPATIENT)
Age: 76
End: 2021-09-05

## 2021-11-14 PROBLEM — M25.561 BILATERAL KNEE PAIN: Status: RESOLVED | Noted: 2020-11-16 | Resolved: 2021-11-14

## 2021-11-14 PROBLEM — M79.18 BILATERAL BUTTOCK PAIN: Status: RESOLVED | Noted: 2019-11-08 | Resolved: 2021-11-14

## 2021-11-14 PROBLEM — M25.562 BILATERAL KNEE PAIN: Status: RESOLVED | Noted: 2020-11-16 | Resolved: 2021-11-14

## 2021-11-14 NOTE — PROGRESS NOTES
DISCHARGE REPORT    Batsheva did not return for further treatment after the last visit on 2/2/20.   Current status is unknown.  Please see information below for last known relevant information.  Patient seen for 3 visits.    SUBJECTIVE  Subjective changes noted by patient:  no c/o of hip pain today - reports much better after last visit  .  Current pain level is  .     Previous pain level was   .   Changes in function: (See Goal flowsheet attached for changes in current functional level)  Adverse reaction to treatment or activity: None    OBJECTIVE  Changes noted in objective findings:   able to do 10 sit to stand without arms - fatigue and quad sorenss but no knee or hip pain     ASSESSMENT/PLAN  Diagnosis: bilat knee pain/OA   Updated problem list and treatment plan:  Patient will continue to utilize HEP for any remaining deficits.   STG/LTGs have been met or progress has been made towards goals:  Please see goal flowsheet for most current information  Assessment of Progress: current status is unknown.    Last current status: Pt is progressing well   Self Management Plans:  HEP  I have re-evaluated this patient and find that the nature, scope, duration and intensity of the therapy is appropriate for the medical condition of the patient.  Batsheva continues to require the following intervention to meet STG and LTG's:  HEP.    Recommendations:  Discharge with current home program.  Patient to follow up with MD as needed.    Please refer to the daily flowsheet for treatment today, total treatment time and time spent performing 1:1 timed codes.

## 2022-02-20 ENCOUNTER — HEALTH MAINTENANCE LETTER (OUTPATIENT)
Age: 77
End: 2022-02-20

## 2022-05-11 ENCOUNTER — TRANSCRIBE ORDERS (OUTPATIENT)
Dept: OTHER | Age: 77
End: 2022-05-11

## 2022-05-11 DIAGNOSIS — R53.81 PHYSICAL DECONDITIONING: Primary | ICD-10-CM
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
## 2022-10-23 ENCOUNTER — HEALTH MAINTENANCE LETTER (OUTPATIENT)
Age: 77
End: 2022-10-23

## 2023-04-02 ENCOUNTER — HEALTH MAINTENANCE LETTER (OUTPATIENT)
Age: 78
End: 2023-04-02

## 2025-01-21 ENCOUNTER — TRANSFERRED RECORDS (OUTPATIENT)
Dept: HEALTH INFORMATION MANAGEMENT | Facility: CLINIC | Age: 80
End: 2025-01-21

## 2025-01-22 LAB
ALT SERPL-CCNC: 12 IU/L (ref 0–32)
AST SERPL-CCNC: 21 IU/L (ref 0–40)
CREATININE (EXTERNAL): 0.84 MG/DL (ref 0.57–1)
GFR ESTIMATED (EXTERNAL): 70 ML/MIN/1.73
GLUCOSE (EXTERNAL): 92 MG/DL (ref 70–99)
POTASSIUM (EXTERNAL): 4.2 MMOL/L (ref 3.5–5.2)

## 2025-06-15 ENCOUNTER — APPOINTMENT (OUTPATIENT)
Dept: GENERAL RADIOLOGY | Facility: CLINIC | Age: 80
End: 2025-06-15
Attending: PHYSICIAN ASSISTANT
Payer: COMMERCIAL

## 2025-06-15 ENCOUNTER — APPOINTMENT (OUTPATIENT)
Dept: GENERAL RADIOLOGY | Facility: CLINIC | Age: 80
End: 2025-06-15
Attending: EMERGENCY MEDICINE
Payer: COMMERCIAL

## 2025-06-15 ENCOUNTER — HOSPITAL ENCOUNTER (INPATIENT)
Facility: CLINIC | Age: 80
End: 2025-06-15
Attending: EMERGENCY MEDICINE | Admitting: HOSPITALIST
Payer: COMMERCIAL

## 2025-06-15 ENCOUNTER — APPOINTMENT (OUTPATIENT)
Dept: CT IMAGING | Facility: CLINIC | Age: 80
End: 2025-06-15
Attending: EMERGENCY MEDICINE
Payer: COMMERCIAL

## 2025-06-15 DIAGNOSIS — S72.001A HIP FRACTURE, RIGHT, CLOSED, INITIAL ENCOUNTER (H): ICD-10-CM

## 2025-06-15 DIAGNOSIS — S72.141A CLOSED DISPLACED INTERTROCHANTERIC FRACTURE OF RIGHT FEMUR, INITIAL ENCOUNTER (H): Primary | ICD-10-CM

## 2025-06-15 DIAGNOSIS — Z71.89 OTHER SPECIFIED COUNSELING: Chronic | ICD-10-CM

## 2025-06-15 DIAGNOSIS — F32.A DEPRESSION, UNSPECIFIED DEPRESSION TYPE: ICD-10-CM

## 2025-06-15 LAB
ABO + RH BLD: ABNORMAL
ALBUMIN UR-MCNC: NEGATIVE MG/DL
AMORPH CRY #/AREA URNS HPF: ABNORMAL /HPF
ANION GAP SERPL CALCULATED.3IONS-SCNC: 13 MMOL/L (ref 7–15)
ANTIBODY, PREVIOUSLY IDENTIFIED: NORMAL
APPEARANCE UR: ABNORMAL
APTT PPP: 28 SECONDS (ref 22–38)
ATRIAL RATE - MUSE: 81 BPM
BASOPHILS # BLD AUTO: 0 10E3/UL (ref 0–0.2)
BASOPHILS NFR BLD AUTO: 0 %
BILIRUB UR QL STRIP: NEGATIVE
BLD GP AB SCN SERPL QL: POSITIVE
BUN SERPL-MCNC: 19.9 MG/DL (ref 8–23)
CALCIUM SERPL-MCNC: 9.1 MG/DL (ref 8.8–10.4)
CHLORIDE SERPL-SCNC: 91 MMOL/L (ref 98–107)
COLOR UR AUTO: ABNORMAL
CREAT SERPL-MCNC: 0.81 MG/DL (ref 0.51–0.95)
DIASTOLIC BLOOD PRESSURE - MUSE: NORMAL MMHG
EGFRCR SERPLBLD CKD-EPI 2021: 73 ML/MIN/1.73M2
EOSINOPHIL # BLD AUTO: 0 10E3/UL (ref 0–0.7)
EOSINOPHIL NFR BLD AUTO: 0 %
ERYTHROCYTE [DISTWIDTH] IN BLOOD BY AUTOMATED COUNT: 13.2 % (ref 10–15)
GLUCOSE SERPL-MCNC: 106 MG/DL (ref 70–99)
GLUCOSE UR STRIP-MCNC: NEGATIVE MG/DL
HCO3 SERPL-SCNC: 24 MMOL/L (ref 22–29)
HCT VFR BLD AUTO: 35.1 % (ref 35–47)
HGB BLD-MCNC: 12.1 G/DL (ref 11.7–15.7)
HGB UR QL STRIP: ABNORMAL
HOLD SPECIMEN: NORMAL
IMM GRANULOCYTES # BLD: 0.2 10E3/UL
IMM GRANULOCYTES NFR BLD: 1 %
INR PPP: 1.03 (ref 0.85–1.15)
INTERPRETATION ECG - MUSE: NORMAL
KETONES UR STRIP-MCNC: 20 MG/DL
LEUKOCYTE ESTERASE UR QL STRIP: NEGATIVE
LYMPHOCYTES # BLD AUTO: 0.7 10E3/UL (ref 0.8–5.3)
LYMPHOCYTES NFR BLD AUTO: 3 %
MCH RBC QN AUTO: 29.4 PG (ref 26.5–33)
MCHC RBC AUTO-ENTMCNC: 34.5 G/DL (ref 31.5–36.5)
MCV RBC AUTO: 85 FL (ref 78–100)
MONOCYTES # BLD AUTO: 2.2 10E3/UL (ref 0–1.3)
MONOCYTES NFR BLD AUTO: 8 %
NEUTROPHILS # BLD AUTO: 25.1 10E3/UL (ref 1.6–8.3)
NEUTROPHILS NFR BLD AUTO: 89 %
NITRATE UR QL: NEGATIVE
NRBC # BLD AUTO: 0 10E3/UL
NRBC BLD AUTO-RTO: 0 /100
OSMOLALITY SERPL: 273 MMOL/KG (ref 280–301)
OSMOLALITY UR: 530 MMOL/KG (ref 100–1200)
P AXIS - MUSE: 95 DEGREES
PH UR STRIP: 7.5 [PH] (ref 5–7)
PLAT MORPH BLD: NORMAL
PLATELET # BLD AUTO: 341 10E3/UL (ref 150–450)
POTASSIUM SERPL-SCNC: 5 MMOL/L (ref 3.4–5.3)
PR INTERVAL - MUSE: 158 MS
PROTHROMBIN TIME: 13.3 SECONDS (ref 11.8–14.8)
QRS DURATION - MUSE: 86 MS
QT - MUSE: 398 MS
QTC - MUSE: 462 MS
R AXIS - MUSE: 24 DEGREES
RBC # BLD AUTO: 4.11 10E6/UL (ref 3.8–5.2)
RBC MORPH BLD: NORMAL
RBC URINE: 2 /HPF
SODIUM SERPL-SCNC: 127 MMOL/L (ref 135–145)
SODIUM SERPL-SCNC: 128 MMOL/L (ref 135–145)
SODIUM UR-SCNC: 120 MMOL/L
SP GR UR STRIP: 1.01 (ref 1–1.03)
SPECIMEN EXP DATE BLD: ABNORMAL
SPECIMEN EXP DATE BLD: NORMAL
SYSTOLIC BLOOD PRESSURE - MUSE: NORMAL MMHG
T AXIS - MUSE: 64 DEGREES
TSH SERPL DL<=0.005 MIU/L-ACNC: 18.33 UIU/ML (ref 0.3–4.2)
UROBILINOGEN UR STRIP-MCNC: NORMAL MG/DL
VENTRICULAR RATE- MUSE: 81 BPM
WBC # BLD AUTO: 28.3 10E3/UL (ref 4–11)
WBC URINE: 5 /HPF

## 2025-06-15 PROCEDURE — 86901 BLOOD TYPING SEROLOGIC RH(D): CPT | Performed by: EMERGENCY MEDICINE

## 2025-06-15 PROCEDURE — 83935 ASSAY OF URINE OSMOLALITY: CPT | Performed by: PHYSICIAN ASSISTANT

## 2025-06-15 PROCEDURE — 36415 COLL VENOUS BLD VENIPUNCTURE: CPT | Performed by: PHYSICIAN ASSISTANT

## 2025-06-15 PROCEDURE — 86922 COMPATIBILITY TEST ANTIGLOB: CPT | Performed by: EMERGENCY MEDICINE

## 2025-06-15 PROCEDURE — 81001 URINALYSIS AUTO W/SCOPE: CPT | Performed by: PHYSICIAN ASSISTANT

## 2025-06-15 PROCEDURE — 120N000001 HC R&B MED SURG/OB

## 2025-06-15 PROCEDURE — 84439 ASSAY OF FREE THYROXINE: CPT | Performed by: PHYSICIAN ASSISTANT

## 2025-06-15 PROCEDURE — 71045 X-RAY EXAM CHEST 1 VIEW: CPT

## 2025-06-15 PROCEDURE — 72125 CT NECK SPINE W/O DYE: CPT

## 2025-06-15 PROCEDURE — 93005 ELECTROCARDIOGRAM TRACING: CPT | Mod: 76

## 2025-06-15 PROCEDURE — 250N000011 HC RX IP 250 OP 636: Mod: JZ | Performed by: EMERGENCY MEDICINE

## 2025-06-15 PROCEDURE — 86900 BLOOD TYPING SEROLOGIC ABO: CPT | Performed by: EMERGENCY MEDICINE

## 2025-06-15 PROCEDURE — 83930 ASSAY OF BLOOD OSMOLALITY: CPT | Performed by: PHYSICIAN ASSISTANT

## 2025-06-15 PROCEDURE — 36415 COLL VENOUS BLD VENIPUNCTURE: CPT | Performed by: EMERGENCY MEDICINE

## 2025-06-15 PROCEDURE — 99285 EMERGENCY DEPT VISIT HI MDM: CPT | Mod: 25

## 2025-06-15 PROCEDURE — 250N000013 HC RX MED GY IP 250 OP 250 PS 637: Performed by: PHYSICIAN ASSISTANT

## 2025-06-15 PROCEDURE — 85025 COMPLETE CBC W/AUTO DIFF WBC: CPT | Performed by: EMERGENCY MEDICINE

## 2025-06-15 PROCEDURE — 70450 CT HEAD/BRAIN W/O DYE: CPT

## 2025-06-15 PROCEDURE — 96361 HYDRATE IV INFUSION ADD-ON: CPT

## 2025-06-15 PROCEDURE — 250N000011 HC RX IP 250 OP 636: Mod: JZ | Performed by: PHYSICIAN ASSISTANT

## 2025-06-15 PROCEDURE — 80048 BASIC METABOLIC PNL TOTAL CA: CPT | Performed by: EMERGENCY MEDICINE

## 2025-06-15 PROCEDURE — 96374 THER/PROPH/DIAG INJ IV PUSH: CPT

## 2025-06-15 PROCEDURE — 93005 ELECTROCARDIOGRAM TRACING: CPT

## 2025-06-15 PROCEDURE — 99223 1ST HOSP IP/OBS HIGH 75: CPT | Performed by: PHYSICIAN ASSISTANT

## 2025-06-15 PROCEDURE — 84300 ASSAY OF URINE SODIUM: CPT | Performed by: PHYSICIAN ASSISTANT

## 2025-06-15 PROCEDURE — 258N000003 HC RX IP 258 OP 636: Performed by: EMERGENCY MEDICINE

## 2025-06-15 PROCEDURE — 73502 X-RAY EXAM HIP UNI 2-3 VIEWS: CPT

## 2025-06-15 PROCEDURE — 84443 ASSAY THYROID STIM HORMONE: CPT | Performed by: PHYSICIAN ASSISTANT

## 2025-06-15 PROCEDURE — 85730 THROMBOPLASTIN TIME PARTIAL: CPT | Performed by: EMERGENCY MEDICINE

## 2025-06-15 PROCEDURE — 85610 PROTHROMBIN TIME: CPT | Performed by: EMERGENCY MEDICINE

## 2025-06-15 PROCEDURE — 96375 TX/PRO/DX INJ NEW DRUG ADDON: CPT

## 2025-06-15 PROCEDURE — 84295 ASSAY OF SERUM SODIUM: CPT | Performed by: PHYSICIAN ASSISTANT

## 2025-06-15 RX ORDER — ONDANSETRON 4 MG/1
4 TABLET, ORALLY DISINTEGRATING ORAL EVERY 6 HOURS PRN
Status: DISCONTINUED | OUTPATIENT
Start: 2025-06-15 | End: 2025-06-16

## 2025-06-15 RX ORDER — OXYCODONE HYDROCHLORIDE 5 MG/1
5 TABLET ORAL EVERY 4 HOURS PRN
Status: DISCONTINUED | OUTPATIENT
Start: 2025-06-15 | End: 2025-06-16

## 2025-06-15 RX ORDER — ESCITALOPRAM OXALATE 20 MG/1
20 TABLET ORAL DAILY
Status: ON HOLD | COMMUNITY
End: 2025-06-23

## 2025-06-15 RX ORDER — ONDANSETRON 2 MG/ML
4 INJECTION INTRAMUSCULAR; INTRAVENOUS EVERY 6 HOURS PRN
Status: DISCONTINUED | OUTPATIENT
Start: 2025-06-15 | End: 2025-06-16

## 2025-06-15 RX ORDER — HYDROMORPHONE HCL IN WATER/PF 6 MG/30 ML
0.2 PATIENT CONTROLLED ANALGESIA SYRINGE INTRAVENOUS
Status: DISCONTINUED | OUTPATIENT
Start: 2025-06-15 | End: 2025-06-16

## 2025-06-15 RX ORDER — HYDROMORPHONE HCL IN WATER/PF 6 MG/30 ML
0.4 PATIENT CONTROLLED ANALGESIA SYRINGE INTRAVENOUS
Status: DISCONTINUED | OUTPATIENT
Start: 2025-06-15 | End: 2025-06-16

## 2025-06-15 RX ORDER — LIDOCAINE 40 MG/G
CREAM TOPICAL
Status: DISCONTINUED | OUTPATIENT
Start: 2025-06-15 | End: 2025-06-22

## 2025-06-15 RX ORDER — CALCIUM CARBONATE 500 MG/1
1000 TABLET, CHEWABLE ORAL 4 TIMES DAILY PRN
Status: DISCONTINUED | OUTPATIENT
Start: 2025-06-15 | End: 2025-06-23 | Stop reason: HOSPADM

## 2025-06-15 RX ORDER — AMOXICILLIN 250 MG
1 CAPSULE ORAL 2 TIMES DAILY PRN
Status: DISCONTINUED | OUTPATIENT
Start: 2025-06-15 | End: 2025-06-22 | Stop reason: DRUGHIGH

## 2025-06-15 RX ORDER — SODIUM CHLORIDE 1 G/1
1 TABLET ORAL 2 TIMES DAILY WITH MEALS
Status: DISCONTINUED | OUTPATIENT
Start: 2025-06-15 | End: 2025-06-20

## 2025-06-15 RX ORDER — HYDROMORPHONE HYDROCHLORIDE 1 MG/ML
0.5 INJECTION, SOLUTION INTRAMUSCULAR; INTRAVENOUS; SUBCUTANEOUS
Refills: 0 | Status: DISCONTINUED | OUTPATIENT
Start: 2025-06-15 | End: 2025-06-15

## 2025-06-15 RX ORDER — HYDRALAZINE HYDROCHLORIDE 20 MG/ML
10 INJECTION INTRAMUSCULAR; INTRAVENOUS EVERY 4 HOURS PRN
Status: DISCONTINUED | OUTPATIENT
Start: 2025-06-15 | End: 2025-06-23 | Stop reason: HOSPADM

## 2025-06-15 RX ORDER — SODIUM CHLORIDE 9 MG/ML
INJECTION, SOLUTION INTRAVENOUS CONTINUOUS
Status: DISCONTINUED | OUTPATIENT
Start: 2025-06-15 | End: 2025-06-15

## 2025-06-15 RX ORDER — BISACODYL 10 MG
10 SUPPOSITORY, RECTAL RECTAL DAILY PRN
Status: DISCONTINUED | OUTPATIENT
Start: 2025-06-15 | End: 2025-06-16

## 2025-06-15 RX ORDER — ACETAMINOPHEN 325 MG/1
975 TABLET ORAL 3 TIMES DAILY
Status: DISCONTINUED | OUTPATIENT
Start: 2025-06-15 | End: 2025-06-16

## 2025-06-15 RX ORDER — AMOXICILLIN 250 MG
2 CAPSULE ORAL 2 TIMES DAILY PRN
Status: DISCONTINUED | OUTPATIENT
Start: 2025-06-15 | End: 2025-06-22 | Stop reason: DRUGHIGH

## 2025-06-15 RX ORDER — POLYETHYLENE GLYCOL 3350 17 G/17G
17 POWDER, FOR SOLUTION ORAL 2 TIMES DAILY PRN
Status: DISCONTINUED | OUTPATIENT
Start: 2025-06-15 | End: 2025-06-23 | Stop reason: HOSPADM

## 2025-06-15 RX ORDER — ESCITALOPRAM OXALATE 10 MG/1
20 TABLET ORAL DAILY
Status: DISCONTINUED | OUTPATIENT
Start: 2025-06-16 | End: 2025-06-20

## 2025-06-15 RX ORDER — PROCHLORPERAZINE MALEATE 5 MG/1
5 TABLET ORAL EVERY 6 HOURS PRN
Status: DISCONTINUED | OUTPATIENT
Start: 2025-06-15 | End: 2025-06-16

## 2025-06-15 RX ORDER — FENTANYL CITRATE 50 UG/ML
50 INJECTION, SOLUTION INTRAMUSCULAR; INTRAVENOUS
Status: DISCONTINUED | OUTPATIENT
Start: 2025-06-15 | End: 2025-06-15

## 2025-06-15 RX ORDER — LOSARTAN POTASSIUM 50 MG/1
50 TABLET ORAL DAILY
Status: DISCONTINUED | OUTPATIENT
Start: 2025-06-16 | End: 2025-06-17

## 2025-06-15 RX ADMIN — OXYCODONE HYDROCHLORIDE 5 MG: 5 TABLET ORAL at 19:35

## 2025-06-15 RX ADMIN — HYDROMORPHONE HYDROCHLORIDE 0.5 MG: 1 INJECTION, SOLUTION INTRAMUSCULAR; INTRAVENOUS; SUBCUTANEOUS at 16:14

## 2025-06-15 RX ADMIN — HYDROMORPHONE HYDROCHLORIDE 0.5 MG: 1 INJECTION, SOLUTION INTRAMUSCULAR; INTRAVENOUS; SUBCUTANEOUS at 17:01

## 2025-06-15 RX ADMIN — HYDRALAZINE HYDROCHLORIDE 10 MG: 20 INJECTION INTRAMUSCULAR; INTRAVENOUS at 18:03

## 2025-06-15 RX ADMIN — Medication 1 G: at 22:22

## 2025-06-15 RX ADMIN — SODIUM CHLORIDE 1000 ML: 0.9 INJECTION, SOLUTION INTRAVENOUS at 14:56

## 2025-06-15 RX ADMIN — ACETAMINOPHEN 975 MG: 325 TABLET, FILM COATED ORAL at 19:34

## 2025-06-15 RX ADMIN — FENTANYL CITRATE 50 MCG: 50 INJECTION, SOLUTION INTRAMUSCULAR; INTRAVENOUS at 15:13

## 2025-06-15 RX ADMIN — FENTANYL CITRATE 50 MCG: 50 INJECTION, SOLUTION INTRAMUSCULAR; INTRAVENOUS at 14:54

## 2025-06-15 ASSESSMENT — ACTIVITIES OF DAILY LIVING (ADL)
ADLS_ACUITY_SCORE: 49

## 2025-06-15 NOTE — ED NOTES
Bed: ED20  Expected date:   Expected time:   Means of arrival:   Comments:  A 522 79 F dementia fall with hip pain eta 9147

## 2025-06-15 NOTE — ED PROVIDER NOTES
Emergency Department Note      History of Present Illness   Chief Complaint   Fall    HPI   Batsheva Fuentes is a 79 year old female with a history of behavioral variant frontotemporal dementia, right leg DVT, and hypertension, who presents to the emergency department today via EMS for evaluation of a fall. The patient's  reports that Batsheva and him had just finished a walk when she tripped and suffered a fall, landing on her right hip. She endorses right hip pain, and head trauma, but denies any loss of consciousness. Batsheva denies any neck, back, chest, collar bone, shoulder, elbow, wrist, abdominal, left leg, or right ankle pain. Batsheva has not taken any pain meds today.     Independent Historian    as detailed above.    Review of External Notes   None    Past Medical History     Medical History and Problem List   Behavioral variant frontotemporal dementia  Right leg DVT  Hypertension     Medications   Ferrous Sulfate 324   hyoscyamine   levothyroxine   losartan  ondansetron   sertraline   Amlodipine   Doxylamine succinate    Surgical History   Tonsillectomy  Hysterectomy  Brain aneurysm surgery  Sinus surgery     Physical Exam     Patient Vitals for the past 24 hrs:   BP Temp Temp src Pulse Resp SpO2   06/15/25 1803 (!) 189/131 -- -- -- -- --   06/15/25 1700 (!) 204/119 -- -- 82 20 100 %   06/15/25 1610 (!) 209/117 98  F (36.7  C) Oral 90 22 100 %   06/15/25 1559 -- 98.2  F (36.8  C) Oral -- -- --   06/15/25 1510 (!) 209/119 -- -- 77 18 99 %   06/15/25 1500 (!) 199/110 -- -- 78 20 92 %   06/15/25 1436 (!) 201/112 -- -- 76 18 92 %   06/15/25 1434 (!) 201/112 -- -- 76 -- 93 %     Physical Exam  General: Dementia, appears elderly and frail. Cooperative.     In mild distress  HEENT:  Head:  Atraumatic  Ears:  External ears are normal  Mouth/Throat:  Oropharynx is without erythema or exudate and mucous membranes are dry.   Eyes:   Conjunctivae normal and EOM are normal. No scleral  icterus.  CV:  Normal rate, regular rhythm, normal heart sounds and radial pulses are 2+ and symmetric.  No murmur.  Resp:  Breath sounds are clear bilaterally    Non-labored, no retractions or accessory muscle use  GI:  Abdomen is soft, no distension, no tenderness. No rebound or guarding.  No CVA tenderness bilaterally  MS:  No edema.    Back atraumatic.    No midline cervical, thoracic, or lumbar tenderness    Right Hip:    There is decreased ROM of the hip due to pain in the proximal femur/right hip.      Flexion and Extension of the hip is abnormal, limited due to pain and concern for fracture    There is no hematoma or obvious bursitis    There is no hematoma to the thigh    Sensory and Motor exam to the thigh and distal leg are normal    The knee, shin, ankle, and foot are without pain    Normal distal pulses are detected  Skin:  Warm and dry.  No rash or lesions noted.  Neuro:   Demented, but awake and alert.  Moving all extremities (except limited movement with RLE due to concern for hip fx).  GCS: 15  Psych: Normal mood and affect.    Diagnostics     Lab Results   Labs Ordered and Resulted from Time of ED Arrival to Time of ED Departure   BASIC METABOLIC PANEL - Abnormal       Result Value    Sodium 128 (*)     Potassium 5.0      Chloride 91 (*)     Carbon Dioxide (CO2) 24      Anion Gap 13      Urea Nitrogen 19.9      Creatinine 0.81      GFR Estimate 73      Calcium 9.1      Glucose 106 (*)    INR - Normal    INR 1.03      PT 13.3     PARTIAL THROMBOPLASTIN TIME - Normal    aPTT 28     ROUTINE UA WITH MICROSCOPIC REFLEX TO CULTURE   CBC WITH PLATELETS AND DIFFERENTIAL   OSMOLALITY, RANDOM URINE   OSMOLALITY   SODIUM RANDOM URINE   TYPE AND SCREEN, ADULT    ABO/RH(D) A POS      SPECIMEN EXPIRATION DATE 6/18/2025 11:59:00 PM CDT     ABO/RH TYPE AND SCREEN     Imaging   XR Pelvis w Hip Right 1 View   Final Result   IMPRESSION: Acute mildly displaced and angulated intertrochanteric fracture of the right  femur with moderate resulting varus angulation. No additional fractures.      CT Head w/o Contrast    (Results Pending)   CT Cervical Spine w/o Contrast    (Results Pending)     ECG results from 06/15/25   EKG 12-lead, tracing only     Value    Systolic Blood Pressure     Diastolic Blood Pressure     Ventricular Rate 81    Atrial Rate 81    DE Interval 158    QRS Duration 86        QTc 462    P Axis 95    R AXIS 24    T Axis 64    Interpretation ECG      Sinus rhythm  Septal infarct , age undetermined  T wave abnormality, consider anterior ischemia  Abnormal ECG  When compared with ECG of 08-Feb-2005 11:10,  Septal infarct is now Present  Confirmed by GENERATED REPORT, COMPUTER ([id number]),  Charla Sanchez ([id number]) on 6/15/2025 5:31:15 PM       Independent Interpretation   CT Head: No intracranial hemorrhage.  X-ray right hip shows right intertrochanteric hip fracture    ED Course      Medications Administered   Medications   sodium chloride 0.9 % infusion (1,000 mLs Intravenous $New Bag 6/15/25 1456)   HYDROmorphone (PF) (DILAUDID) injection 0.5 mg (0.5 mg Intravenous $Given 6/15/25 1701)   hydrALAZINE (APRESOLINE) injection 10 mg (10 mg Intravenous $Given 6/15/25 1803)   acetaminophen (TYLENOL) tablet 975 mg (has no administration in time range)   oxyCODONE IR (ROXICODONE) half-tab 2.5 mg (has no administration in time range)   oxyCODONE (ROXICODONE) tablet 5 mg (has no administration in time range)     Procedures   Procedures     Discussion of Management   Admitting Hospitalist, TORRES Tam on behalf of Dr. Miles    ED Course   ED Course as of 06/15/25 1816   Sun Ji 15, 2025   1436 I obtained history and examined the patient as noted above.     1613 I spoke with adama Kaur PA-C - OR tomorrow.   1625 I spoke with Kay Tam PA-C on behalf of Dr. Miles.     Additional Documentation  None    Medical Decision Making / Diagnosis     CMS Diagnoses: None    MIPS   None    [facility]   Batsheva Fuentes  is a 79 year old female who presents for evaluation of right hip pain after fall at home. CMS is intact distally in the extremity.  Xrays reveal a fracture of the hip that will need orthopedic consultation and likely surgery.  Given the fall patient also had CT imaging of the head and cervical spine performed but results are pending upon admission to hospital.  Patient was not complaining of chest pain or abdominal pain.  Blood work pertinent for mild hyponatremia.   Will admit to medicine (SEE Tam on behalf of Dr. Miles) for further cares and ortho consultation.  Pain control achieved.  Pending CT results of head/C spine and while awaiting admission to hospital, care signed out to my colleague Dr. Zarco.        Disposition   The patient was admitted to the hospital.     Diagnosis     ICD-10-CM    1. Closed displaced intertrochanteric fracture of right femur, initial encounter (H)  S72.141A       2. Hip fracture, right, closed, initial encounter (H)  S72.001A            Discharge Medications   New Prescriptions    No medications on file     Scribe Disclosure:  I, Lasha Leroy, am serving as a scribe at 2:40 PM on 6/15/2025 to document services personally performed by Trever Munoz MD based on my observations and the provider's statements to me.        Trever Munoz MD  06/15/25 5082

## 2025-06-15 NOTE — H&P
[facility]    History and Physical - Hospitalist Service       Date of Admission:  6/15/2025    Assessment & Plan   Batsheva Fuentes is a 79 year old female with below PMHx admitted on 6/15/2025 after sustaining a mechanical fall at home with subsequent R femur fracture. Admitted for Orthopedic surgery. Of note, pt currently enrolled in hospice care with [facility] for her hx of frontotemporal dementia.     Mechanical fall with R femur fracture.   *XR shows acute mildly displaced and angulated intertrochanteric fracture of the right femur with moderate resulting varus angulation.   *Received 1 L bolus, fentanyl 50 mcg X2, IV dilaudid 0.5 mg X2 in the ED with minimal relief in pain  - Admit to inpatient status   - CT head, cervical spine and UA ordered by ER physician, not yet completed   - Ortho consulted, made aware in the ED. Plan for OR 6/16   - Analgesic regimen with tylenol 975 mg TID, oxycodone 2.5-5 mg q4 hrs PRN, IV dilaudid 0.2-0.4 mg q2 hrs PRN, ice/heat   - Bowel regimen while on narcotics   - Bedrest until Ortho surgery   - Regular diet, NPO at midnight     Pre-op evaluation: RCRI estimated risk of adverse outcome with non-cardiac surgery is low risk. BMP with mild hyponatremia, otherwise unremarkable. EKG with NSR, some TWI in V1-3. Last echo from 2004 unremarkable. No hx of adverse effect from anesthesia. No personal or family hx of VTE. No family hx of sudden death following surgery   - Await CBC, CT head and cervical spine, if unremarkable, OK to proceed with Ortho surgery without further medical optimization.     Hyponatremia: Sodium 128 on admission. S/P 1 L IVF bolus in the ED. No hx of hyponatremia noted. Good appetite reported by pt's .   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 Rouse of Neurology 4/10/25 and Dr. Collado of Psychiatry  - Continue PTA Lexapro 20 mg po every day     Hypertension: Uncontrolled on admission in the setting of pain.   - Hold Losartan 50 mg po every day in anticipation of surgery, last dose 6/15 AM   - PRN hydralazine for SBP >180     Chronic, stable medical conditions   Diverticulitis s/p colectomy   Cerebral aneurysm, SAH  Polymyalgia rheumatica   Anxiety   Depression   Tardive akathisia, improved with Risperdone    Hypothyroidism: Continue Synthroid         Diet:  Regular diet, NPO at midnight   DVT Prophylaxis: Pneumatic Compression Devices, Ambulate every shift, and defer to Ortho post-op   Izaguirre Catheter: Not present  Lines: None     Cardiac Monitoring: None  Code Status:  DNR/DNI     Clinically Significant Risk Factors Present on Admission         # Hyponatremia: Lowest Na = 128 mmol/L in last 2 days, will monitor as appropriate  # Hypochloremia: Lowest Cl = 91 mmol/L in last 2 days, will monitor as appropriate          # Hypertension: Noted on problem list                      Disposition Plan     Medically Ready for Discharge: Anticipated in 2-4 Days         The patient's care was discussed with the Attending Physician, Dr. Miles, Bedside Nurse, Patient, and Patient's Family.    Kay Tam PA-C  Hospitalist Service  [facility]  Securely message with [employer] (more info)  Text page via [facility] Paging/Directory     ______________________________________________________________________    Chief Complaint   Fall    History is obtained from the patient's  given pt hx of dementia. Chart also extensively reviewed.     History of Present Illness   Batsheva Fuentes is a 79 year old female with below PMHx admitted on 6/15/2025 after sustaining a mechanical fall at home with subsequent R femur fracture. Admitted for Orthopedic surgery. Of note, pt currently enrolled in hospice care with  [facility] for her hx of frontotemporal dementia.     Seen by Dr. Trever Munoz in the ED. XR shows acute mildly displaced and angulated intertrochanteric fracture of the right femur with moderate resulting varus angulation. Received 1 L bolus, fentanyl 50 mcg X2, IV dilaudid 0.5 mg X1 in the ED. Ortho PAS[facility]rina[facility] made aware of case.     On my interview, pts  describes a mechanical fall to the hard ground on day of admission with subsequent R hip pain. Hx of minor falls related to missteps. No recent described cardiac sx or illness.      In regards to pre-op assessment. RCRI estimated risk of adverse outcome with non-cardiac surgery is low risk. BMP with mild hyponatremia, otherwise unremarkable. EKG with NSR, some TWI in V1-3. Last echo from 2004 unremarkable. No hx of adverse effect from anesthesia. No personal or family hx of VTE. 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  Compulsion/repetitive behavior  Hypertension  Diverticulitis s/p colectomy   Cerebral aneurysm, SAH  Polymyalgia rheumatica   Anxiety   Depression   Tardive akathisia, improved with Risperdone      Past Surgical History   Past Surgical History:   Procedure Laterality Date    ENT SURGERY      GYN SURGERY      IR CAROTID ANGIOGRAM  2/18/2005    IR CAROTID ANGIOGRAM  2/21/2005    IR MISCELLANEOUS PROCEDURE  2/18/2005    IR MISCELLANEOUS PROCEDURE  2/18/2005    IR MISCELLANEOUS PROCEDURE  2/21/2005    IR MISCELLANEOUS PROCEDURE  2/21/2005    IR MISCELLANEOUS PROCEDURE  2/21/2005    IR MISCELLANEOUS PROCEDURE  3/8/2005    IR MISCELLANEOUS PROCEDURE  4/12/2006       Prior to Admission Medications   Prior to Admission Medications   Prescriptions Last Dose Informant Patient Reported? Taking?   UNKNOWN TO PATIENT 6/14/2025 Bedtime  Yes Yes   Sig: Take 1 tablet by mouth at bedtime. OTC \"Sleep Aid\"   escitalopram (LEXAPRO) 20 MG tablet " 6/15/2025 Morning  Yes Yes   Sig: Take 20 mg by mouth daily.   levothyroxine (SYNTHROID/LEVOTHROID) 75 MCG tablet 6/15/2025 Morning  Yes Yes   Sig: Take 37.5 mcg by mouth daily.   losartan (COZAAR) 100 MG tablet 6/15/2025 Morning  Yes Yes   Sig: Take 50 mg by mouth daily.      Facility-Administered Medications: None        Review of Systems    The 10 point Review of Systems is negative other than noted in the HPI    Social History   I have reviewed this patient's social history and updated it with pertinent information if needed.  Social History     Tobacco Use    Smoking status: Never    Smokeless tobacco: Never   Substance Use Topics    Alcohol use: Never    Drug use: Never     Family History   Reviewed and non-contributory to current chief complaint.       Allergies   No Known Allergies     Physical Exam   Vital Signs: Temp: 98.2  F (36.8  C) Temp src: Oral BP: (!) 199/110 Pulse: 78   Resp: 20 SpO2: 92 %      Weight: 0 lbs 0 oz    CONSTITUTIONAL: Pt laying in bed, dressed in hospital garb. Appears uncomfortable, in pain. Cooperative with interview. Accompanied by , Onesimo, at bedside.   HEENT: Normocephalic, atraumatic.   CARDIOVASCULAR: RRR, no murmurs, rubs, or extra heart sounds appreciated. Pulses +2/4 and regular in upper and lower extremities, bilaterally.   RESPIRATORY: No increased work of breathing.  CTA, bilat; no wheezes, rales, or rhonchi appreciated.  GASTROINTESTINAL:  Abdomen soft, non-distended. BS auscultated in all four quadrants. Negative for tenderness to palpation.    MUSCULOSKELETAL: R hip TTP.   HEMATOLOGIC/LYMPHATIC/IMMUNOLOGIC: Negative for lower extremity edema, bilaterally.  NEUROLOGIC: Alert and oriented to self, alert. Participates in some parts of history and physical. No focal neuro deficits.   SKIN: Warm, dry, intact.   Medical Decision Making       75 MINUTES SPENT BY ME on the date of service doing chart review, history, exam, documentation & further activities per the  note.      Data     I have personally reviewed the following data over the past 24 hrs:    N/A  \   N/A   / N/A     128 (L) 91 (L) 19.9 /  106 (H)   5.0 24 0.81 \       Imaging results reviewed over the past 24 hrs:   Recent Results (from the past 24 hours)   XR Pelvis w Hip Right 1 View    Narrative    EXAM: XR PELVIS AND HIP RIGHT 1 VIEW  LOCATION: [facility]  DATE: 6/15/2025    INDICATION: Right hip pain after fall, concern for fx  COMPARISON: None.      Impression    IMPRESSION: Acute mildly displaced and angulated intertrochanteric fracture of the right femur with moderate resulting varus angulation. No additional fractures.

## 2025-06-15 NOTE — PHARMACY-ADMISSION MEDICATION HISTORY
"Pharmacist Admission Medication History    Admission medication history is complete. The information provided in this note is only as accurate as the sources available at the time of the update.    Information Source(s): Family member and CareEverywhere/SureScripts via in-person with     Pertinent Information:   -Lately she has been taking OTC \"Sleep Aid\" at bedtime.  thought maybe like Benadryl.  -Verified that she is not taking amlodipine, gabapentin or risperidone    Changes made to PTA medication list:  Added: escitalopram, sleep aid  Deleted: ferrous sulfate, hyoscyamine, ondansetron, sertraline  Changed: levothyroxine 75mcg > 37.5mcg, losartan 100mg > 50mg    Allergies reviewed with patient and updates made in EHR: yes    Medication History Completed By: Kathie Goodwin RPH 6/15/2025 3:35 PM    PTA Med List   Medication Sig Last Dose/Taking    escitalopram (LEXAPRO) 20 MG tablet Take 20 mg by mouth daily. 6/15/2025 Morning    levothyroxine (SYNTHROID/LEVOTHROID) 75 MCG tablet Take 37.5 mcg by mouth daily. 6/15/2025 Morning    losartan (COZAAR) 100 MG tablet Take 50 mg by mouth daily. 6/15/2025 Morning    UNKNOWN TO PATIENT Take 1 tablet by mouth at bedtime. OTC \"Sleep Aid\" 6/14/2025 Bedtime      " Hemostasis: Electrocautery Biopsy Type: H and E Destruction After The Procedure: No Billing Type: Third-Party Bill Silver Nitrate Text: The wound bed was treated with silver nitrate after the biopsy was performed. Was A Bandage Applied: Yes Type Of Destruction Used: Curettage Anesthesia Type: 1% lidocaine with epinephrine and a 1:10 solution of 8.4% sodium bicarbonate Electrodesiccation And Curettage Text: The wound bed was treated with electrodesiccation and curettage after the biopsy was performed. Information: Selecting Yes will display possible errors in your note based on the variables you have selected. This validation is only offered as a suggestion for you. PLEASE NOTE THAT THE VALIDATION TEXT WILL BE REMOVED WHEN YOU FINALIZE YOUR NOTE. IF YOU WANT TO FAX A PRELIMINARY NOTE YOU WILL NEED TO TOGGLE THIS TO 'NO' IF YOU DO NOT WANT IT IN YOUR FAXED NOTE. Biopsy Method: Dermablade Wound Care: Petrolatum Electrodesiccation Text: The wound bed was treated with electrodesiccation after the biopsy was performed. Notification Instructions: Patient will be notified of biopsy results. However, patient instructed to call the office if not contacted within 2 weeks. X Size Of Lesion In Cm: 0.6 Path Notes (To The Dermatopathologist): Melanoma Consent: Written consent was obtained and risks were reviewed including but not limited to scarring, infection, bleeding, scabbing, incomplete removal, nerve damage and allergy to anesthesia. Cryotherapy Text: The wound bed was treated with cryotherapy after the biopsy was performed. Anesthesia Volume In Cc (Will Not Render If 0): 0.5 Post-Care Instructions: I reviewed with the patient in detail post-care instructions. Patient is to keep the biopsy site dry overnight, and then apply bacitracin twice daily until healed. Patient may apply hydrogen peroxide soaks to remove any crusting. Curettage Text: The wound bed was treated with curettage after the biopsy was performed. Dressing: bandage Size Of Lesion In Cm: 0.8 Detail Level: Detailed Depth Of Biopsy: dermis Additional Anesthesia Volume In Cc (Will Not Render If 0): 0

## 2025-06-15 NOTE — ED TRIAGE NOTES
Pt presents from home via ems where she resides with her . Pt is on hospice with dementia and spends most days walking around house, today pt fell, severe right hip pain noted, c collar placed by ems prior to arrival. Ice applied to right leg upon arrival to ED     Triage Assessment (Adult)       Row Name 06/15/25 1431          Triage Assessment    Airway WDL WDL        Respiratory WDL    Respiratory WDL all     Rhythm/Pattern, Respiratory unlabored;pattern regular;depth regular;no shortness of breath reported        Cardiac WDL    Cardiac WDL --  no chest pain reported        Cognitive/Neuro/Behavioral WDL    Cognitive/Neuro/Behavioral WDL all        [place] Coma Scale    Best Eye Response 4-->(E4) spontaneous     Best Motor Response 6-->(M6) obeys commands     Best Verbal Response 5-->(V5) oriented  dementia, Alert to self     [place] Coma Scale Score 15

## 2025-06-16 ENCOUNTER — APPOINTMENT (OUTPATIENT)
Dept: GENERAL RADIOLOGY | Facility: CLINIC | Age: 80
End: 2025-06-16
Attending: ORTHOPAEDIC SURGERY
Payer: COMMERCIAL

## 2025-06-16 ENCOUNTER — ANESTHESIA (OUTPATIENT)
Dept: SURGERY | Facility: CLINIC | Age: 80
End: 2025-06-16
Payer: COMMERCIAL

## 2025-06-16 ENCOUNTER — ANESTHESIA EVENT (OUTPATIENT)
Dept: SURGERY | Facility: CLINIC | Age: 80
End: 2025-06-16
Payer: COMMERCIAL

## 2025-06-16 LAB
ALBUMIN SERPL BCG-MCNC: 3.7 G/DL (ref 3.5–5.2)
ALP SERPL-CCNC: 58 U/L (ref 40–150)
ALT SERPL W P-5'-P-CCNC: 20 U/L (ref 0–50)
ANION GAP SERPL CALCULATED.3IONS-SCNC: 10 MMOL/L (ref 7–15)
AST SERPL W P-5'-P-CCNC: 37 U/L (ref 0–45)
BASOPHILS # BLD AUTO: 0 10E3/UL (ref 0–0.2)
BASOPHILS NFR BLD AUTO: 0 %
BILIRUB DIRECT SERPL-MCNC: 0.33 MG/DL (ref 0–0.3)
BILIRUB SERPL-MCNC: 1.4 MG/DL
BLD PROD TYP BPU: NORMAL
BLD PROD TYP BPU: NORMAL
BLOOD COMPONENT TYPE: NORMAL
BLOOD COMPONENT TYPE: NORMAL
BUN SERPL-MCNC: 17.8 MG/DL (ref 8–23)
CALCIUM SERPL-MCNC: 8.8 MG/DL (ref 8.8–10.4)
CHLORIDE SERPL-SCNC: 93 MMOL/L (ref 98–107)
CODING SYSTEM: NORMAL
CODING SYSTEM: NORMAL
CREAT SERPL-MCNC: 0.71 MG/DL (ref 0.51–0.95)
CROSSMATCH: NORMAL
CROSSMATCH: NORMAL
EGFRCR SERPLBLD CKD-EPI 2021: 85 ML/MIN/1.73M2
EOSINOPHIL # BLD AUTO: 0 10E3/UL (ref 0–0.7)
EOSINOPHIL NFR BLD AUTO: 0 %
ERYTHROCYTE [DISTWIDTH] IN BLOOD BY AUTOMATED COUNT: 13.3 % (ref 10–15)
EST. AVERAGE GLUCOSE BLD GHB EST-MCNC: 103 MG/DL
GLUCOSE SERPL-MCNC: 128 MG/DL (ref 70–99)
HBA1C MFR BLD: 5.2 %
HCO3 SERPL-SCNC: 23 MMOL/L (ref 22–29)
HCT VFR BLD AUTO: 30.5 % (ref 35–47)
HGB BLD-MCNC: 11.1 G/DL (ref 11.7–15.7)
IMM GRANULOCYTES # BLD: 0.1 10E3/UL
IMM GRANULOCYTES NFR BLD: 1 %
LYMPHOCYTES # BLD AUTO: 1.7 10E3/UL (ref 0.8–5.3)
LYMPHOCYTES NFR BLD AUTO: 13 %
MAGNESIUM SERPL-MCNC: 1.9 MG/DL (ref 1.7–2.3)
MCH RBC QN AUTO: 29.9 PG (ref 26.5–33)
MCHC RBC AUTO-ENTMCNC: 36.4 G/DL (ref 31.5–36.5)
MCV RBC AUTO: 82 FL (ref 78–100)
MONOCYTES # BLD AUTO: 1.4 10E3/UL (ref 0–1.3)
MONOCYTES NFR BLD AUTO: 10 %
NEUTROPHILS # BLD AUTO: 10.6 10E3/UL (ref 1.6–8.3)
NEUTROPHILS NFR BLD AUTO: 77 %
NRBC # BLD AUTO: 0 10E3/UL
NRBC BLD AUTO-RTO: 0 /100
NT-PROBNP SERPL-MCNC: 1857 PG/ML (ref 0–624)
PLATELET # BLD AUTO: 282 10E3/UL (ref 150–450)
POTASSIUM SERPL-SCNC: 4.2 MMOL/L (ref 3.4–5.3)
PROCALCITONIN SERPL IA-MCNC: 0.58 NG/ML
PROT SERPL-MCNC: 5.9 G/DL (ref 6.4–8.3)
RBC # BLD AUTO: 3.71 10E6/UL (ref 3.8–5.2)
SODIUM SERPL-SCNC: 126 MMOL/L (ref 135–145)
SODIUM SERPL-SCNC: 128 MMOL/L (ref 135–145)
SODIUM SERPL-SCNC: 128 MMOL/L (ref 135–145)
SODIUM SERPL-SCNC: 130 MMOL/L (ref 135–145)
SODIUM SERPL-SCNC: 130 MMOL/L (ref 135–145)
T4 FREE SERPL-MCNC: 1.59 NG/DL (ref 0.9–1.7)
UNIT ABO/RH: NORMAL
UNIT ABO/RH: NORMAL
UNIT NUMBER: NORMAL
UNIT NUMBER: NORMAL
UNIT STATUS: NORMAL
UNIT STATUS: NORMAL
UNIT TYPE ISBT: 6200
UNIT TYPE ISBT: 6200
WBC # BLD AUTO: 13.9 10E3/UL (ref 4–11)

## 2025-06-16 PROCEDURE — 120N000001 HC R&B MED SURG/OB

## 2025-06-16 PROCEDURE — 258N000003 HC RX IP 258 OP 636

## 2025-06-16 PROCEDURE — 36415 COLL VENOUS BLD VENIPUNCTURE: CPT | Performed by: PHYSICIAN ASSISTANT

## 2025-06-16 PROCEDURE — 250N000011 HC RX IP 250 OP 636: Mod: JZ | Performed by: ANESTHESIOLOGY

## 2025-06-16 PROCEDURE — 85004 AUTOMATED DIFF WBC COUNT: CPT | Performed by: PHYSICIAN ASSISTANT

## 2025-06-16 PROCEDURE — 250N000009 HC RX 250: Performed by: INTERNAL MEDICINE

## 2025-06-16 PROCEDURE — 250N000011 HC RX IP 250 OP 636: Performed by: NURSE ANESTHETIST, CERTIFIED REGISTERED

## 2025-06-16 PROCEDURE — 258N000003 HC RX IP 258 OP 636: Performed by: ANESTHESIOLOGY

## 2025-06-16 PROCEDURE — 80076 HEPATIC FUNCTION PANEL: CPT | Performed by: PHYSICIAN ASSISTANT

## 2025-06-16 PROCEDURE — 83036 HEMOGLOBIN GLYCOSYLATED A1C: CPT | Performed by: PHYSICIAN ASSISTANT

## 2025-06-16 PROCEDURE — 250N000009 HC RX 250: Performed by: NURSE ANESTHETIST, CERTIFIED REGISTERED

## 2025-06-16 PROCEDURE — 250N000013 HC RX MED GY IP 250 OP 250 PS 637: Performed by: PHYSICIAN ASSISTANT

## 2025-06-16 PROCEDURE — 250N000011 HC RX IP 250 OP 636: Performed by: PHYSICIAN ASSISTANT

## 2025-06-16 PROCEDURE — 84295 ASSAY OF SERUM SODIUM: CPT | Performed by: PHYSICIAN ASSISTANT

## 2025-06-16 PROCEDURE — 258N000003 HC RX IP 258 OP 636: Performed by: NURSE ANESTHETIST, CERTIFIED REGISTERED

## 2025-06-16 PROCEDURE — 999N000179 XR SURGERY CARM FLUORO LESS THAN 5 MIN W STILLS

## 2025-06-16 PROCEDURE — 999N000141 HC STATISTIC PRE-PROCEDURE NURSING ASSESSMENT: Performed by: ORTHOPAEDIC SURGERY

## 2025-06-16 PROCEDURE — 250N000011 HC RX IP 250 OP 636: Mod: JZ

## 2025-06-16 PROCEDURE — C1713 ANCHOR/SCREW BN/BN,TIS/BN: HCPCS | Performed by: ORTHOPAEDIC SURGERY

## 2025-06-16 PROCEDURE — 85025 COMPLETE CBC W/AUTO DIFF WBC: CPT | Performed by: PHYSICIAN ASSISTANT

## 2025-06-16 PROCEDURE — 84295 ASSAY OF SERUM SODIUM: CPT | Performed by: INTERNAL MEDICINE

## 2025-06-16 PROCEDURE — 84145 PROCALCITONIN (PCT): CPT | Performed by: PHYSICIAN ASSISTANT

## 2025-06-16 PROCEDURE — 250N000013 HC RX MED GY IP 250 OP 250 PS 637

## 2025-06-16 PROCEDURE — 370N000017 HC ANESTHESIA TECHNICAL FEE, PER MIN: Performed by: ORTHOPAEDIC SURGERY

## 2025-06-16 PROCEDURE — 360N000084 HC SURGERY LEVEL 4 W/ FLUORO, PER MIN: Performed by: ORTHOPAEDIC SURGERY

## 2025-06-16 PROCEDURE — 250N000011 HC RX IP 250 OP 636: Mod: JZ | Performed by: PHYSICIAN ASSISTANT

## 2025-06-16 PROCEDURE — 83735 ASSAY OF MAGNESIUM: CPT | Performed by: PHYSICIAN ASSISTANT

## 2025-06-16 PROCEDURE — 250N000025 HC SEVOFLURANE, PER MIN: Performed by: ORTHOPAEDIC SURGERY

## 2025-06-16 PROCEDURE — 99233 SBSQ HOSP IP/OBS HIGH 50: CPT | Performed by: PHYSICIAN ASSISTANT

## 2025-06-16 PROCEDURE — 250N000009 HC RX 250: Performed by: PHYSICIAN ASSISTANT

## 2025-06-16 PROCEDURE — 80048 BASIC METABOLIC PNL TOTAL CA: CPT | Performed by: PHYSICIAN ASSISTANT

## 2025-06-16 PROCEDURE — 83880 ASSAY OF NATRIURETIC PEPTIDE: CPT | Performed by: PHYSICIAN ASSISTANT

## 2025-06-16 PROCEDURE — 99223 1ST HOSP IP/OBS HIGH 75: CPT | Performed by: STUDENT IN AN ORGANIZED HEALTH CARE EDUCATION/TRAINING PROGRAM

## 2025-06-16 PROCEDURE — 272N000001 HC OR GENERAL SUPPLY STERILE: Performed by: ORTHOPAEDIC SURGERY

## 2025-06-16 PROCEDURE — 0QS636Z REPOSITION RIGHT UPPER FEMUR WITH INTRAMEDULLARY INTERNAL FIXATION DEVICE, PERCUTANEOUS APPROACH: ICD-10-PCS | Performed by: ORTHOPAEDIC SURGERY

## 2025-06-16 PROCEDURE — 250N000009 HC RX 250: Performed by: ORTHOPAEDIC SURGERY

## 2025-06-16 PROCEDURE — 710N000009 HC RECOVERY PHASE 1, LEVEL 1, PER MIN: Performed by: ORTHOPAEDIC SURGERY

## 2025-06-16 DEVICE — LOCKING SCREW
Type: IMPLANTABLE DEVICE | Site: HIP | Status: FUNCTIONAL
Brand: T2 ALPHA

## 2025-06-16 DEVICE — PRECISION PIN TAPERED
Type: IMPLANTABLE DEVICE | Site: HIP | Status: FUNCTIONAL
Brand: GAMMA

## 2025-06-16 DEVICE — LAG SCREW
Type: IMPLANTABLE DEVICE | Site: HIP | Status: FUNCTIONAL
Brand: GAMMA

## 2025-06-16 DEVICE — K-WIRE, STERILE: Type: IMPLANTABLE DEVICE | Site: HIP | Status: FUNCTIONAL

## 2025-06-16 DEVICE — IMP WIRE KIRSCHNER 3.2X450MM 1210-6450S: Type: IMPLANTABLE DEVICE | Site: HIP | Status: FUNCTIONAL

## 2025-06-16 DEVICE — TROCHANTERIC NAIL
Type: IMPLANTABLE DEVICE | Site: HIP | Status: FUNCTIONAL
Brand: GAMMA

## 2025-06-16 RX ORDER — DEXAMETHASONE SODIUM PHOSPHATE 4 MG/ML
4 INJECTION, SOLUTION INTRA-ARTICULAR; INTRALESIONAL; INTRAMUSCULAR; INTRAVENOUS; SOFT TISSUE
Status: DISCONTINUED | OUTPATIENT
Start: 2025-06-16 | End: 2025-06-16 | Stop reason: HOSPADM

## 2025-06-16 RX ORDER — BUPIVACAINE HCL/EPINEPHRINE 0.25-.0005
VIAL (ML) INJECTION PRN
Status: DISCONTINUED | OUTPATIENT
Start: 2025-06-16 | End: 2025-06-16 | Stop reason: HOSPADM

## 2025-06-16 RX ORDER — ASPIRIN 81 MG/1
81 TABLET ORAL 2 TIMES DAILY
Status: DISCONTINUED | OUTPATIENT
Start: 2025-06-16 | End: 2025-06-23 | Stop reason: HOSPADM

## 2025-06-16 RX ORDER — CEFAZOLIN SODIUM 1 G/3ML
1 INJECTION, POWDER, FOR SOLUTION INTRAMUSCULAR; INTRAVENOUS EVERY 8 HOURS
Status: COMPLETED | OUTPATIENT
Start: 2025-06-17 | End: 2025-06-17

## 2025-06-16 RX ORDER — PROCHLORPERAZINE MALEATE 5 MG/1
5 TABLET ORAL EVERY 6 HOURS PRN
Status: DISCONTINUED | OUTPATIENT
Start: 2025-06-16 | End: 2025-06-23 | Stop reason: HOSPADM

## 2025-06-16 RX ORDER — ONDANSETRON 4 MG/1
4 TABLET, ORALLY DISINTEGRATING ORAL EVERY 6 HOURS PRN
Status: DISCONTINUED | OUTPATIENT
Start: 2025-06-16 | End: 2025-06-23 | Stop reason: HOSPADM

## 2025-06-16 RX ORDER — HYDROMORPHONE HCL IN WATER/PF 6 MG/30 ML
0.2 PATIENT CONTROLLED ANALGESIA SYRINGE INTRAVENOUS EVERY 5 MIN PRN
Status: DISCONTINUED | OUTPATIENT
Start: 2025-06-16 | End: 2025-06-16

## 2025-06-16 RX ORDER — NALOXONE HYDROCHLORIDE 0.4 MG/ML
0.4 INJECTION, SOLUTION INTRAMUSCULAR; INTRAVENOUS; SUBCUTANEOUS
Status: DISCONTINUED | OUTPATIENT
Start: 2025-06-16 | End: 2025-06-23 | Stop reason: HOSPADM

## 2025-06-16 RX ORDER — HYDROMORPHONE HCL IN WATER/PF 6 MG/30 ML
0.4 PATIENT CONTROLLED ANALGESIA SYRINGE INTRAVENOUS EVERY 5 MIN PRN
Status: DISCONTINUED | OUTPATIENT
Start: 2025-06-16 | End: 2025-06-16

## 2025-06-16 RX ORDER — ACETAMINOPHEN 325 MG/1
975 TABLET ORAL EVERY 8 HOURS
Status: COMPLETED | OUTPATIENT
Start: 2025-06-16 | End: 2025-06-19

## 2025-06-16 RX ORDER — FENTANYL CITRATE 50 UG/ML
50 INJECTION, SOLUTION INTRAMUSCULAR; INTRAVENOUS EVERY 5 MIN PRN
Status: DISCONTINUED | OUTPATIENT
Start: 2025-06-16 | End: 2025-06-16 | Stop reason: HOSPADM

## 2025-06-16 RX ORDER — SODIUM CHLORIDE, SODIUM LACTATE, POTASSIUM CHLORIDE, CALCIUM CHLORIDE 600; 310; 30; 20 MG/100ML; MG/100ML; MG/100ML; MG/100ML
INJECTION, SOLUTION INTRAVENOUS CONTINUOUS
Status: DISCONTINUED | OUTPATIENT
Start: 2025-06-16 | End: 2025-06-16 | Stop reason: HOSPADM

## 2025-06-16 RX ORDER — LIDOCAINE HYDROCHLORIDE 20 MG/ML
INJECTION, SOLUTION INFILTRATION; PERINEURAL PRN
Status: DISCONTINUED | OUTPATIENT
Start: 2025-06-16 | End: 2025-06-16

## 2025-06-16 RX ORDER — ONDANSETRON 2 MG/ML
4 INJECTION INTRAMUSCULAR; INTRAVENOUS EVERY 6 HOURS PRN
Status: DISCONTINUED | OUTPATIENT
Start: 2025-06-16 | End: 2025-06-23 | Stop reason: HOSPADM

## 2025-06-16 RX ORDER — PROPOFOL 10 MG/ML
INJECTION, EMULSION INTRAVENOUS PRN
Status: DISCONTINUED | OUTPATIENT
Start: 2025-06-16 | End: 2025-06-16

## 2025-06-16 RX ORDER — POLYETHYLENE GLYCOL 3350 17 G/17G
17 POWDER, FOR SOLUTION ORAL DAILY
Status: DISCONTINUED | OUTPATIENT
Start: 2025-06-17 | End: 2025-06-18

## 2025-06-16 RX ORDER — CEFAZOLIN SODIUM/WATER 2 G/20 ML
2 SYRINGE (ML) INTRAVENOUS
Status: COMPLETED | OUTPATIENT
Start: 2025-06-16 | End: 2025-06-16

## 2025-06-16 RX ORDER — FAMOTIDINE 20 MG/1
20 TABLET, FILM COATED ORAL 2 TIMES DAILY
Status: DISCONTINUED | OUTPATIENT
Start: 2025-06-16 | End: 2025-06-17

## 2025-06-16 RX ORDER — HYDROXYZINE HYDROCHLORIDE 10 MG/1
10 TABLET, FILM COATED ORAL EVERY 6 HOURS PRN
Status: DISCONTINUED | OUTPATIENT
Start: 2025-06-16 | End: 2025-06-23 | Stop reason: HOSPADM

## 2025-06-16 RX ORDER — CEFAZOLIN SODIUM/WATER 2 G/20 ML
2 SYRINGE (ML) INTRAVENOUS SEE ADMIN INSTRUCTIONS
Status: DISCONTINUED | OUTPATIENT
Start: 2025-06-16 | End: 2025-06-16 | Stop reason: HOSPADM

## 2025-06-16 RX ORDER — AMOXICILLIN 250 MG
1 CAPSULE ORAL 2 TIMES DAILY
Status: DISCONTINUED | OUTPATIENT
Start: 2025-06-16 | End: 2025-06-18

## 2025-06-16 RX ORDER — ONDANSETRON 2 MG/ML
4 INJECTION INTRAMUSCULAR; INTRAVENOUS EVERY 30 MIN PRN
Status: DISCONTINUED | OUTPATIENT
Start: 2025-06-16 | End: 2025-06-16 | Stop reason: HOSPADM

## 2025-06-16 RX ORDER — TRANEXAMIC ACID 10 MG/ML
1 INJECTION, SOLUTION INTRAVENOUS ONCE
Status: DISCONTINUED | OUTPATIENT
Start: 2025-06-16 | End: 2025-06-16 | Stop reason: HOSPADM

## 2025-06-16 RX ORDER — NALOXONE HYDROCHLORIDE 0.4 MG/ML
0.2 INJECTION, SOLUTION INTRAMUSCULAR; INTRAVENOUS; SUBCUTANEOUS
Status: DISCONTINUED | OUTPATIENT
Start: 2025-06-16 | End: 2025-06-23 | Stop reason: HOSPADM

## 2025-06-16 RX ORDER — BISACODYL 10 MG
10 SUPPOSITORY, RECTAL RECTAL DAILY PRN
Status: DISCONTINUED | OUTPATIENT
Start: 2025-06-16 | End: 2025-06-23 | Stop reason: HOSPADM

## 2025-06-16 RX ORDER — LIDOCAINE 40 MG/G
CREAM TOPICAL
Status: DISCONTINUED | OUTPATIENT
Start: 2025-06-16 | End: 2025-06-16 | Stop reason: HOSPADM

## 2025-06-16 RX ORDER — HYDROMORPHONE HCL IN WATER/PF 6 MG/30 ML
0.2 PATIENT CONTROLLED ANALGESIA SYRINGE INTRAVENOUS EVERY 4 HOURS PRN
Status: DISCONTINUED | OUTPATIENT
Start: 2025-06-16 | End: 2025-06-23 | Stop reason: HOSPADM

## 2025-06-16 RX ORDER — LIDOCAINE 40 MG/G
CREAM TOPICAL
Status: DISCONTINUED | OUTPATIENT
Start: 2025-06-16 | End: 2025-06-23 | Stop reason: HOSPADM

## 2025-06-16 RX ORDER — FENTANYL CITRATE 50 UG/ML
INJECTION, SOLUTION INTRAMUSCULAR; INTRAVENOUS PRN
Status: DISCONTINUED | OUTPATIENT
Start: 2025-06-16 | End: 2025-06-16

## 2025-06-16 RX ORDER — OXYCODONE HYDROCHLORIDE 5 MG/1
5 TABLET ORAL EVERY 4 HOURS PRN
Status: DISCONTINUED | OUTPATIENT
Start: 2025-06-16 | End: 2025-06-23 | Stop reason: HOSPADM

## 2025-06-16 RX ORDER — NALOXONE HYDROCHLORIDE 0.4 MG/ML
0.1 INJECTION, SOLUTION INTRAMUSCULAR; INTRAVENOUS; SUBCUTANEOUS
Status: DISCONTINUED | OUTPATIENT
Start: 2025-06-16 | End: 2025-06-16 | Stop reason: HOSPADM

## 2025-06-16 RX ORDER — HYDROMORPHONE HCL IN WATER/PF 6 MG/30 ML
0.1 PATIENT CONTROLLED ANALGESIA SYRINGE INTRAVENOUS EVERY 4 HOURS PRN
Status: DISCONTINUED | OUTPATIENT
Start: 2025-06-16 | End: 2025-06-23 | Stop reason: HOSPADM

## 2025-06-16 RX ORDER — ONDANSETRON 2 MG/ML
INJECTION INTRAMUSCULAR; INTRAVENOUS PRN
Status: DISCONTINUED | OUTPATIENT
Start: 2025-06-16 | End: 2025-06-16

## 2025-06-16 RX ORDER — FENTANYL CITRATE 50 UG/ML
25 INJECTION, SOLUTION INTRAMUSCULAR; INTRAVENOUS EVERY 5 MIN PRN
Status: DISCONTINUED | OUTPATIENT
Start: 2025-06-16 | End: 2025-06-16 | Stop reason: HOSPADM

## 2025-06-16 RX ORDER — ONDANSETRON 4 MG/1
4 TABLET, ORALLY DISINTEGRATING ORAL EVERY 30 MIN PRN
Status: DISCONTINUED | OUTPATIENT
Start: 2025-06-16 | End: 2025-06-16 | Stop reason: HOSPADM

## 2025-06-16 RX ORDER — MAGNESIUM HYDROXIDE 1200 MG/15ML
LIQUID ORAL PRN
Status: DISCONTINUED | OUTPATIENT
Start: 2025-06-16 | End: 2025-06-16 | Stop reason: HOSPADM

## 2025-06-16 RX ORDER — TRANEXAMIC ACID 10 MG/ML
1 INJECTION, SOLUTION INTRAVENOUS ONCE
Status: COMPLETED | OUTPATIENT
Start: 2025-06-16 | End: 2025-06-16

## 2025-06-16 RX ORDER — SODIUM CHLORIDE, SODIUM LACTATE, POTASSIUM CHLORIDE, CALCIUM CHLORIDE 600; 310; 30; 20 MG/100ML; MG/100ML; MG/100ML; MG/100ML
INJECTION, SOLUTION INTRAVENOUS CONTINUOUS
Status: DISCONTINUED | OUTPATIENT
Start: 2025-06-16 | End: 2025-06-17

## 2025-06-16 RX ADMIN — FENTANYL CITRATE 25 MCG: 50 INJECTION INTRAMUSCULAR; INTRAVENOUS at 16:48

## 2025-06-16 RX ADMIN — Medication 2 G: at 16:40

## 2025-06-16 RX ADMIN — FENTANYL CITRATE 25 MCG: 50 INJECTION INTRAMUSCULAR; INTRAVENOUS at 19:32

## 2025-06-16 RX ADMIN — HYDROMORPHONE HYDROCHLORIDE 0.2 MG: 0.2 INJECTION, SOLUTION INTRAMUSCULAR; INTRAVENOUS; SUBCUTANEOUS at 05:33

## 2025-06-16 RX ADMIN — PROPOFOL 20 MG: 10 INJECTION, EMULSION INTRAVENOUS at 16:49

## 2025-06-16 RX ADMIN — VASOPRESSIN: 20 INJECTION, SOLUTION INTRAVENOUS at 03:44

## 2025-06-16 RX ADMIN — ONDANSETRON 4 MG: 2 INJECTION INTRAMUSCULAR; INTRAVENOUS at 17:27

## 2025-06-16 RX ADMIN — FENTANYL CITRATE 25 MCG: 50 INJECTION INTRAMUSCULAR; INTRAVENOUS at 17:39

## 2025-06-16 RX ADMIN — TRANEXAMIC ACID 1 G: 10 INJECTION, SOLUTION INTRAVENOUS at 17:02

## 2025-06-16 RX ADMIN — PROPOFOL 100 MG: 10 INJECTION, EMULSION INTRAVENOUS at 16:48

## 2025-06-16 RX ADMIN — HYDROMORPHONE HYDROCHLORIDE 0.2 MG: 0.2 INJECTION, SOLUTION INTRAMUSCULAR; INTRAVENOUS; SUBCUTANEOUS at 21:08

## 2025-06-16 RX ADMIN — FAMOTIDINE 20 MG: 20 TABLET, FILM COATED ORAL at 21:13

## 2025-06-16 RX ADMIN — ACETAMINOPHEN 975 MG: 325 TABLET, FILM COATED ORAL at 09:52

## 2025-06-16 RX ADMIN — FENTANYL CITRATE 25 MCG: 50 INJECTION INTRAMUSCULAR; INTRAVENOUS at 17:08

## 2025-06-16 RX ADMIN — LEVOTHYROXINE SODIUM 37.5 MCG: 75 TABLET ORAL at 10:48

## 2025-06-16 RX ADMIN — ASPIRIN 81 MG: 81 TABLET, DELAYED RELEASE ORAL at 21:12

## 2025-06-16 RX ADMIN — Medication 1 G: at 09:53

## 2025-06-16 RX ADMIN — PHENYLEPHRINE HYDROCHLORIDE 0.5 MCG/KG/MIN: 10 INJECTION INTRAVENOUS at 17:02

## 2025-06-16 RX ADMIN — SODIUM CHLORIDE, SODIUM LACTATE, POTASSIUM CHLORIDE, AND CALCIUM CHLORIDE: .6; .31; .03; .02 INJECTION, SOLUTION INTRAVENOUS at 16:40

## 2025-06-16 RX ADMIN — HYDROXYZINE HYDROCHLORIDE 10 MG: 10 TABLET, FILM COATED ORAL at 23:09

## 2025-06-16 RX ADMIN — SENNOSIDES AND DOCUSATE SODIUM 1 TABLET: 50; 8.6 TABLET ORAL at 21:13

## 2025-06-16 RX ADMIN — SODIUM CHLORIDE, SODIUM LACTATE, POTASSIUM CHLORIDE, AND CALCIUM CHLORIDE: .6; .31; .03; .02 INJECTION, SOLUTION INTRAVENOUS at 21:08

## 2025-06-16 RX ADMIN — OXYCODONE HYDROCHLORIDE 5 MG: 5 TABLET ORAL at 03:54

## 2025-06-16 RX ADMIN — LIDOCAINE HYDROCHLORIDE 100 MG: 20 INJECTION, SOLUTION INFILTRATION; PERINEURAL at 16:48

## 2025-06-16 RX ADMIN — PHENYLEPHRINE HYDROCHLORIDE 100 MCG: 10 INJECTION INTRAVENOUS at 16:53

## 2025-06-16 RX ADMIN — OXYCODONE HYDROCHLORIDE 5 MG: 5 TABLET ORAL at 23:09

## 2025-06-16 RX ADMIN — ACETAMINOPHEN 975 MG: 325 TABLET, FILM COATED ORAL at 21:12

## 2025-06-16 ASSESSMENT — ACTIVITIES OF DAILY LIVING (ADL)
ADLS_ACUITY_SCORE: 51
ADLS_ACUITY_SCORE: 55
ADLS_ACUITY_SCORE: 54
ADLS_ACUITY_SCORE: 55
ADLS_ACUITY_SCORE: 55
ADLS_ACUITY_SCORE: 51
ADLS_ACUITY_SCORE: 55
ADLS_ACUITY_SCORE: 51
ADLS_ACUITY_SCORE: 55
ADLS_ACUITY_SCORE: 55
ADLS_ACUITY_SCORE: 49
ADLS_ACUITY_SCORE: 54
ADLS_ACUITY_SCORE: 51
ADLS_ACUITY_SCORE: 51
ADLS_ACUITY_SCORE: 54
ADLS_ACUITY_SCORE: 55

## 2025-06-16 ASSESSMENT — ENCOUNTER SYMPTOMS
ORTHOPNEA: 0
SEIZURES: 0

## 2025-06-16 NOTE — PROGRESS NOTES
[facility]    Medicine Progress Note - Hospitalist Service    Date of Admission:  6/15/2025    Assessment & Plan   Batsheva Fuentes is a 79 year old female with below PMHx admitted on 6/15/2025 after sustaining a mechanical fall at home with subsequent R femur fracture. Admitted for Orthopedic surgery. Of note, pt currently enrolled in hospice care with [facility] for her hx of frontotemporal dementia.      Mechanical fall with R femur fracture.   *XR shows acute mildly displaced and angulated intertrochanteric fracture of the right femur with moderate resulting varus angulation.   *CT head, cervical spine negative for acute pathology.   *Received 1 L bolus, fentanyl 50 mcg X2, IV dilaudid 0.5 mg X2 in the ED with minimal relief in pain  - Ortho consulted, made aware in the ED. Tentative plan for OR 6/16 but need to ensure optimization of sodium levels first; relayed to Coral Mares PA-C. Awaiting Nephrology input.  - Analgesic regimen with tylenol 975 mg TID, oxycodone 2.5-5 mg q4 hrs PRN, IV dilaudid 0.2-0.4 mg q2 hrs PRN, ice/heat   - Bowel regimen while on narcotics   - Bedrest until Ortho surgery   - NPO      Pre-op evaluation: RCRI estimated risk of adverse outcome with non-cardiac surgery is low risk. BMP with hyponatremia as below, otherwise unremarkable. CBC with significant leukocytosis as below. EKG with NSR, some TWI in V1-3. Last echo from 2004 unremarkable. UA and CXR negative for infection. No hx of adverse effect from anesthesia. No personal or family hx of VTE. No family hx of sudden death following surgery   - Nephrology consulted for hyponatremia, SIADH. Would like their input prior to proceeding with Orthopedic surgery. Appreciate recommendations for rae-op management of hyponatremia. Sodium checks q4 hrs to ensure stability/improvement, next check 1100.    - Significant leukocytosis noted on admission, improving on repeat. UA and CXR negative for infectious  etiology. Holding on antibiotics, monitor closely      Hyponatremia, SIADH: Sodium 128 on admission. S/P ~600-700 ccs of mIVF with 0.9% NS in the ED. No hx of hyponatremia noted. Good appetite reported by pt's . No excessive water consumption. No evidence of volume overload. Pt is on Lexapro with last dose 6/15 AM prior to presentation   *Osm 273, urine sodium 120, urine osm 530 consistent with SIADH. ? Pain mediated given fracture.   *TSH 18.33, T4 1.59.   *2% at 30 ccs/hr initiated early AM 6/16, increased to 50 ccs/hr 6/16 AM due to stable hyponatremia   - Nephrology consulted, appreciate input regarding hyponatremia management in the periop period. Would like to ensure stability prior to proceeding with Ortho surgery.   - Continue 2% NS at 50 ccs/hr   - Sodium checks q4 hrs, next check 11AM   - 1200 ml fluid restriction   - Hold Lexapro (note recent dose adjustment from 10mg>15 mg > 20 mg over the past month)  - Continue salt tabs 1 g BID ordered on admission  - Telemetry   - Seizure precautions   - Called pt's PCP clinic, sodium 136 in 1/2025, sodium 132 in 1/2024    ADDENDUM 1150: Repeat sodium improving to 130. Discussed with Dr. Oden of Nephrology. OK to proceed with Orthopedic surgery 6/16 afternoon. Will discontinue hypertonic saline. Repeat sodium post-op. Avoid fluids during surgery if possible, otherwise, can use hypertonic saline at low rate of 25 ccs/hr. Hold Lexapro today, if sodium stable can restart at reduced dose of 10 mg daily 6/16. Continue fluid restriction and salt tabs.     ADDENDUM 1821: Regarding post-op sodium monitoring, RN to page lab to obtain post-op sodium upon arrival back to the floor. RN to then page results out to the on-call Hospitalist. Continue with q4 hr sodium checks and please page values to on-call Hospitalist. Discussed with RN. Per Nephrology, plan overnight as follows:     If sodium <125, restart hypertonic IVF at 50 ml/hr until above 128  If sodium  >125, ok to just continue with fluid restriction and salt tabs  If sodium <120, page Nephrology. (Dr. Burden)    Leukocytosis, improving: WBC 28.3, ANC 25 on admission. No infectious sx reported on admission. No open wounds or sores. No recent steroids. Afebrile. Baseline WBC normal when checked a couple years prior. UA unremarkable. No known hx of MDS. ? Reactive in the setting of stress from fracture, but seems a bit high for that. CXR negative for evidence of infection. Repeat WBC 13.9. procal 0.58 (elevation in the setting of trauma from fall above?).  - Continue to monitor for infectious sx   - Continue to hold antibiotics   - Called pt's PCP clinic, WBC 7.99 in 1/2025, 6.8 in 1/2024    Elevated Tbili: 1.4 with direct bili 0.33. No abdominal TTP. Appetite has been good.   - Monitor clinically, repeat labs in the AM     Elevated BNP: 1857. No evidence of volume overload. No cardiac sx. No recent echo.   - Monitor     Abnormal TFTs   Hx of hypothyroidism: TSH 18.33, T4 1.59. Pt maintained on Synthroid  37.5 mcg daily PTA, but was decreased from 75 mcg ~1 month PTA upon enrollment in hospice. TSH 2.50 and T4 1.52 in 1/2025.   - Continue Synthroid at current dose, suspect stress demargination from above. Repeat TFTs in 4-6 weeks.     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 Rouse of Neurology 4/10/25 and Dr. Collado of Psychiatry  - Continue PTA Lexapro 20 mg po every day      Hypertension: Uncontrolled on admission in the setting of pain. Improving 6/16.   - Hold Losartan 50 mg po every day in anticipation of surgery, last dose 6/15 AM   - PRN hydralazine for SBP >180      Chronic, stable medical conditions   Diverticulitis s/p colectomy   Cerebral aneurysm, SAH  Polymyalgia rheumatica   Anxiety   Depression   Tardive akathisia, improved with Risperdone            Diet: NPO for Procedure/Surgery per Anesthesia Guidelines  Except for: Meds; Clear liquids before procedure/surgery: ADULT (Age GREATER than or Equal to 18 years) - Clear liquids 2 hours before procedure/surgery  Fluid restriction 1200 ML FLUID    DVT Prophylaxis: Pneumatic Compression Devices and Ambulate every shift  Izaguirre Catheter: Not present  Lines: None     Cardiac Monitoring: None  Code Status: No CPR- Do NOT Intubate      Clinically Significant Risk Factors Present on Admission         # Hyponatremia: Lowest Na = 126 mmol/L in last 2 days, will monitor as appropriate  # Hypochloremia: Lowest Cl = 91 mmol/L in last 2 days, will monitor as appropriate          # Hypertension: Noted on problem list                    Social Drivers of Health    Depression: At risk (9/25/2024)    Received from [facility]    PHQ-2     PHQ-2 TOTAL SCORE: 5    Received from [facility]    Social Connections          Disposition Plan     Medically Ready for Discharge: Anticipated in 2-4 Days           The patient's care was discussed with the Attending Physician, Dr. Tay, Bedside Nurse, Patient, and Patient's Family.    Kay Tam PA-C  Hospitalist Service  [facility]  Securely message with [employer] (more info)  Text page via [employer] Paging/Directory   ______________________________________________________________________    Interval History   Pain better controlled on above regimen. Sodium slowly drifting down overnight, pt started on 2% NS, repeat sodium stable this AM. Leukocytosis improving. Afebrile overnight. Continues without infectious sx.     Physical Exam   Vital Signs: Temp: 98.7  F (37.1  C) Temp src: Oral BP: (!) 141/86 Pulse: 71   Resp: 16 SpO2: 98 % O2 Device: None (Room air)    Weight: 0 lbs 0 oz    CONSTITUTIONAL: Pt laying in bed, dressed in hospital garb. Appears comfortable. Cooperative with interview. Accompanied by , Roscoe, at bedside.   HEENT:  Normocephalic, atraumatic.   CARDIOVASCULAR: RRR, no murmurs, rubs, or extra heart sounds appreciated. Pulses +2/4 and regular in upper and lower extremities, bilaterally.   RESPIRATORY: No increased work of breathing. CTA, bilat; no wheezes, rales, or rhonchi appreciated.  GASTROINTESTINAL:  Abdomen soft, non-distended. BS auscultated in all four quadrants. Negative for tenderness to palpation.  No masses or organomegaly noted.  MUSCULOSKELETAL: No gross deformities noted. Normal muscle tone.   HEMATOLOGIC/LYMPHATIC/IMMUNOLOGIC: Negative for lower extremity edema, bilaterally.  NEUROLOGIC: Alert and oriented to person and place, but not time, forgetful. No focal neuro deficits.   SKIN: Warm, dry, intact.       Medical Decision Making       50 MINUTES SPENT BY ME on the date of service doing chart review, history, exam, documentation & further activities per the note.      Data     I have personally reviewed the following data over the past 24 hrs:    13.9 (H)  \   11.1 (L)   / 282     126 (L); 126 (L) 93 (L) 17.8 /  128 (H)   4.2 23 0.71 \     TSH: 18.33 (H) T4: 1.59 A1C: N/A     INR:  1.03 PTT:  28   D-dimer:  N/A Fibrinogen:  N/A       Imaging results reviewed over the past 24 hrs:   Recent Results (from the past 24 hours)   XR Pelvis w Hip Right 1 View    Narrative    EXAM: XR PELVIS AND HIP RIGHT 1 VIEW  LOCATION: [facility]  DATE: 6/15/2025    INDICATION: Right hip pain after fall, concern for fx  COMPARISON: None.      Impression    IMPRESSION: Acute mildly displaced and angulated intertrochanteric fracture of the right femur with moderate resulting varus angulation. No additional fractures.   CT Head w/o Contrast    Narrative    EXAM: CT HEAD W/O CONTRAST  LOCATION: [facility]  DATE: 6/15/2025    INDICATION: Fall, possible head injury  COMPARISON: Head CT 05/20/2018  TECHNIQUE: Routine CT Head without IV contrast. Multiplanar reformats. Dose reduction  techniques were used.    FINDINGS: Moderate motion artifact present.    INTRACRANIAL CONTENTS: Right suboccipital craniectomy changes. There is a vascular clip lateral to the right aspect of the medulla. No intracranial hemorrhage. Mild diffuse cerebral parenchymal volume loss. No midline shift. The basilar cisterns are   patent. Mild periventricular and scattered foci of deep white matter hypodensities involving both cerebral hemispheres. No cerebellar tonsillar ectopia. No CT evidence for an acute infarct.    VISUALIZED ORBITS/SINUSES/MASTOIDS: Prior bilateral cataract surgery. Visualized portions of the orbits are otherwise unremarkable. No paranasal sinus mucosal disease. No middle ear or mastoid effusion.    BONES/SOFT TISSUES: Right frontal sumit hole.      Impression    IMPRESSION:  1.  No intracranial hemorrhage, mass lesions, hydrocephalus or CT evidence for an acute infarct seen within the limitations of this exam.  2.  Right suboccipital craniectomy changes. There is a vascular clip lateral to the right aspect of the medulla.  3.  Mild diffuse cerebral parenchymal volume loss. Presumed chronic hypertensive/microvascular ischemic white matter changes.     CT Cervical Spine w/o Contrast    Narrative    EXAM: CT CERVICAL SPINE W/O CONTRAST  LOCATION: [facility]  DATE: 6/15/2025    INDICATION: Fall, possible neck injury.  Suspect right hip fx  COMPARISON: None.  TECHNIQUE: Routine CT Cervical Spine without IV contrast. Multiplanar reformats. Dose reduction techniques were used.    FINDINGS:  VERTEBRA: Mild straightening of the normal cervical lordosis. 2 mm retrolisthesis from C3-C4 to C6-C7. Craniocervical junction is within normal limits. Vertebral body heights are maintained. No prevertebral edema. No fractures.    CANAL/FORAMINA: Moderate intervertebral disc height loss and endplate spurring from C4-C5 to C6-C7. Circumferential disc osteophyte complex from C3-C4 to C6-C7. Moderate  spinal canal narrowing at C3-C4. Moderate severe spinal canal narrowing at C4-C5   C5-C6. Moderate spinal canal narrowing at C6-C7.    Mild left neuroforaminal narrowing at C2-C3. Severe bilateral neuroforaminal narrowing at C3-C4 and C4-C5. Severe right and mild to moderate left neuroforaminal narrowing at C5-C6. Severe left and moderate right neuroforaminal narrowing at C6-C7.    PARASPINAL: Multinodular thyroid gland. The lung apices are clear.      Impression    IMPRESSION:  1.  No fracture or posttraumatic subluxation.  2.  Moderate to severe spinal canal narrowing at C4-C5 and C5-C6. Moderate spinal canal narrowing at C3-C4 and C6-C7.  3.  Severe bilateral neuroforaminal narrowing at C3-C4 and C4-C5. Severe right and mild to moderate left neuroforaminal narrowing at C5-C6. Severe left and moderate right neuroforaminal narrowing at C6-C7.     XR Chest Port 1 View    Narrative    EXAM: XR CHEST PORT 1 VIEW  LOCATION: [facility]  DATE: 6/15/2025    INDICATION: leukocytosis, rule out infection. No URI sx reported, no cough.  COMPARISON: Chest x-ray 3/21/2021      Impression    IMPRESSION: Cardiac silhouette size is within normal limits. Mild calcified atherosclerotic changes of the aortic arch. Visualized lungs are clear.

## 2025-06-16 NOTE — ANESTHESIA PREPROCEDURE EVALUATION
Anesthesia Pre-Procedure Evaluation    Patient: Batsheva Fuentes   MRN: [id number] : 1945          Procedure : Procedure(s):  OPEN REDUCTION INTERNAL FIXATION RIGHT HIP FRACTURE USING INTRAMEDULLARY NAIL         No past medical history on file.   Past Surgical History:   Procedure Laterality Date    ENT SURGERY      GYN SURGERY      IR CAROTID ANGIOGRAM  2005    IR CAROTID ANGIOGRAM  2005    IR MISCELLANEOUS PROCEDURE  2005    IR MISCELLANEOUS PROCEDURE  2005    IR MISCELLANEOUS PROCEDURE  2005    IR MISCELLANEOUS PROCEDURE  2005    IR MISCELLANEOUS PROCEDURE  2005    IR MISCELLANEOUS PROCEDURE  3/8/2005    IR MISCELLANEOUS PROCEDURE  2006      Allergies   Allergen Reactions    Blood Transfusion Related (Informational Only) Other (See Comments)     Patient has a history of a clinically significant antibody against RBC antigens.  A delay in compatible RBCs may occur.      Social History     Tobacco Use    Smoking status: Never    Smokeless tobacco: Never   Substance Use Topics    Alcohol use: Never      Wt Readings from Last 1 Encounters:   21 62.9 kg (138 lb 11.2 oz)        EKG  Sinus rhythm   Septal infarct , age undetermined   T wave abnormality, consider anterior ischemia   Abnormal ECG   When compared with ECG of 2005 11:10,   Septal infarct is now Present   Confirmed by GENERATED REPORT, COMPUTER (   Anesthesia Evaluation   Pt has had prior anesthetic.     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     (+)   dementia,                          (-) no seizures, no CVA and migraines   Cardiovascular:     (+)  hypertension- -   -  - -                           valvular problems/murmurs        (-) CHF and orthopnea/PND   METS/Exercise  "Tolerance:     Hematologic: Comments: Antibodies to RBC      Musculoskeletal: Comment: Polymyalgia rheumatica    Mechanical fall   (+)     fracture, Fracture location: RLE,         GI/Hepatic:  - neg GI/hepatic ROS  (-) GERD   Renal/Genitourinary:  - neg Renal ROS     Endo: Comment: Hyponatremia - improving    SIADH    (+)          thyroid problem, hypothyroidism,        (-) Type II DM   Psychiatric/Substance Use: Comment: Compulsion/ repetitive behavior    (+) psychiatric history anxiety and depression       Infectious Disease:       Malignancy:       Other:              Physical Exam  Airway  Mallampati: II  TM distance: >3 FB  Neck ROM: limited  Mouth opening: < 4 cm    Cardiovascular   Rhythm: regular  Rate: normal rate     Dental   (+) Minor Abnormalities - some fillings, tiny chips      Pulmonary - normal examBreath sounds clear to auscultation        Neurological   She appears awake, alert and confused.    Other Findings       OUTSIDE LABS:  CBC:   Lab Results   Component Value Date    WBC 13.9 (H) 06/16/2025    WBC 28.3 (H) 06/15/2025    HGB 11.1 (L) 06/16/2025    HGB 12.1 06/15/2025    HCT 30.5 (L) 06/16/2025    HCT 35.1 06/15/2025     06/16/2025     06/15/2025     BMP:   Lab Results   Component Value Date     (L) 06/16/2025     (L) 06/16/2025     (L) 06/16/2025    POTASSIUM 4.2 06/16/2025    POTASSIUM 5.0 06/15/2025    CHLORIDE 93 (L) 06/16/2025    CHLORIDE 91 (L) 06/15/2025    CO2 23 06/16/2025    CO2 24 06/15/2025    BUN 17.8 06/16/2025    BUN 19.9 06/15/2025    CR 0.71 06/16/2025    CR 0.81 06/15/2025     (H) 06/16/2025     (H) 06/15/2025     COAGS:   Lab Results   Component Value Date    PTT 28 06/15/2025    INR 1.03 06/15/2025     POC: No results found for: \"BGM\", \"HCG\", \"HCGS\"  HEPATIC:   Lab Results   Component Value Date    ALBUMIN 3.7 06/16/2025    PROTTOTAL 5.9 (L) 06/16/2025    ALT 20 06/16/2025    AST 37 06/16/2025    ALKPHOS 58 06/16/2025    " BILITOTAL 1.4 (H) 06/16/2025     OTHER:   Lab Results   Component Value Date    A1C 5.2 06/16/2025    ABBEY 8.8 06/16/2025    MAG 1.9 06/16/2025    LIPASE 268 (H) 02/06/2005    TSH 18.33 (H) 06/15/2025    T4 1.59 06/15/2025    .0 (H) 12/17/2020    SED 83 (H) 12/17/2020       Anesthesia Plan    ASA Status:  3       Anesthesia Type: General.  Airway: supraglottic airway.  Induction: intravenous.  Maintenance: Balanced.   Techniques and Equipment:     - Airway:  Planned airway equipment includes supraglottic airway.     - Monitoring Plan: standard ASA monitoring     Consents       Blood Consent:      - Discussed with: patient, family.     Postoperative Care    Pain management: multimodal analgesia.     Comments:    Other Comments: / family concerned about prolonged intubation post op               Kike Avina MD    I have reviewed the pertinent notes and labs in the chart from the past 30 days and (re)examined the patient.  Any updates or changes from those notes are reflected in this note.    Clinically Significant Risk Factors Present on Admission         # Hyponatremia: Lowest Na = 126 mmol/L in last 2 days, will monitor as appropriate  # Hypochloremia: Lowest Cl = 91 mmol/L in last 2 days, will monitor as appropriate          # Hypertension: Noted on problem list

## 2025-06-16 NOTE — PROVIDER NOTIFICATION
Brief update:    Recheck sodium of 126.    Has fairly significant SIADH with urine sodium of 120.    Initial sodium of 128, received approximately 600-700 mL normal saline in the emergency department resulting in drop in sodium to 127 on initial recheck.  Now dropping further to 126.    Did receive 1 g of oral sodium chloride on admission.    Initiating 2% saline infusion at 30 mL/h.  Recheck sodium at 5 AM, a.m. BMP.    Please page with sodium results so infusion can be adjusted if needed.  Goal normonatremia by 6/16 afternoon.    Gilbert Norman MD

## 2025-06-16 NOTE — CONSULTS
[facility]    Nephrology Consultation     Date of Admission:  6/15/2025    Assessment & Plan     Batsheva Fuentes is a 79 year old female with PMH frontotemporal dementia, depression, HTN, tardive dyskinesia who was admitted on 6/15/2025 with R femur fracture after mechanical fall.     Assessment:    SIADH   Hyponatremia   Na 128 on presentation (baseline Na ~133) in setting of lexapro use with recent dose increase, hip fracture/pain. Na worsened to 126 after NS bolus. Urine Na 120, urine Osm 530 consistent with SIADH. TSH notably quite high but thought to be related to stress from fx. Suspect SIADH due to lexapro dose increase and pain from fx. Currently on 2% hypertonic saline.  - repeat Na 130, ok to proceed with surgery   - can discontinue hypertonic saline, repeat Na post-op   - 1.2L FR   - hold lexapro today, can restart tomorrow at 10 mg daily if Na stable   - q4h Na checks   - continue sodium chloride 1 g BID for now     Mechanical fall   R Femur fracture  Fall at home, R femur fracture, plan for repair today if Na ok per ortho.         Discussed plan with Kay Tam PA-C (hospitalist). Reviewed notes from Kay Tam PA-C/Dr Norman (hospitalist), Coral Mares PA-C (ortho), Dr. Munoz (ED).     Jyoti Oden MD   [facility]ph[facility]  [phone]  --------------------------------------------------------------------------------------------  Reason for Consult     I was asked to see the patient for hyponatremia.    Primary Care Physician     Eileen Go    Chief Complaint     fall    History is obtained from the patient and chart review.      History of Present Illness     Batsheva Fuentes is a 79 year old female who presents with mechanical fall.     She had a mechanical fall at home resulting in R femur fracture. Has been eating normally,  reports good appetite. She doesn't particularly like salty foods and doesn't add salt to her  "foods. They push her water intake to avoid dehydration. She currently feels ok. Denies n/v, dizziness, lightheadedness. She has some chronic balance issues.     Na on admission was 128, chronically is low normal. She was given 1L NS in ED, but Na worsened thereafter. She was started on sodium chloride tabs and 2% hypertonic saline overnight with stability at 126.     This AM, she reports feeling ok. No new symptoms. Pain well controlled unless she moves. Her  notes that she was quite stable on lexapro 10 mg daily for some time, but 2 weeks ago had increase to 15mg daily and last week to 20 mg daily.     Past Medical History   I have reviewed this patient's medical history and updated it with pertinent information if needed.   No past medical history on file.    Past Surgical History   I have reviewed this patient's surgical history and updated it with pertinent information if needed.  Past Surgical History:   Procedure Laterality Date    ENT SURGERY      GYN SURGERY      IR CAROTID ANGIOGRAM  2/18/2005    IR CAROTID ANGIOGRAM  2/21/2005    IR MISCELLANEOUS PROCEDURE  2/18/2005    IR MISCELLANEOUS PROCEDURE  2/18/2005    IR MISCELLANEOUS PROCEDURE  2/21/2005    IR MISCELLANEOUS PROCEDURE  2/21/2005    IR MISCELLANEOUS PROCEDURE  2/21/2005    IR MISCELLANEOUS PROCEDURE  3/8/2005    IR MISCELLANEOUS PROCEDURE  4/12/2006       Prior to Admission Medications   Prior to Admission Medications   Prescriptions Last Dose Informant Patient Reported? Taking?   UNKNOWN TO PATIENT 6/14/2025 Bedtime  Yes Yes   Sig: Take 1 tablet by mouth at bedtime. OTC \"Sleep Aid\"   escitalopram (LEXAPRO) 20 MG tablet 6/15/2025 Morning  Yes Yes   Sig: Take 20 mg by mouth daily.   levothyroxine (SYNTHROID/LEVOTHROID) 75 MCG tablet 6/15/2025 Morning  Yes Yes   Sig: Take 37.5 mcg by mouth daily.   losartan (COZAAR) 100 MG tablet 6/15/2025 Morning  Yes Yes   Sig: Take 50 mg by mouth daily.      Facility-Administered Medications: None "     Allergies   Allergies   Allergen Reactions    Blood Transfusion Related (Informational Only) Other (See Comments)     Patient has a history of a clinically significant antibody against RBC antigens.  A delay in compatible RBCs may occur.       Social History   I have reviewed this patient's social history and updated it with pertinent information if needed. Batsheva Fuentes  reports that she has never smoked. She has never used smokeless tobacco. She reports that she does not drink alcohol and does not use drugs.    Family History   I have reviewed this patient's family history and updated it with pertinent information if needed.   No family history on file.    Review of Systems   The 10 point Review of Systems is negative other than noted in the HPI.     Physical Exam   Temp: 99.3  F (37.4  C) Temp src: Oral BP: (!) 148/81 Pulse: 77   Resp: 16 SpO2: 98 % O2 Device: None (Room air)    Vital Signs with Ranges  Temp:  [98  F (36.7  C)-99.7  F (37.6  C)] 99.3  F (37.4  C)  Pulse:  [71-95] 77  Resp:  [8-22] 16  BP: (141-209)/() 148/81  SpO2:  [92 %-100 %] 98 %  0 lbs 0 oz    GENERAL: NAD, frail appearing  HEENT:  Normocephalic. MMM  CV: RRR, no murmurs  RESP: Clear bilaterally with good efforts  MUSCULOSKELETAL: no LE edema  NEURO: Alert, oriented, normal speech. Tremor in R arm  PSYCH: affect flat      Data   BMP  Recent Labs   Lab 06/16/25  0540 06/16/25  0123 06/15/25  1947 06/15/25  1516   *  126* 126* 127* 128*   POTASSIUM 4.2  --   --  5.0   CHLORIDE 93*  --   --  91*   ABBEY 8.8  --   --  9.1   CO2 23  --   --  24   BUN 17.8  --   --  19.9   CR 0.71  --   --  0.81   *  --   --  106*     Phos@LABRCNTIPR(phos:4)  CBC)  Recent Labs   Lab 06/16/25  0540 06/15/25  1947   WBC 13.9* 28.3*   HGB 11.1* 12.1   HCT 30.5* 35.1   MCV 82 85    341     Recent Labs   Lab 06/16/25  0540   AST 37   ALT 20   ALKPHOS 58   BILITOTAL 1.4*     Recent Labs   Lab 06/15/25  1516   INR 1.03     No results  "found for: \"D2VIT\", \"D3VIT\", \"DTOT\"  Recent Labs   Lab 06/16/25  0540   HGB 11.1*   HCT 30.5*   MCV 82     No results for input(s): \"PTHI\" in the last 168 hours.  Color Urine (no units)   Date Value   06/15/2025 Light Yellow   03/21/2021 Orange     Appearance Urine (no units)   Date Value   06/15/2025 Slightly Cloudy (A)   03/21/2021 Slightly Cloudy     Glucose Urine (mg/dL)   Date Value   06/15/2025 Negative   03/21/2021 Negative     Bilirubin Urine (no units)   Date Value   06/15/2025 Negative   03/21/2021 Negative     Ketones Urine (mg/dL)   Date Value   06/15/2025 20 (A)   03/21/2021 Negative     Specific Gravity Urine (no units)   Date Value   06/15/2025 1.015   03/21/2021 1.013     pH Urine   Date Value   06/15/2025 7.5 (H)   03/21/2021 6.0 pH     Protein Albumin Urine (mg/dL)   Date Value   06/15/2025 Negative   03/21/2021 70 (A)     Urobilinogen Urine (EU/dL)   Date Value   02/16/2005 0.2     Nitrite Urine (no units)   Date Value   06/15/2025 Negative   03/21/2021 Positive (A)     Leukocyte Esterase Urine (no units)   Date Value   06/15/2025 Negative   03/21/2021 Large (A)           Jyoti Oden MD   [facility]ph[facility]  [phone]   "

## 2025-06-16 NOTE — PLAN OF CARE
Goal Outcome Evaluation:       Shift Summary [id number]    Admitting Diagnosis: Closed displaced intertrochanteric fracture of right femur, initial encounter (H) [S72.141A]  Hip fracture, right, closed, initial encounter (H) [S72.001A]   Vitals WNL  PainID of 4/10, managed with ice repositioning and scheduled medications  A&Ox3 disoriented to time  Voiding ex cath in place   Mobility bedrest  Tele SR  CMS intact  Lung Sounds clear on RA  GI WNL    None       Plan: went down to surgery at around 1500. Na improved, recheck when she returns to floor.    - per hospitalist ensure that Na is checked right away upon her arrival to the floor and page on call hospitalist with the results. See day hospitalist note for more information.

## 2025-06-16 NOTE — CONSULTS
[facility]    Orthopedic Consultation    Batsheva Fuentes MRN# [id number]   Age: 79 year old YOB: 1945     Date of Admission:  6/15/2025    Reason for consult: Right hip intertrochanteric fracture        Requesting provider: LOR Tam       Level of consult: Consult, follow and place orders           Assessment and Plan:   Assessment:   Right hip intertrochanteric fracture       Plan:   Patient scheduled for a right hip IM nail either later today.  Nephrology okay with proceeding.   Surgeon: Dr Mccullough  NPO Status effective now   NWB/Bedrest until postop  Hold anticoagulants if taken  Pain medication as needed, minimize narcotics as able             Chief Complaint:   Right hip pain          History of Present Illness:   Batsheva Fuentes is a 79 year old female with PMHx dementia, parkinsonism, HTN, PMR admitted on 6/15/2025 after sustaining a mechanical fall at home with subsequent right femur fracture.  Per notes, patient's  describes a mechanical fall to the ground on day of admission with subsequent right hip pain. Of note, patient currently enrolled in hospice care with [facility] for her hx of frontotemporal dementia.            Past Medical History:   No past medical history on file.          Past Surgical History:     Past Surgical History:   Procedure Laterality Date    ENT SURGERY      GYN SURGERY      IR CAROTID ANGIOGRAM  2/18/2005    IR CAROTID ANGIOGRAM  2/21/2005    IR MISCELLANEOUS PROCEDURE  2/18/2005    IR MISCELLANEOUS PROCEDURE  2/18/2005    IR MISCELLANEOUS PROCEDURE  2/21/2005    IR MISCELLANEOUS PROCEDURE  2/21/2005    IR MISCELLANEOUS PROCEDURE  2/21/2005    IR MISCELLANEOUS PROCEDURE  3/8/2005    IR MISCELLANEOUS PROCEDURE  4/12/2006             Social History:     Social History     Tobacco Use    Smoking status: Never    Smokeless tobacco: Never   Substance Use Topics    Alcohol use: Never             Family History:   No family  history on file.          Immunizations:     VACCINE / DOSE   Diptheria   DPT   DTAP   HBIG   Hepatitis A   Hepatitis B   HIB   Influenza   Measles   Meningococcal   MMR   Mumps   Pneumococcal   Polio   Rubella   Small Pox   TDAP   Varicella   Zoster             Allergies:     Allergies   Allergen Reactions    Blood Transfusion Related (Informational Only) Other (See Comments)     Patient has a history of a clinically significant antibody against RBC antigens.  A delay in compatible RBCs may occur.             Medications:     Current Facility-Administered Medications   Medication Dose Route Frequency Provider Last Rate Last Admin    acetaminophen (TYLENOL) tablet 975 mg  975 mg Oral TID Kay Tam PA-C   975 mg at 06/15/25 1934    bisacodyl (DULCOLAX) suppository 10 mg  10 mg Rectal Daily PRN Kay Tam PA-C        calcium carbonate (TUMS) chewable tablet 1,000 mg  1,000 mg Oral 4x Daily PRN Kay Tam PA-C        [Held by provider] escitalopram (LEXAPRO) tablet 20 mg  20 mg Oral Daily Kay Tam PA-C        hydrALAZINE (APRESOLINE) injection 10 mg  10 mg Intravenous Q4H PRN Kay Tam PA-C   10 mg at 06/15/25 1803    HYDROmorphone (DILAUDID) injection 0.2 mg  0.2 mg Intravenous Q2H PRN Kay Tam PA-C   0.2 mg at 06/16/25 0533    HYDROmorphone (DILAUDID) injection 0.4 mg  0.4 mg Intravenous Q2H PRN Kay Tam PA-C        levothyroxine (SYNTHROID/LEVOTHROID) half-tab 37.5 mcg  37.5 mcg Oral Daily Kay Tam PA-C        lidocaine (LMX4) cream   Topical Q1H PRN Kay Tam PA-C        lidocaine 1 % 0.1-1 mL  0.1-1 mL Other Q1H PRN Kay Tam PA-C        [Held by provider] losartan (COZAAR) tablet 50 mg  50 mg Oral Daily Kay Tam PA-C        naloxone (NARCAN) injection 0.2 mg  0.2 mg Intravenous Q2 Min PRN Sean Miles MD        Or    naloxone  (NARCAN) injection 0.4 mg  0.4 mg Intravenous Q2 Min PRN Sean Miles MD        Or    naloxone (NARCAN) injection 0.2 mg  0.2 mg Intramuscular Q2 Min PRN Sean Miles MD        Or    naloxone (NARCAN) injection 0.4 mg  0.4 mg Intramuscular Q2 Min PRN Sean Miles MD        ondansetron (ZOFRAN ODT) ODT tab 4 mg  4 mg Oral Q6H PRN Kay Tam PA-C        Or    ondansetron (ZOFRAN) injection 4 mg  4 mg Intravenous Q6H PRN Anel, Kay Fuchs PA-C        oxyCODONE (ROXICODONE) tablet 5 mg  5 mg Oral Q4H PRN Kay Tam PA-C   5 mg at 06/16/25 0354    oxyCODONE IR (ROXICODONE) half-tab 2.5 mg  2.5 mg Oral Q4H PRN Kay Tam PA-C        polyethylene glycol (MIRALAX) Packet 17 g  17 g Oral BID PRN Kay Tam PA-C        prochlorperazine (COMPAZINE) injection 5 mg  5 mg Intravenous Q6H PRN Kay Tam PA-C        Or    prochlorperazine (COMPAZINE) tablet 5 mg  5 mg Oral Q6H PRN Kay Tam PA-C        senna-docusate (SENOKOT-S/PERICOLACE) 8.6-50 MG per tablet 1 tablet  1 tablet Oral BID PRN Kay Tam PA-C        Or    senna-docusate (SENOKOT-S/PERICOLACE) 8.6-50 MG per tablet 2 tablet  2 tablet Oral BID PRN Nylam, Kay Fuchs PA-C        sodium chloride (PF) 0.9% PF flush 3 mL  3 mL Intracatheter Q8H LILLIE Kay Tam PA-C   3 mL at 06/16/25 0533    sodium chloride (PF) 0.9% PF flush 3 mL  3 mL Intracatheter q1 min prn Kay Tam PA-C        sodium chloride 2% infusion   Intravenous Continuous Norman, Gilbert Perera MD 50 mL/hr at 06/16/25 0654 Rate Change at 06/16/25 0654    sodium chloride tablet 1 g  1 g [facility] BID [facility]   1 g at 06/15/25 2223             Review of Systems:   ROS:  10 point ROS neg other than the symptoms noted above in the HPI.            Physical Exam:   All vitals have been reviewed  Patient Vitals for the  past 24 hrs:   BP Temp Temp src Pulse Resp SpO2   06/16/25 0700 (!) 148/81 99.3  F (37.4  C) Oral 77 16 98 %   06/16/25 0425 (!) 141/86 98.7  F (37.1  C) Oral 71 16 98 %   06/15/25 2108 (!) 146/84 99.7  F (37.6  C) Oral 95 18 99 %   06/15/25 1945 (!) 151/83 -- -- 87 (!) 8 99 %   06/15/25 1915 (!) 158/74 -- -- 87 13 98 %   06/15/25 1900 (!) 156/79 -- -- 87 15 97 %   06/15/25 1803 (!) 189/131 -- -- 90 18 97 %   06/15/25 1700 (!) 204/119 -- -- 82 20 100 %   06/15/25 1610 (!) 209/117 98  F (36.7  C) Oral 90 22 100 %   06/15/25 1559 -- 98.2  F (36.8  C) Oral -- -- --   06/15/25 1510 (!) 209/119 -- -- 77 18 99 %   06/15/25 1500 (!) 199/110 -- -- 78 20 92 %   06/15/25 1436 (!) 201/112 -- -- 76 18 92 %   06/15/25 1434 (!) 201/112 -- -- 76 -- 93 %       Intake/Output Summary (Last 24 hours) at 6/16/2025 0942  Last data filed at 6/16/2025 0654  Gross per 24 hour   Intake 563.33 ml   Output 250 ml   Net 313.33 ml         Physical Exam   Temp: 99.3  F (37.4  C) Temp src: Oral BP: (!) 148/81 Pulse: 77   Resp: 16 SpO2: 98 % O2 Device: None (Room air)    Vital Signs with Ranges  Temp:  [98  F (36.7  C)-99.7  F (37.6  C)] 99.3  F (37.4  C)  Pulse:  [71-95] 77  Resp:  [8-22] 16  BP: (141-209)/() 148/81  SpO2:  [92 %-100 %] 98 %  0 lbs 0 oz    Constitutional: Pleasant, alert, appropriate, following commands.  HEENT: Head atraumatic normocephalic.   Respiratory: Unlabored breathing no audible wheeze  Cardiovascular: Regular rate and rhythm per pulses  GI: Abdomen non-distended.  Lymph/Hematologic: No lymphadenopathy in areas examined  Genitourinary:  No green  Skin: No rashes, no cyanosis, no edema.  Musculoskeletal: On physical exam of the right lower extremity, patient's leg is resting in a shortened and externally rotated position.  No skin abrasions seen at the hip.  Patient is able to dorsi and plantarflex both ankles.  Reports equal sensation to light touch on the left versus right lower extremities.  Distal pulses are  intact and equal bilaterally.    Neurologic: normal without focal findings  Neuropsychiatric: baseline dementia            Data:   All laboratory data reviewed  Results for orders placed or performed during the hospital encounter of 06/15/25   CT Head w/o Contrast     Status: None    Narrative    EXAM: CT HEAD W/O CONTRAST  LOCATION: [facility]  DATE: 6/15/2025    INDICATION: Fall, possible head injury  COMPARISON: Head CT 05/20/2018  TECHNIQUE: Routine CT Head without IV contrast. Multiplanar reformats. Dose reduction techniques were used.    FINDINGS: Moderate motion artifact present.    INTRACRANIAL CONTENTS: Right suboccipital craniectomy changes. There is a vascular clip lateral to the right aspect of the medulla. No intracranial hemorrhage. Mild diffuse cerebral parenchymal volume loss. No midline shift. The basilar cisterns are   patent. Mild periventricular and scattered foci of deep white matter hypodensities involving both cerebral hemispheres. No cerebellar tonsillar ectopia. No CT evidence for an acute infarct.    VISUALIZED ORBITS/SINUSES/MASTOIDS: Prior bilateral cataract surgery. Visualized portions of the orbits are otherwise unremarkable. No paranasal sinus mucosal disease. No middle ear or mastoid effusion.    BONES/SOFT TISSUES: Right frontal sumit hole.      Impression    IMPRESSION:  1.  No intracranial hemorrhage, mass lesions, hydrocephalus or CT evidence for an acute infarct seen within the limitations of this exam.  2.  Right suboccipital craniectomy changes. There is a vascular clip lateral to the right aspect of the medulla.  3.  Mild diffuse cerebral parenchymal volume loss. Presumed chronic hypertensive/microvascular ischemic white matter changes.     CT Cervical Spine w/o Contrast     Status: None    Narrative    EXAM: CT CERVICAL SPINE W/O CONTRAST  LOCATION: [facility]  DATE: 6/15/2025    INDICATION: Fall, possible neck injury.   Suspect right hip fx  COMPARISON: None.  TECHNIQUE: Routine CT Cervical Spine without IV contrast. Multiplanar reformats. Dose reduction techniques were used.    FINDINGS:  VERTEBRA: Mild straightening of the normal cervical lordosis. 2 mm retrolisthesis from C3-C4 to C6-C7. Craniocervical junction is within normal limits. Vertebral body heights are maintained. No prevertebral edema. No fractures.    CANAL/FORAMINA: Moderate intervertebral disc height loss and endplate spurring from C4-C5 to C6-C7. Circumferential disc osteophyte complex from C3-C4 to C6-C7. Moderate spinal canal narrowing at C3-C4. Moderate severe spinal canal narrowing at C4-C5   C5-C6. Moderate spinal canal narrowing at C6-C7.    Mild left neuroforaminal narrowing at C2-C3. Severe bilateral neuroforaminal narrowing at C3-C4 and C4-C5. Severe right and mild to moderate left neuroforaminal narrowing at C5-C6. Severe left and moderate right neuroforaminal narrowing at C6-C7.    PARASPINAL: Multinodular thyroid gland. The lung apices are clear.      Impression    IMPRESSION:  1.  No fracture or posttraumatic subluxation.  2.  Moderate to severe spinal canal narrowing at C4-C5 and C5-C6. Moderate spinal canal narrowing at C3-C4 and C6-C7.  3.  Severe bilateral neuroforaminal narrowing at C3-C4 and C4-C5. Severe right and mild to moderate left neuroforaminal narrowing at C5-C6. Severe left and moderate right neuroforaminal narrowing at C6-C7.     XR Pelvis w Hip Right 1 View     Status: None    Narrative    EXAM: XR PELVIS AND HIP RIGHT 1 VIEW  LOCATION: [facility]  DATE: 6/15/2025    INDICATION: Right hip pain after fall, concern for fx  COMPARISON: None.      Impression    IMPRESSION: Acute mildly displaced and angulated intertrochanteric fracture of the right femur with moderate resulting varus angulation. No additional fractures.   XR Chest Port 1 View     Status: None    Narrative    EXAM: XR CHEST PORT 1 VIEW  LOCATION:  [facility]  DATE: 6/15/2025    INDICATION: leukocytosis, rule out infection. No URI sx reported, no cough.  COMPARISON: Chest x-ray 3/21/2021      Impression    IMPRESSION: Cardiac silhouette size is within normal limits. Mild calcified atherosclerotic changes of the aortic arch. Visualized lungs are clear.   INR     Status: Normal   Result Value Ref Range    INR 1.03 0.85 - 1.15    PT 13.3 11.8 - 14.8 Seconds   Partial thromboplastin time     Status: Normal   Result Value Ref Range    aPTT 28 22 - 38 Seconds   Basic metabolic panel     Status: Abnormal   Result Value Ref Range    Sodium 128 (L) 135 - 145 mmol/L    Potassium 5.0 3.4 - 5.3 mmol/L    Chloride 91 (L) 98 - 107 mmol/L    Carbon Dioxide (CO2) 24 22 - 29 mmol/L    Anion Gap 13 7 - 15 mmol/L    Urea Nitrogen 19.9 8.0 - 23.0 mg/dL    Creatinine 0.81 0.51 - 0.95 mg/dL    GFR Estimate 73 >60 mL/min/1.73m2    Calcium 9.1 8.8 - 10.4 mg/dL    Glucose 106 (H) 70 - 99 mg/dL   UA with Microscopic reflex to Culture     Status: Abnormal    Specimen: Urine, Midstream   Result Value Ref Range    Color Urine Light Yellow Colorless, Straw, Light Yellow, Yellow    Appearance Urine Slightly Cloudy (A) Clear    Glucose Urine Negative Negative mg/dL    Bilirubin Urine Negative Negative    Ketones Urine 20 (A) Negative mg/dL    Specific Gravity Urine 1.015 1.003 - 1.035    Blood Urine Trace (A) Negative    pH Urine 7.5 (H) 5.0 - 7.0    Protein Albumin Urine Negative Negative mg/dL    Urobilinogen Urine Normal Normal mg/dL    Nitrite Urine Negative Negative    Leukocyte Esterase Urine Negative Negative    Amorphous Crystals Urine Few (A) None Seen /HPF    RBC Urine 2 <=2 /HPF    WBC Urine 5 <=5 /HPF    Narrative    Urine Culture not indicated   CBC with platelets and differential     Status: Abnormal   Result Value Ref Range    WBC Count 28.3 (H) 4.0 - 11.0 10e3/uL    RBC Count 4.11 3.80 - 5.20 10e6/uL    Hemoglobin 12.1 11.7 - 15.7 g/dL    Hematocrit  35.1 35.0 - 47.0 %    MCV 85 78 - 100 fL    MCH 29.4 26.5 - 33.0 pg    MCHC 34.5 31.5 - 36.5 g/dL    RDW 13.2 10.0 - 15.0 %    Platelet Count 341 150 - 450 10e3/uL    % Neutrophils 89 %    % Lymphocytes 3 %    % Monocytes 8 %    % Eosinophils 0 %    % Basophils 0 %    % Immature Granulocytes 1 %    NRBCs per 100 WBC 0 <1 /100    Absolute Neutrophils 25.1 (H) 1.6 - 8.3 10e3/uL    Absolute Lymphocytes 0.7 (L) 0.8 - 5.3 10e3/uL    Absolute Monocytes 2.2 (H) 0.0 - 1.3 10e3/uL    Absolute Eosinophils 0.0 0.0 - 0.7 10e3/uL    Absolute Basophils 0.0 0.0 - 0.2 10e3/uL    Absolute Immature Granulocytes 0.2 <=0.4 10e3/uL    Absolute NRBCs 0.0 10e3/uL   Extra Tube     Status: None    Narrative    The following orders were created for panel order Extra Tube.  Procedure                               Abnormality         Status                     ---------                               -----------         ------                     Extra Red Top Tube[[id number]]                              Final result                 Please view results for these tests on the individual orders.   Extra Red Top Tube     Status: None   Result Value Ref Range    Hold Specimen JI    Osmolality urine     Status: Normal   Result Value Ref Range    Osmolality Urine 530 100 - 1,200 mmol/kg    Narrative    Reference Ranges depend on patient's hydration status and renal function.   Neonates:  mmol/kg   2 years and older, random specimens: 100-1200 mmol/kg; Greater than 850 mmol/kg after 12 hour fluid restriction  Urine/serum osmolality ratio: 2 years and older: 1.0-3.0; 3.0-4.7 after 12 hour fluid restriction   Osmolality     Status: Abnormal   Result Value Ref Range    Osmolality Blood 273 (L) 280 - 301 mmol/kg    Narrative    Greater than 385 mmol/kg relates to stupor in hyperglycemia   Greater than 400 mmol/kg can relate to seizures   Greater than 420 mmol/kg can be lethal    Serum Osmalar Gap:   Normal <10   Larger suggest unmeasured  substances present in serum (ethanol, methanol, isopropanol, mannitol, ethylene glycol).   Sodium random urine     Status: None   Result Value Ref Range    Sodium Urine mmol/L 120 mmol/L   Sodium     Status: Abnormal   Result Value Ref Range    Sodium 127 (L) 135 - 145 mmol/L   RBC and Platelet Morphology     Status: None   Result Value Ref Range    RBC Morphology Confirmed RBC Indices     Platelet Assessment  Automated Count Confirmed. Platelet morphology is normal.     Automated Count Confirmed. Platelet morphology is normal.   Sodium     Status: Abnormal   Result Value Ref Range    Sodium 126 (L) 135 - 145 mmol/L   TSH with free T4 reflex     Status: Abnormal   Result Value Ref Range    TSH 18.33 (H) 0.30 - 4.20 uIU/mL   T4 free     Status: Normal   Result Value Ref Range    Free T4 1.59 0.90 - 1.70 ng/dL   Basic metabolic panel     Status: Abnormal   Result Value Ref Range    Sodium 126 (L) 135 - 145 mmol/L    Potassium 4.2 3.4 - 5.3 mmol/L    Chloride 93 (L) 98 - 107 mmol/L    Carbon Dioxide (CO2) 23 22 - 29 mmol/L    Anion Gap 10 7 - 15 mmol/L    Urea Nitrogen 17.8 8.0 - 23.0 mg/dL    Creatinine 0.71 0.51 - 0.95 mg/dL    GFR Estimate 85 >60 mL/min/1.73m2    Calcium 8.8 8.8 - 10.4 mg/dL    Glucose 128 (H) 70 - 99 mg/dL   Sodium     Status: Abnormal   Result Value Ref Range    Sodium 126 (L) 135 - 145 mmol/L   CBC with platelets and differential     Status: Abnormal   Result Value Ref Range    WBC Count 13.9 (H) 4.0 - 11.0 10e3/uL    RBC Count 3.71 (L) 3.80 - 5.20 10e6/uL    Hemoglobin 11.1 (L) 11.7 - 15.7 g/dL    Hematocrit 30.5 (L) 35.0 - 47.0 %    MCV 82 78 - 100 fL    MCH 29.9 26.5 - 33.0 pg    MCHC 36.4 31.5 - 36.5 g/dL    RDW 13.3 10.0 - 15.0 %    Platelet Count 282 150 - 450 10e3/uL    % Neutrophils 77 %    % Lymphocytes 13 %    % Monocytes 10 %    % Eosinophils 0 %    % Basophils 0 %    % Immature Granulocytes 1 %    NRBCs per 100 WBC 0 <1 /100    Absolute Neutrophils 10.6 (H) 1.6 - 8.3 10e3/uL     Absolute Lymphocytes 1.7 0.8 - 5.3 10e3/uL    Absolute Monocytes 1.4 (H) 0.0 - 1.3 10e3/uL    Absolute Eosinophils 0.0 0.0 - 0.7 10e3/uL    Absolute Basophils 0.0 0.0 - 0.2 10e3/uL    Absolute Immature Granulocytes 0.1 <=0.4 10e3/uL    Absolute NRBCs 0.0 10e3/uL   Hepatic panel     Status: Abnormal   Result Value Ref Range    Protein Total 5.9 (L) 6.4 - 8.3 g/dL    Albumin 3.7 3.5 - 5.2 g/dL    Bilirubin Total 1.4 (H) <=1.2 mg/dL    Alkaline Phosphatase 58 40 - 150 U/L    AST 37 0 - 45 U/L    ALT 20 0 - 50 U/L    Bilirubin Direct 0.33 (H) 0.00 - 0.30 mg/dL   Hemoglobin A1c     Status: Normal   Result Value Ref Range    Estimated Average Glucose 103 <117 mg/dL    Hemoglobin A1C 5.2 <5.7 %   EKG 12-lead, tracing only     Status: None   Result Value Ref Range    Systolic Blood Pressure  mmHg    Diastolic Blood Pressure  mmHg    Ventricular Rate 81 BPM    Atrial Rate 81 BPM    DC Interval 158 ms    QRS Duration 86 ms     ms    QTc 462 ms    P Axis 95 degrees    R AXIS 24 degrees    T Axis 64 degrees    Interpretation ECG       Sinus rhythm  Septal infarct , age undetermined  T wave abnormality, consider anterior ischemia  Abnormal ECG  When compared with ECG of 08-Feb-2005 11:10,  Septal infarct is now Present  Confirmed by GENERATED REPORT, COMPUTER ([id number]),  Charla Sanchez ([id number]) on 6/15/2025 5:31:15 PM     Adult Type and Screen     Status: Abnormal   Result Value Ref Range    ABO/RH(D) A POS     Antibody Screen Positive (A) Negative    SPECIMEN EXPIRATION DATE 6/18/2025 11:59:00 PM CDT    Antibody identification     Status: None   Result Value Ref Range    Antibody, Previously Identified Anti-Dell     SPECIMEN EXPIRATION DATE 6/18/2025 11:59:00 PM CDT    Prepare red blood cells (unit)     Status: None (Preliminary result)   Result Value Ref Range    Blood Component Type Red Blood Cells     Product Code Y3489G97     Unit Status Ready for issue     Unit Number [id number]     CROSSMATCH COMPATIBLE      CODING SYSTEM [id number]    Prepare red blood cells (unit)     Status: None (Preliminary result)   Result Value Ref Range    Blood Component Type Red Blood Cells     Product Code Z6388O58     Unit Status Ready for issue     Unit Number [id number]     CROSSMATCH COMPATIBLE     CODING SYSTEM [id number]    ABO/Rh type and screen *Canceled*     Status: None ()    Narrative    The following orders were created for panel order ABO/Rh type and screen.  Procedure                               Abnormality         Status                     ---------                               -----------         ------                       Please view results for these tests on the individual orders.   CBC with platelets differential     Status: Abnormal    Narrative    The following orders were created for panel order CBC with platelets differential.  Procedure                               Abnormality         Status                     ---------                               -----------         ------                     CBC with platelets and ...[[id number]]  Abnormal            Final result               RBC and Platelet Morpho...[[id number]]                      Final result                 Please view results for these tests on the individual orders.   ABO/Rh type and screen *Canceled*     Status: None ()    Narrative    The following orders were created for panel order ABO/Rh type and screen.  Procedure                               Abnormality         Status                     ---------                               -----------         ------                     Adult Type and Screen[[id number]]                                                        Please view results for these tests on the individual orders.   ABO/Rh type and screen     Status: Abnormal    Narrative    The following orders were created for panel order ABO/Rh type and screen.  Procedure                               Abnormality         Status                      ---------                               -----------         ------                     Adult Type and Screen[[id number]]       Abnormal            Final result                 Please view results for these tests on the individual orders.   CBC with platelets differential     Status: Abnormal    Narrative    The following orders were created for panel order CBC with platelets differential.  Procedure                               Abnormality         Status                     ---------                               -----------         ------                     CBC with platelets and ...[[id number]]  Abnormal            Final result                 Please view results for these tests on the individual orders.          Attestation:  I have reviewed today's vital signs, notes, medications, labs and imaging with Dr. Mccullough.  Amount of time performed on this consult: 50 minutes.    Coral Mares PA-C

## 2025-06-16 NOTE — PROGRESS NOTES
RECEIVING UNIT ED HANDOFF REVIEW    ED Nurse Handoff Report was reviewed by: Josee Estrella RN on Lara 15, 2025 at 8:22 PM

## 2025-06-16 NOTE — PROGRESS NOTES
Mental Status: A/O x3 (Disorient to time)  Activity/dangle: Bedrest  Diet: NPO (has fluid restrictions of 1200ml)  Pain: PRN Oxy and PRN dilaudid given   Izaguirre/Voiding: Sy intact   02/LDA: On RA. PIV on left lower arm running 2% NS at 50 ml/hr  D/C Date: Pending   Other info:  at bedside

## 2025-06-16 NOTE — ANESTHESIA PROCEDURE NOTES
Airway       Patient location during procedure: OR  Staff -        CRNA: Mack Triplett APRN CRNA       Performed By: CRNA  Consent for Airway        Urgency: elective  Indications and Patient Condition       Indications for airway management: rae-procedural       Induction type:intravenous       Mask difficulty assessment: 1 - vent by mask    Final Airway Details       Final airway type: supraglottic airway    Supraglottic Airway Details        Type: LMA       Brand: I-Gel       LMA size: 4    Post intubation assessment        Placement verified by: capnometry, equal breath sounds and chest rise        Number of attempts at approach: 1       Number of other approaches attempted: 0       Ease of procedure: easy       Dentition: Intact and Unchanged

## 2025-06-16 NOTE — ANESTHESIA CARE TRANSFER NOTE
Patient: Batsheva Fuentes    Procedure: Procedure(s):  OPEN REDUCTION INTERNAL FIXATION RIGHT HIP FRACTURE USING INTRAMEDULLARY NAIL       Diagnosis: Hip fracture, right, closed, initial encounter (H) [S72.001A]  Diagnosis Additional Information: No value filed.    Anesthesia Type:   General     Note:    Oropharynx: oropharynx clear of all foreign objects  Level of Consciousness: drowsy  Oxygen Supplementation: nasal cannula  Level of Supplemental Oxygen (L/min / FiO2): 4  Independent Airway: airway patency satisfactory and stable  Dentition: dentition unchanged  Vital Signs Stable: post-procedure vital signs reviewed and stable  Report to RN Given: handoff report given  Patient transferred to: PACU  Comments: At end of procedure, spontaneous respirations, adequate tidal volumes, followed commands to voice, LMA removed atraumatically, airway patent after LMA removal. Oxygen via nasal cannula at 4 liters per minute to PACU. Oxygen tubing connected to wall O2 in PACU, SpO2, NiBP, and EKG monitors and alarms on and functioning, Karrie Hugger warmer connected to patient gown, report on patient's clinical status given to PACU RN, RN questions answered.      Handoff Report: Identifed the Patient, Identified the Reponsible Provider, Reviewed the pertinent medical history, Discussed the surgical course, Reviewed Intra-OP anesthesia mangement and issues during anesthesia, Set expectations for post-procedure period and Allowed opportunity for questions and acknowledgement of understanding  Vitals:  Vitals Value Taken Time   /96 06/16/25 17:53   Temp     Pulse 81 06/16/25 17:57   Resp 18 06/16/25 17:57   SpO2 100 % 06/16/25 17:57   Vitals shown include unfiled device data.    Electronically Signed By: GIANCARLO Hernandez CRNA  June 16, 2025  5:57 PM

## 2025-06-16 NOTE — PROGRESS NOTES
FOLLOW UP:     CT head and CT cervical spine negative for acute pathology.     Repeat sodium 127, osm 273, urine sodium 120, urine osm 530 consistent with SIADH. ? Pain mediated given fracture. No hx of hyponatremia. No excessive water consumption described, appetite has been good per  report. No evidence of volume overload.   - Stop mIVF   - Start 1200 ml fluid restriction   - Salt tabs 1 g BID   - Repeat sodium at 0100 and again at 0600     Note WBC 28.3, ANC 25. No infectious sx reported on admission. No open wounds or sores. No recent steroids. Afebrile. Baseline WBC normal when checked a couple years prior. UA unremarkable. No known hx of MDS. ? Reactive in the setting of stress from fracture, but seems a bit high for that.   - CXR for completeness   - Monitor for infectious sx, fever   - Repeat CBC in the AM     ADDENDUM 2200: CXR negative for infectious etiology. Plan as above. Signed out case to Dr. Norman, on-call Hospitalist, who will follow-up on pt's 1AM sodium. Appreciate help.     ADDENDUM 2250: Upon further review of MAR, pt did not receive a 1 L bolus + mIVF, but received ~700 ccs total 0.9% NS mIVF since admission. UA and sodium studies were obtained in the midst of receiving IVFs. Hold placed on PTA Lexapro (last dose 6/15 at home prior to presentation). Check TSH (hx of hypothyroidism on Synthroid)  WIll re-evaluate medical optimization for Orthopedic Surgery in the AM pending repeat lab work.    Discussed with Dr. Miles, attending.

## 2025-06-16 NOTE — PROGRESS NOTES
Shift Summary [id number]    Admitting Diagnosis: Closed displaced intertrochanteric fracture of right femur, initial encounter (H) [S72.141A]  Hip fracture, right, closed, initial encounter (H) [S72.001A]   Vitals WDL  Yung Tylenol and PRN oxy for pain  A&Ox3, disoriented to time. Baseline dementia  Voiding purewick in place  Mobility Bedrest  CMS intact  GI no BM on shift. Last BM 6/15 per pt   LUE skin tears covered with mepi, CDI  Fluid restriction of 1200 ml  Chest XR completed  Monitoring Na trend      Orders Placed This Encounter      NPO for Procedure/Surgery per Anesthesia Guidelines Except for: Meds; Clear liquids before procedure/surgery: ADULT (Age GREATER than or Equal to 18 years) - Clear liquids 2 hours before procedure/surgery       Plan: orthopedics consulted. Bedrest.

## 2025-06-16 NOTE — OP NOTE
Preoperative diagnosis:  Intertrochanteric right hip fracture    Postoperative diagnosis:  As above    Procedure:  Closed reduction insertion intramedullary nail right hip    Surgeon:  Easton Mccullough MD     Assistant:  Kassy Loya PA-C  A physicians assistant was available for the surgery and participated to decrease the patient's morbidity by assisting with positioning, manipulation of the limb during the procedure, surgical retraction as necessary, closure of the surgical wound and transferring the patient back to a hospital bed    Anesthesia:  General    Estimated blood loss:  200 cc    Complications:  None readily apparent    Indications for procedure:  Batsheva Fuentes is a 79 year old female who sustained a fall from standing resulting in immediate hip pain. The patient was evaluated and noted to have a fracture in the emergency room. She was admitted for treatment. There, we evaluated the x-rays and noted to have a displaced intertrochanteric hip fracture. The patient was counseled regarding treatment options and recommendation was made for cephalomedullary nail fixation in order to allow early weight bearing. She was amenable to this plan as was the family. They understood the risks of the procedure and wished to proceed.    Description of Procedure:  Batsheva was identified in the preoperative area. Informed consent was obtained as outlined above.  The patient was in agreement.  Subsequently, the hip was marked. The patient was then brought back to the operating room where they were placed under an appropriate anesthetic. The patient was then carefully positioned on the fracture table ensuring all bony prominences were well padded, and a closed reduction was performed. The contralateral leg was then placed into the well leg gomes, and a C-arm was used to ensure adequate reduction of the injury. This being achieved, the patient was then prepped and draped in a standard sterile fashion. A time-out was called  by the surgical team. All in attendance were in agreement that the right hip was the correct operative site.  The correct laterality, procedure and hospital identification number were confirmed.     A guide wire placement into the tip of the greater trochanter. An incision was created to allow placement of the guide all to ensure proper placement of the starting point on orthogonal views using fluoroscopic imagine intensification. The guide pin was then inserted to the level of the lesser trochanter and the opening reamer was used to open the proximal aspect of the femur. At that point, the nail was inserted manually. Next, using the cephalomedullary guide jig, a guide pin was drilled into the center position on both the AP and lateral giving us appropriate tip-to-apex distance of our lag screw. It was then measured. The guide wire was drilled over and our cephalomedullary screw was inserted. We then proceeded to place a distal interlock screw. Excellent purchase was obtained using the distal interlock screw. Next, all instruments were removed. We again took final fluoroscopic images and being satisfied with the construct, irrigated the wounds. Next, the wounds were closed in a layered fashion, and a sterile dressing was applied. The patient was safely transferred to hospital bed and awakened from the anesthetic and taken to the PACU for recovery. All instrument, needle and lap counts were correct in the case in accordance with hospital protocol    Postoperative Plan:  Batsheva Fuentes will be return to the hospital were postoperatively. They will be weight-bearing as tolerated and will receive a physical therapy regimen. The pain will be controlled with a combination of IV and oral pain medicine. DVT prophylaxis will be Aspirin    Implants:  [place] gamma 4 short nail

## 2025-06-17 ENCOUNTER — APPOINTMENT (OUTPATIENT)
Dept: PHYSICAL THERAPY | Facility: CLINIC | Age: 80
End: 2025-06-17
Attending: PHYSICIAN ASSISTANT
Payer: COMMERCIAL

## 2025-06-17 LAB
ANION GAP SERPL CALCULATED.3IONS-SCNC: 10 MMOL/L (ref 7–15)
BASOPHILS # BLD AUTO: 0 10E3/UL (ref 0–0.2)
BASOPHILS NFR BLD AUTO: 0 %
BUN SERPL-MCNC: 30.9 MG/DL (ref 8–23)
CALCIUM SERPL-MCNC: 8.4 MG/DL (ref 8.8–10.4)
CHLORIDE SERPL-SCNC: 97 MMOL/L (ref 98–107)
CREAT SERPL-MCNC: 0.75 MG/DL (ref 0.51–0.95)
EGFRCR SERPLBLD CKD-EPI 2021: 80 ML/MIN/1.73M2
EOSINOPHIL # BLD AUTO: 0 10E3/UL (ref 0–0.7)
EOSINOPHIL NFR BLD AUTO: 0 %
ERYTHROCYTE [DISTWIDTH] IN BLOOD BY AUTOMATED COUNT: 13.7 % (ref 10–15)
GLUCOSE BLDC GLUCOMTR-MCNC: 125 MG/DL (ref 70–99)
GLUCOSE BLDC GLUCOMTR-MCNC: 162 MG/DL (ref 70–99)
GLUCOSE SERPL-MCNC: 124 MG/DL (ref 70–99)
HCO3 SERPL-SCNC: 22 MMOL/L (ref 22–29)
HCT VFR BLD AUTO: 26.4 % (ref 35–47)
HGB BLD-MCNC: 8.2 G/DL (ref 11.7–15.7)
HGB BLD-MCNC: 8.7 G/DL (ref 11.7–15.7)
IMM GRANULOCYTES # BLD: 0.1 10E3/UL
IMM GRANULOCYTES NFR BLD: 1 %
LYMPHOCYTES # BLD AUTO: 1.2 10E3/UL (ref 0.8–5.3)
LYMPHOCYTES NFR BLD AUTO: 11 %
MCH RBC QN AUTO: 29.6 PG (ref 26.5–33)
MCHC RBC AUTO-ENTMCNC: 33 G/DL (ref 31.5–36.5)
MCV RBC AUTO: 90 FL (ref 78–100)
MCV RBC AUTO: 90 FL (ref 78–100)
MONOCYTES # BLD AUTO: 1.3 10E3/UL (ref 0–1.3)
MONOCYTES NFR BLD AUTO: 12 %
NEUTROPHILS # BLD AUTO: 8.5 10E3/UL (ref 1.6–8.3)
NEUTROPHILS NFR BLD AUTO: 77 %
NRBC # BLD AUTO: 0 10E3/UL
NRBC BLD AUTO-RTO: 0 /100
PLATELET # BLD AUTO: 249 10E3/UL (ref 150–450)
POTASSIUM SERPL-SCNC: 4.1 MMOL/L (ref 3.4–5.3)
RBC # BLD AUTO: 2.94 10E6/UL (ref 3.8–5.2)
SODIUM SERPL-SCNC: 127 MMOL/L (ref 135–145)
SODIUM SERPL-SCNC: 129 MMOL/L (ref 135–145)
SODIUM SERPL-SCNC: 129 MMOL/L (ref 135–145)
SODIUM SERPL-SCNC: 132 MMOL/L (ref 135–145)
WBC # BLD AUTO: 11 10E3/UL (ref 4–11)

## 2025-06-17 PROCEDURE — 250N000013 HC RX MED GY IP 250 OP 250 PS 637: Performed by: PHYSICIAN ASSISTANT

## 2025-06-17 PROCEDURE — 99233 SBSQ HOSP IP/OBS HIGH 50: CPT | Performed by: HOSPITALIST

## 2025-06-17 PROCEDURE — 85018 HEMOGLOBIN: CPT | Performed by: HOSPITALIST

## 2025-06-17 PROCEDURE — 250N000013 HC RX MED GY IP 250 OP 250 PS 637

## 2025-06-17 PROCEDURE — 84295 ASSAY OF SERUM SODIUM: CPT | Performed by: HOSPITALIST

## 2025-06-17 PROCEDURE — 97530 THERAPEUTIC ACTIVITIES: CPT | Mod: GP | Performed by: PHYSICAL THERAPIST

## 2025-06-17 PROCEDURE — 36415 COLL VENOUS BLD VENIPUNCTURE: CPT | Performed by: HOSPITALIST

## 2025-06-17 PROCEDURE — 84295 ASSAY OF SERUM SODIUM: CPT | Performed by: STUDENT IN AN ORGANIZED HEALTH CARE EDUCATION/TRAINING PROGRAM

## 2025-06-17 PROCEDURE — 36415 COLL VENOUS BLD VENIPUNCTURE: CPT | Performed by: STUDENT IN AN ORGANIZED HEALTH CARE EDUCATION/TRAINING PROGRAM

## 2025-06-17 PROCEDURE — 85025 COMPLETE CBC W/AUTO DIFF WBC: CPT | Performed by: PHYSICIAN ASSISTANT

## 2025-06-17 PROCEDURE — 84295 ASSAY OF SERUM SODIUM: CPT | Performed by: PHYSICIAN ASSISTANT

## 2025-06-17 PROCEDURE — 250N000011 HC RX IP 250 OP 636

## 2025-06-17 PROCEDURE — 97161 PT EVAL LOW COMPLEX 20 MIN: CPT | Mod: GP | Performed by: PHYSICAL THERAPIST

## 2025-06-17 PROCEDURE — 80048 BASIC METABOLIC PNL TOTAL CA: CPT | Performed by: PHYSICIAN ASSISTANT

## 2025-06-17 PROCEDURE — 36415 COLL VENOUS BLD VENIPUNCTURE: CPT | Performed by: PHYSICIAN ASSISTANT

## 2025-06-17 PROCEDURE — 250N000013 HC RX MED GY IP 250 OP 250 PS 637: Performed by: HOSPITALIST

## 2025-06-17 PROCEDURE — 99232 SBSQ HOSP IP/OBS MODERATE 35: CPT | Performed by: STUDENT IN AN ORGANIZED HEALTH CARE EDUCATION/TRAINING PROGRAM

## 2025-06-17 PROCEDURE — 120N000001 HC R&B MED SURG/OB

## 2025-06-17 PROCEDURE — 999N000111 HC STATISTIC OT IP EVAL DEFER

## 2025-06-17 PROCEDURE — 250N000013 HC RX MED GY IP 250 OP 250 PS 637: Performed by: STUDENT IN AN ORGANIZED HEALTH CARE EDUCATION/TRAINING PROGRAM

## 2025-06-17 RX ORDER — METHOCARBAMOL 500 MG/1
250 TABLET ORAL 3 TIMES DAILY PRN
Status: DISCONTINUED | OUTPATIENT
Start: 2025-06-17 | End: 2025-06-23 | Stop reason: HOSPADM

## 2025-06-17 RX ORDER — LOSARTAN POTASSIUM 50 MG/1
50 TABLET ORAL DAILY
Status: DISCONTINUED | OUTPATIENT
Start: 2025-06-17 | End: 2025-06-23 | Stop reason: HOSPADM

## 2025-06-17 RX ORDER — FAMOTIDINE 20 MG/1
20 TABLET, FILM COATED ORAL DAILY
Status: DISCONTINUED | OUTPATIENT
Start: 2025-06-18 | End: 2025-06-23 | Stop reason: HOSPADM

## 2025-06-17 RX ADMIN — ASPIRIN 81 MG: 81 TABLET, DELAYED RELEASE ORAL at 21:33

## 2025-06-17 RX ADMIN — SENNOSIDES AND DOCUSATE SODIUM 1 TABLET: 50; 8.6 TABLET ORAL at 21:33

## 2025-06-17 RX ADMIN — Medication 1 G: at 09:53

## 2025-06-17 RX ADMIN — LOSARTAN POTASSIUM 50 MG: 50 TABLET, FILM COATED ORAL at 17:18

## 2025-06-17 RX ADMIN — CEFAZOLIN 1 G: 1 INJECTION, POWDER, FOR SOLUTION INTRAMUSCULAR; INTRAVENOUS at 00:18

## 2025-06-17 RX ADMIN — CEFAZOLIN 1 G: 1 INJECTION, POWDER, FOR SOLUTION INTRAMUSCULAR; INTRAVENOUS at 09:53

## 2025-06-17 RX ADMIN — LEVOTHYROXINE SODIUM 37.5 MCG: 75 TABLET ORAL at 09:53

## 2025-06-17 RX ADMIN — ACETAMINOPHEN 975 MG: 325 TABLET, FILM COATED ORAL at 06:29

## 2025-06-17 RX ADMIN — ACETAMINOPHEN 975 MG: 325 TABLET, FILM COATED ORAL at 14:03

## 2025-06-17 RX ADMIN — Medication 15 G: at 01:15

## 2025-06-17 RX ADMIN — Medication 1 G: at 18:28

## 2025-06-17 RX ADMIN — ACETAMINOPHEN 975 MG: 325 TABLET, FILM COATED ORAL at 21:33

## 2025-06-17 RX ADMIN — OXYCODONE HYDROCHLORIDE 5 MG: 5 TABLET ORAL at 06:29

## 2025-06-17 RX ADMIN — ASPIRIN 81 MG: 81 TABLET, DELAYED RELEASE ORAL at 09:53

## 2025-06-17 RX ADMIN — SENNOSIDES AND DOCUSATE SODIUM 1 TABLET: 50; 8.6 TABLET ORAL at 09:53

## 2025-06-17 RX ADMIN — POLYETHYLENE GLYCOL 3350 17 G: 17 POWDER, FOR SOLUTION ORAL at 09:53

## 2025-06-17 ASSESSMENT — ACTIVITIES OF DAILY LIVING (ADL)
ADLS_ACUITY_SCORE: 51
ADLS_ACUITY_SCORE: 51
ADLS_ACUITY_SCORE: 65
ADLS_ACUITY_SCORE: 51
ADLS_ACUITY_SCORE: 54
ADLS_ACUITY_SCORE: 69
ADLS_ACUITY_SCORE: 51
ADLS_ACUITY_SCORE: 65
ADLS_ACUITY_SCORE: 54
ADLS_ACUITY_SCORE: 51
ADLS_ACUITY_SCORE: 54
ADLS_ACUITY_SCORE: 51
ADLS_ACUITY_SCORE: 54
ADLS_ACUITY_SCORE: 51
ADLS_ACUITY_SCORE: 65

## 2025-06-17 NOTE — PROGRESS NOTES
MD Notification    Notified Person: MD    Notified Person Name: Tyler Ellington     Notification Date/Time: now     Notification Interaction: vocera     Purpose of Notification: sodium lab results.     Orders Received: urea packet, q6 NA lab check.     Comments:   Paging per order. pt q4 NA lab results. lab results of NA at 22:57 was 128. Thank you.

## 2025-06-17 NOTE — CARE PLAN
Patient is alert and oriented x 2 , disoriented to time , situations and place, blood pressure was elevated scheduled Losartan was given , up with 2 assist gbw ,incontinent of bowel and bladder,patient voided 300 ml with external cath.she was turned and reposition q 2 hrs. Sodium was replaced. Bladder scan 252 ml .

## 2025-06-17 NOTE — PLAN OF CARE
OT: Order received, chart reviewed and discussed with care team. Per PT, patient will benefit from continued therapy at TCU setting due to current level of assist with all mobility. PT will continue to address deficits during inpatient stay. Defer discharge recommendations to PT and care team. Will complete OT orders and defer to next level of care.

## 2025-06-17 NOTE — PROGRESS NOTES
"MD Notification    Notified Person: MD    Notified Person Name: Day Hill    Notification Date/Time:  6/16/2025. 8:36 PM    Notification Interaction: sarah.     Purpose of Notification: NA result     Orders Received: \"OK, no changes to current plans\".     Comments: RN stated,   \"Paging per order, pt just got back from pacu, POD 0, R hip ORIF. NA check post surgery was 128 @ 19:52PM. Please advise. Thank you\".     "

## 2025-06-17 NOTE — PLAN OF CARE
Goal Outcome Evaluation:      Plan of Care Reviewed With: patient    Shift Summary [id number]    Admitting Diagnosis: Closed displaced intertrochanteric fracture of right femur, initial encounter (H) [S72.141A]  Hip fracture, right, closed, initial encounter (H) [S72.001A]      POD#:1 ORIF of R hip.   Mental Status: A/Ox3, disoriented to time.   Activity/dangle: not oob yet, unable to get oob due to pain when tried this morning. Pt did sat and dangle at edge of bed this morning.   Diet: regular, FR 1200.   Pain: 7-8/10, managed with prn oxy, IV dilaudid, and scheduled meds.   Izaguirre/Voiding: pure wick in place.   Tele/Restraints/Iso: NA.   02/LDA: RA. L PIV: LR running @ 50ml/hr.   D/C Date: TBD.     Other Info: dressing on R hip, CDI. Bladder scan 214 @ 0626 AM. NA lab result at 129 @ 0612 AM today.     Temp: 99.3  F (37.4  C) Temp src: Oral BP: 137/83 Pulse: 89   Resp: 16 SpO2: 96 % O2 Device: None (Room air)

## 2025-06-17 NOTE — PROGRESS NOTES
Orthopedic Surgery  Batsheva Fuentes  06/17/2025     Admit Date:  6/15/2025  Assessment:   POD: 1 Day Post-Op   Procedure(s):  OPEN REDUCTION INTERNAL FIXATION RIGHT HIP FRACTURE USING INTRAMEDULLARY NAIL     Patient resting comfortably in bed, her  is with her    Pain controlled with medications, otherwise she is sore  Tolerating oral intake  Denies nausea or vomiting  Denies chest pain or shortness of breath    Temp:  [98  F (36.7  C)-99.3  F (37.4  C)] 99.3  F (37.4  C)  Pulse:  [76-95] 89  Resp:  [10-17] 16  BP: (137-174)/(83-98) 137/83  SpO2:  [96 %-100 %] 96 %    Alert, answers questions appropriately  Dressing is clean, dry, and intact   Minimal erythema of the surrounding skin   Bilateral calves are soft, non-tender  Right lower extremity is NVI  Sensation intact bilateral lower extremities  Patient able to resist dorsi and plantar flexion bilaterally  Toes are warm to touch    Labs:  Recent Labs   Lab Test 06/17/25  0612 06/16/25  0540 06/15/25  1947   WBC 11.0 13.9* 28.3*   HGB 8.7* 11.1* 12.1    282 341     Recent Labs   Lab Test 06/15/25  1516   INR 1.03         Plan:   ASA 81 mg BID for DVT prophylaxis     Mobilize with PT/OT    WBAT RLE     Continue current pain regimen   Dressings: Keep intact.  Change if >60% saturated or peeling off    Follow-up: 2 weeks post-op with Kassy/Dr Mccullough's team   Social work coordinating discharge planning    Disposition:    Anticipate d/c to be determined pending therapy evaluations    Brooke Borjas PA-C  [facility]

## 2025-06-17 NOTE — PROGRESS NOTES
[facility]  Hospitalist Progress Note   06/17/2025          Assessment and Plan:       Batsheva Fuentes is a 79 year old female with below PMHx admitted on 6/15/2025 after sustaining a mechanical fall at home with subsequent R femur fracture. Admitted for Orthopedic surgery. Of note, pt currently enrolled in hospice care with [facility] for her hx of frontotemporal dementia.   --Discussed with patient's family, would like to continue medical care.    Status post open reduction internal fixation right hip using intramedullary nail on 6/16/2025  Mechanical fall with R femur fracture.   *XR shows acute mildly displaced and angulated intertrochanteric fracture of the right femur with moderate resulting varus angulation.   *CT head, cervical spine negative for acute pathology.   -Status post open reduction internal fixation right hip using intramedullary nail on 6/16/2025  -- Orthopedic surgery following, will defer postoperative care including pharmacological DVT prophylaxis, rehabilitation to orthopedic surgery.  On aspirin 81 mg twice daily for DVT prophylaxis.  Orthopedic surgery recommending weightbearing as tolerated right lower extremity.  Scheduled Tylenol 3 times daily for pain.  Atarax as needed for adjuvant pain.  Robaxin as needed for muscle spasms.  oxycodone 2.5-5 mg q4 hrs PRN,   IV dilaudid 0.2-0.4 mg q2 hrs PRN for pain not responding to a bowel regimen.  Bowel regimen while on narcotics.  Rehab team following.       Hyponatremia, SIADH:   Sodium 128 on admission. S/P ~600-700 ccs of mIVF with 0.9% NS in the ED. No hx of hyponatremia noted. Good appetite reported by pt's . No excessive water consumption. No evidence of volume overload. Pt is on Lexapro with last dose 6/15 AM prior to presentation   *Osm 273, urine sodium 120, urine osm 530 consistent with SIADH. ? Pain mediated given fracture.   *TSH 18.33, T4 1.59.   *2% at 30 ccs/hr initiated early AM 6/16, increased to  50 ccs/hr 6/16 AM due to stable hyponatremia   -- now off IV fluids, sodium this morning 129.  Nephrology following, appreciate comanagement.  Continue 1200 ml fluid restriction   - Hold Lexapro (note recent dose adjustment from 10mg>15 mg > 20 mg over the past month)  - Continue salt tabs 1 g BID  - Telemetry   - Seizure precautions.  Fall precautions.  Monitor sodium levels twice daily or earlier if symptomatic    Acute anemia postoperative.  Preop hemoglobin in the 12 range.  Now dropped to 8.7  No active bleeding.  Documented  mL.  Monitor hemoglobin later this evening, earlier if symptomatic.  Transfuse for hemoglobin less than 7 or symptomatic.     Leukocytosis, improving:   WBC 28.3, ANC 25 on admission. No infectious sx reported on admission. No open wounds or sores. No recent steroids. Afebrile. Baseline WBC normal when checked a couple years prior.   UA unremarkable. No known hx of MDS. ? Reactive in the setting of stress from fracture, but seems a bit high for that. CXR negative for evidence of infection. Repeat WBC 13.9. procal 0.58 (elevation in the setting of trauma from fall above?).  --On 6/17 WBC count within normal limits.  Hold antibiotics, trend fever curve    Elevated proBNP of 1857.  Elevated bilirubin of 1.4.  --Patient with hyponatremia, elevated proBNP, bilirubin.  Discussed with patient's  in agreement with echocardiogram For evaluation of heart function.  Discontinue IV fluids.  Monitor volume status closely.    Hypertension: Uncontrolled on admission in the setting of pain. Improving   Resume PTA losartan.  As needed hydralazine ordered.     Abnormal TFTs   Hx of hypothyroidism: TSH 18.33, T4 1.59. Pt maintained on Synthroid  37.5 mcg daily PTA, but was decreased from 75 mcg ~1 month PTA upon enrollment in hospice. TSH 2.50 and T4 1.52 in 1/2025.   - Continue Synthroid at current dose, suspect stress demargination from above. Repeat TFTs in 4-6 weeks.      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 Rouse of Neurology 4/10/25 and Dr. Collado of Psychiatry  Holding PTA Lexapro as above.  Closely monitor mental status.  Minimize narcotic use as able to.  Fall precautions, delirium precautions     Chronic, stable medical conditions   Diverticulitis s/p colectomy   Cerebral aneurysm, SAH  Polymyalgia rheumatica   Anxiety   Depression   Tardive akathisia, improved with Risperdone      Physical deconditioning from medical illness.  Rehab team evaluation.  Care management assisting with transition plans    Clinically Significant Risk Factors         # Hyponatremia: Lowest Na = 126 mmol/L in last 2 days, will monitor as appropriate  # Hypochloremia: Lowest Cl = 93 mmol/L in last 2 days, will monitor as appropriate  # Hypocalcemia: Lowest Ca = 8.4 mg/dL in last 2 days, will monitor and replace as appropriate         # Hypertension: Noted on problem list            # Cachexia: Estimated body mass index is 15.54 kg/m  as calculated from the following:    Height as of this encounter: 1.702 m (5' 7\").    Weight as of this encounter: 45 kg (99 lb 3.3 oz)., PRESENT ON ADMISSION                Orders Placed This Encounter      Advance Diet as Tolerated: Regular Diet Adult      DVT Prophylaxis: SCDs, on aspirin for DVT prophylaxis  Code Status: No CPR- Do NOT Intubate  Disposition: Expected discharge in 2 days pending clinical improvement    Medically Ready for Discharge: Anticipated in 2-4 Days       Discussed with patient,  by the bedside, bedside RN, care team.    Total time greater than 51 minutes.  More than 70% of time spent in direct patient care, care coordination, patient counseling, and formalizing plan of care.        Sis Tay MD        Interval History:      Patient lying in bed.  Awake, can answer simple questions.  Seems slightly disoriented.   by bedside.  Denies any chest " pain.  Denies any palpitations.  Denies any abdominal pain.  Reports has not had a bowel movement yet.  Passing urine, PureWick in place.  Receiving IV fluids, noted sodium of 129 this morning.  Per report has not been out of bed this morning.  Per report ongoing pain at surgical site, received oxycodone, as needed Dilaudid and scheduled Tylenol         Physical Exam:        Physical Exam   Temp:  [98  F (36.7  C)-99.3  F (37.4  C)] 99.3  F (37.4  C)  Pulse:  [83-95] 90  Resp:  [10-16] 12  BP: (129-174)/(83-98) 149/86  SpO2:  [96 %-100 %] 98 %    Intake/Output Summary (Last 24 hours) at 6/17/2025 1600  Last data filed at 6/17/2025 1451  Gross per 24 hour   Intake 740 ml   Output 550 ml   Net 190 ml       Admission Weight: 45 kg (99 lb 3.3 oz)  Current Weight: 45 kg (99 lb 3.3 oz)    PHYSICAL EXAM  GENERAL: Patient is in no distress. Alert and oriented.  HEENT: Oropharynx pink  HEART: Regular rate and rhythm. S1S2.   LUNGS: Bilateral slightly decreased breath sounds, no wheezing or crackles  Respirations unlabored  ABDOMEN: Soft, no abdominal tenderness, bowel sounds heard   NEURO: Moving all extremities  EXTREMITIES: No pedal edema.  Surgical site dressing intact.  SKIN: Warm, dry.  PSYCHIATRY Cooperative       Medications:        Current Facility-Administered Medications   Medication Dose Route Frequency Provider Last Rate Last Admin    acetaminophen (TYLENOL) tablet 975 mg  975 mg Oral Q8H Kassy Loya PA-C   975 mg at 06/17/25 1403    aspirin EC tablet 81 mg  81 mg Oral BID Kassy Loya PA-C   81 mg at 06/17/25 0953    [Held by provider] escitalopram (LEXAPRO) tablet 20 mg  20 mg Oral Daily Kay Tam PA-C        [START ON 6/18/2025] famotidine (PEPCID) tablet 20 mg  20 mg Oral Daily Sean Miles MD        levothyroxine (SYNTHROID/LEVOTHROID) half-tab 37.5 mcg  37.5 mcg Oral Daily Kay Tam PA-C   37.5 mcg at 06/17/25 0953    [Held by provider] losartan (COZAAR)  tablet 50 mg  50 mg Oral Daily Kay Tam PA-C        polyethylene glycol (MIRALAX) Packet 17 g  17 g Oral Daily Kassy Loya PA-C   17 g at 06/17/25 0953    senna-docusate (SENOKOT-S/PERICOLACE) 8.6-50 MG per tablet 1 tablet  1 tablet Oral BID Kassy Loya PA-C   1 tablet at 06/17/25 0953    sodium chloride (PF) 0.9% PF flush 3 mL  3 mL Intracatheter Q8H Kassy Loya PA-C   3 mL at 06/16/25 2029    sodium chloride (PF) 0.9% PF flush 3 mL  3 mL Intracatheter Q8H LILLIE Kay Tam PA-C   3 mL at 06/16/25 2108    sodium chloride tablet 1 g  1 g Oral BID w/meals Kay Tam PA-C   1 g at 06/17/25 0953     Current Facility-Administered Medications   Medication Dose Route Frequency Provider Last Rate Last Admin    benzocaine-menthol (CHLORASEPTIC) 6-10 MG lozenge 1 lozenge  1 lozenge Buccal Q1H PRN Kassy Loya PA-C        bisacodyl (DULCOLAX) suppository 10 mg  10 mg Rectal Daily PRN Kassy Loya PA-C        calcium carbonate (TUMS) chewable tablet 1,000 mg  1,000 mg Oral 4x Daily PRN Kay Tam PA-C        hydrALAZINE (APRESOLINE) injection 10 mg  10 mg Intravenous Q4H PRN Kay Tam PA-C   10 mg at 06/15/25 1803    HYDROmorphone (DILAUDID) injection 0.1 mg  0.1 mg Intravenous Q4H PRN Kassy Loya PA-C        Or    HYDROmorphone (DILAUDID) injection 0.2 mg  0.2 mg Intravenous Q4H PRN Kassy Loya PA-C   0.2 mg at 06/16/25 2108    hydrOXYzine HCl (ATARAX) tablet 10 mg  10 mg Oral Q6H PRN Kassy Loya PA-C   10 mg at 06/16/25 2300    lidocaine (LMX4) cream   Topical Q1H PRN Kassy Loya PA-C        lidocaine (LMX4) cream   Topical Q1H PRN Kay Tam PA-C        lidocaine 1 % 0.1-1 mL  0.1-1 mL Other Q1H PRN Kassy Loya PA-C        lidocaine 1 % 0.1-1 mL  0.1-1 mL Other Q1H PRN Kay Tam PA-C        magnesium hydroxide (MILK OF MAGNESIA) suspension 30 mL  30 mL Oral Daily PRN Kassy Loya,  SEE        methocarbamol (ROBAXIN) half-tab 250 mg  250 mg Oral TID PRN Sis Tay MD        naloxone (NARCAN) injection 0.2 mg  0.2 mg Intravenous Q2 Min PRN Sean Miles MD        Or    naloxone (NARCAN) injection 0.4 mg  0.4 mg Intravenous Q2 Min PRN Sean Miles MD        Or    naloxone (NARCAN) injection 0.2 mg  0.2 mg Intramuscular Q2 Min PRN Sean Miles MD        Or    naloxone (NARCAN) injection 0.4 mg  0.4 mg Intramuscular Q2 Min PRN Sean Miles MD        ondansetron (ZOFRAN ODT) ODT tab 4 mg  4 mg Oral Q6H PRN Kassy Loya PA-C        Or    ondansetron (ZOFRAN) injection 4 mg  4 mg Intravenous Q6H PRN Kassy Loya PA-C        oxyCODONE IR (ROXICODONE) half-tab 2.5 mg  2.5 mg Oral Q4H PRN Kassy Loya PA-C        Or    oxyCODONE (ROXICODONE) tablet 5 mg  5 mg Oral Q4H PRN Kassy Loya PA-C   5 mg at 06/17/25 0629    polyethylene glycol (MIRALAX) Packet 17 g  17 g Oral BID PRN Kay Tam PA-C        prochlorperazine (COMPAZINE) injection 5 mg  5 mg Intravenous Q6H PRN Kassy Loya PA-C        Or    prochlorperazine (COMPAZINE) tablet 5 mg  5 mg Oral Q6H PRN Kassy Loya PA-C        senna-docusate (SENOKOT-S/PERICOLACE) 8.6-50 MG per tablet 1 tablet  1 tablet Oral BID PRN Kay Tam PA-C        Or    senna-docusate (SENOKOT-S/PERICOLACE) 8.6-50 MG per tablet 2 tablet  2 tablet Oral BID PRN Kay Tam PA-C        sodium chloride (PF) 0.9% PF flush 3 mL  3 mL Intracatheter q1 min prn Kassy Loya PA-C        sodium chloride (PF) 0.9% PF flush 3 mL  3 mL Intracatheter q1 min Kay Langley PA-C                Data:      All new lab and imaging data was reviewed.

## 2025-06-17 NOTE — PLAN OF CARE
Goal Outcome Evaluation:       Shift Summary [id number]    Admitting Diagnosis: Closed displaced intertrochanteric fracture of right femur, initial encounter (H) [S72.141A]  Hip fracture, right, closed, initial encounter (H) [S72.001A]   Vitals WNL  Pain 1/10 PainID score appears comfortable and is resting in between cares. Got up to the chair with PT today.   A&Ox3 disoriented to time  Voiding ex cath is in place, pt   Mobility AO1-2 GBW  Tele NSR  CMS intact  Lung Sounds diminished on RA  GI WNL  Dressing CDI    Orders Placed This Encounter      Advance Diet as Tolerated: Regular Diet Adult       Plan: discharge pending. FR of 1200ml. Na has been stable this morning, frequent checks discontinued. Recs are for TCU family aware and SW is following.    -pt with no void since surgery bladder scans have been <500ml. Pt on fluid restriction and not drinking much already, also has poor appetite.  in the room assisting her with cares, encouraged him to push fluids/food as able and that staff will also assist with this.

## 2025-06-17 NOTE — CONSULTS
Care Management Initial Consult    General Information  Assessment completed with: Spouse or significant other,    Type of CM/SW Visit: Initial Assessment    Primary Care Provider verified and updated as needed:     Readmission within the last 30 days: unable to assess      Reason for Consult: discharge planning  Advance Care Planning:            Communication Assessment  Patient's communication style: spoken language (English or Bilingual)             Cognitive  Cognitive/Neuro/Behavioral: .WDL except  Level of Consciousness: intermittent confusion, alert  Arousal Level: opens eyes spontaneously  Orientation: disoriented to, time  Mood/Behavior: calm, cooperative  Best Language: 0 - No aphasia  Speech: clear    Living Environment:   People in home: spouse     Current living Arrangements: house      Able to return to prior arrangements: no       Family/Social Support:  Care provided by:  (pt's  helps as needed)  Provides care for: no one  Marital Status:   Support system:            Description of Support System:           Current Resources:   Patient receiving home care services: No        Community Resources: Hospice (Pt was on hospice but is no longer on hospice now.)  Equipment currently used at home:  (has a walker that she used as needed)  Supplies currently used at home:      Employment/Financial:  Employment Status: retired        Financial Concerns:             Does the patient's insurance plan have a 3 day qualifying hospital stay waiver?  No    Lifestyle & Psychosocial Needs:  Social Drivers of Health     Food Insecurity: Not on file   Depression: At risk (9/25/2024)    Received from [facility]    PHQ-2     PHQ-2 TOTAL SCORE: 5   Housing Stability: Not on file   Tobacco Use: Low Risk  (6/17/2025)    Patient History     Smoking Tobacco Use: Never     Smokeless Tobacco Use: Never     Passive Exposure: Not on file   Financial Resource Strain: Not on file    Alcohol Use: Not At Risk (12/28/2020)    AUDIT-C     Frequency of Alcohol Consumption: Never     Average Number of Drinks: Not on file     Frequency of Binge Drinking: Not on file   Transportation Needs: Not on file   Physical Activity: Not on file   Interpersonal Safety: Not on file   Stress: Not on file   Social Connections: Unknown (1/16/2023)    Received from [facility]    Social Connections     Frequency of Communication with Friends and Family: Not on file   Health Literacy: Not on file       Functional Status:  Prior to admission patient needed assistance:   Dependent ADLs::  (Pt's  states that prior to hospice pt was completely independent with mobility and most things. he states he did help her with bathing and dressing but otherwise pt was independent prior to hospice.)  Dependent IADLs::  (Pt's  states that prior to hospice pt was completely independent with mobility and most things. he states he did help her with bathing and dressing but otherwise pt was independent prior to hospice.)       Mental Health Status:          Chemical Dependency Status:                Values/Beliefs:  Spiritual, Cultural Beliefs, Rastafari Practices, Values that affect care:                 Discussed  Partnership in Safe Discharge Planning  document with patient/family: No    Additional Information:  Patient is a 79 year old female who was admitted to the hospital with concerns of a fall per Doctor White note on 6/15/25.     Writer met with pt at bedside. Pt is asleep. Pt's  is present. Pt's  states they live in a rambler home. He states pt was recently on hospice but they are no longer going the hospice route. Pt's  states that prior to hospice pt was independent in mostly all ADL's and I'adls. He states that he did supervise her while she bathed to ensure no falls, and he helped her with her medications. Pt's  states that prior to hospice pt was  walking around for hours every single day with no walker or anything. He states he is interested in having pt attend TCU as that was what was recommended by PT. He would like referrals in [facility] with olivia as preference. Pt's  did ask about insurance coverage. Writer informed him that care management will have to complete a prior authorization for tcu. Writer informed pt's  that if prior authorization is approve that is a good thing but writer cannot guarantee insurance coverage for tcu stay cost as writer is not pt's insurance company. Pt's  states understanding. He states no further questions or concerns at this time.    Writer sent referral to tcu's.    Next Steps: awaiting medical stability; will need accepting tcu.     Irene Hart, LISSETHW  Social Work  [facility]

## 2025-06-17 NOTE — PROGRESS NOTES
Renal Medicine Progress Note            Assessment/Plan:     Assessment: Batsheva Fuentes is a 79 year old female with PMH frontotemporal dementia, depression, HTN, tardive dyskinesia who was admitted on 6/15/2025 with R femur fracture after mechanical fall.     SIADH   Hyponatremia, improved   Na 128 on presentation (baseline Na ~133) in setting of lexapro use with recent dose increase, hip fracture/pain. Na worsened to 126 after NS bolus. Urine Na 120, urine Osm 530 consistent with SIADH. TSH notably quite high but thought to be related to stress from fx. Suspect SIADH due to lexapro dose increase and pain from fx. Required hypertonic saline temporarily to facilitate safe rae-op plan.   - repeat Na 130, ok to proceed with surgery   - can discontinue hypertonic saline, repeat Na post-op   - continue 1.2L FR for now, if Na normalizes then can liberalize further    - lexapro currently held, can consider resuming lower dose 10 mg tomorrow   - no further Na checks needed today.   - continue sodium chloride 1 g BID for 2 more doses, then can discontinue unless hyponatremia persists     Mechanical fall   R Femur fracture  Fall at home, R femur fracture, s/p  repair 6/16.     Plan/Recs:  - two more doses sodium chloride   - no further urena   - no further Na checks needed today   - continue 1.2L FR for now, liberalize once Na normal   - lexapro held, can resume lower dose 10 mg tomorrow if Na stable   - nephrology will sign off at this time, please page if further questions arise.       Reviewed notes from Dr. Ellington/Kay Tam PA-C (hospitalist), Dr. Mccullough (ortho).       Jyoti Oden MD   [facility]  Office: [phone]          Interval History:      Na stable overnight, received dose of urena   Ate breakfast, feels appetite is normal   Following fluid restriction   Denies n/v   Denies dizziness   Pain well controlled   No other complaints    updated at bedside            Medications  "and Allergies:     Current Facility-Administered Medications   Medication Dose Route Frequency Provider Last Rate Last Admin    acetaminophen (TYLENOL) tablet 975 mg  975 mg Oral Q8H Kassy Loya PA-C   975 mg at 06/17/25 0629    aspirin EC tablet 81 mg  81 mg Oral BID Kassy Loya PA-C   81 mg at 06/17/25 0953    [Held by provider] escitalopram (LEXAPRO) tablet 20 mg  20 mg Oral Daily Kay Tam PA-C        [START ON 6/18/2025] famotidine (PEPCID) tablet 20 mg  20 mg Oral Daily Sean Miles MD        Or    [START ON 6/18/2025] famotidine (PEPCID) injection 20 mg  20 mg Intravenous Daily Sean Miles MD        levothyroxine (SYNTHROID/LEVOTHROID) half-tab 37.5 mcg  37.5 mcg Oral Daily Kay Tam PA-C   37.5 mcg at 06/17/25 0953    [Held by provider] losartan (COZAAR) tablet 50 mg  50 mg Oral Daily Kay Tam PA-C        polyethylene glycol (MIRALAX) Packet 17 g  17 g Oral Daily Kassy Loya PA-C   17 g at 06/17/25 0953    senna-docusate (SENOKOT-S/PERICOLACE) 8.6-50 MG per tablet 1 tablet  1 tablet Oral BID Kassy Loya PA-C   1 tablet at 06/17/25 0953    sodium chloride (PF) 0.9% PF flush 3 mL  3 mL Intracatheter Q8H Kassy Loya PA-C   3 mL at 06/16/25 2029    sodium chloride (PF) 0.9% PF flush 3 mL  3 mL Intracatheter Q8H [facility] Kay Tam PA-C   3 mL at 06/16/25 2108    sodium chloride tablet 1 g  1 g Oral BID w/meals Kay Tam PA-C   1 g at 06/17/25 0953        Allergies   Allergen Reactions    Blood Transfusion Related (Informational Only) Other (See Comments)     Patient has a history of a clinically significant antibody against RBC antigens.  A delay in compatible RBCs may occur.            Physical Exam:   Vitals were reviewed  /84 (BP Location: Right arm)   Pulse 88   Temp 98.6  F (37  C) (Oral)   Resp 16   Ht 1.702 m (5' 7\")   Wt 45 kg (99 lb 3.3 oz)   SpO2 98%   BMI 15.54 kg/m      Wt " Readings from Last 3 Encounters:   06/16/25 45 kg (99 lb 3.3 oz)   03/21/21 62.9 kg (138 lb 11.2 oz)   12/28/20 61.2 kg (135 lb)       Intake/Output Summary (Last 24 hours) at 6/17/2025 1026  Last data filed at 6/16/2025 1845  Gross per 24 hour   Intake 500 ml   Output 550 ml   Net -50 ml       GENERAL APPEARANCE: NAD, frail appearing  HEENT: normocephalic, dry MM   RESP: clear  CV: RRR   EXTREMITIES/SKIN: no edema  NEURO:  alert, oriented, normal speech           Data:     BMP  Recent Labs   Lab 06/17/25  0612 06/17/25  0611 06/16/25  2257 06/16/25 1952 06/16/25  1423 06/16/25  1054 06/16/25  0540 06/15/25  1947 06/15/25  1516   *  129*  --  128* 128* 130*   < > 126*  126*   < > 128*   POTASSIUM 4.1  --   --   --   --   --  4.2  --  5.0   CHLORIDE 97*  --   --   --   --   --  93*  --  91*   ABBEY 8.4*  --   --   --   --   --  8.8  --  9.1   CO2 22  --   --   --   --   --  23  --  24   BUN 30.9*  --   --   --   --   --  17.8  --  19.9   CR 0.75  --   --   --   --   --  0.71  --  0.81   * 125*  --   --   --   --  128*  --  106*    < > = values in this interval not displayed.     CBC  Recent Labs   Lab 06/17/25  0612 06/16/25  0540 06/15/25  1947   WBC 11.0 13.9* 28.3*   HGB 8.7* 11.1* 12.1   HCT 26.4* 30.5* 35.1   MCV 90 82 85    282 341     Lab Results   Component Value Date    AST 37 06/16/2025    ALT 20 06/16/2025    ALKPHOS 58 06/16/2025    BILITOTAL 1.4 (H) 06/16/2025    BILICONJ 0.0 02/06/2005     Lab Results   Component Value Date    INR 1.03 06/15/2025     Color Urine (no units)   Date Value   06/15/2025 Light Yellow   03/21/2021 Orange     Appearance Urine (no units)   Date Value   06/15/2025 Slightly Cloudy (A)   03/21/2021 Slightly Cloudy     Glucose Urine (mg/dL)   Date Value   06/15/2025 Negative   03/21/2021 Negative     Bilirubin Urine (no units)   Date Value   06/15/2025 Negative   03/21/2021 Negative     Ketones Urine (mg/dL)   Date Value   06/15/2025 20 (A)   03/21/2021 Negative      Specific Gravity Urine (no units)   Date Value   06/15/2025 1.015   03/21/2021 1.013     pH Urine   Date Value   06/15/2025 7.5 (H)   03/21/2021 6.0 pH     Protein Albumin Urine (mg/dL)   Date Value   06/15/2025 Negative   03/21/2021 70 (A)     Urobilinogen Urine (EU/dL)   Date Value   02/16/2005 0.2     Nitrite Urine (no units)   Date Value   06/15/2025 Negative   03/21/2021 Positive (A)     Leukocyte Esterase Urine (no units)   Date Value   06/15/2025 Negative   03/21/2021 Large (A)         Attestation:  I have reviewed today's vital signs, notes, medications, labs and imaging.    Jyoti Oden MD  [facility] - Nephrology  Office: [phone]

## 2025-06-17 NOTE — ANESTHESIA POSTPROCEDURE EVALUATION
Patient: Batsheva Fuentes    Procedure: Procedure(s):  OPEN REDUCTION INTERNAL FIXATION RIGHT HIP FRACTURE USING INTRAMEDULLARY NAIL       Anesthesia Type:  General    Note:     Postop Pain Control: Uneventful            Sign Out: Well controlled pain   PONV: No   Neuro/Psych: Uneventful            Sign Out: Acceptable/Baseline neuro status   Airway/Respiratory: Uneventful            Sign Out: Acceptable/Baseline resp. status   CV/Hemodynamics: Uneventful            Sign Out: Acceptable CV status; No obvious hypovolemia; No obvious fluid overload   Other NRE: NONE   DID A NON-ROUTINE EVENT OCCUR? No           Last vitals:  Vitals Value Taken Time   /97 06/16/25 19:15   Temp 36.8  C (98.2  F) 06/16/25 18:00   Pulse 92 06/16/25 19:22   Resp 9 06/16/25 19:22   SpO2 100 % 06/16/25 19:04   Vitals shown include unfiled device data.    Electronically Signed By: Rosas Alston MD  June 16, 2025  7:24 PM

## 2025-06-17 NOTE — PROGRESS NOTES
06/17/25 1107   Appointment Info   Signing Clinician's Name / Credentials (PT) Fátima Clement DPT   Living Environment   People in Home spouse   Current Living Arrangements house   Home Accessibility stairs to enter home;stairs within home   Number of Stairs, Main Entrance 2   Stair Railings, Main Entrance none   Number of Stairs, Within Home, Primary none   Stair Railings, Within Home, Primary none   Transportation Anticipated family or friend will provide   Living Environment Comments Pt receives assist for showering, dressing, household tasks. Pt's spouse present for session and is supportive. Pt's spouse reports pt ambulates around house approximately 4-8 hours/day.   Self-Care   Usual Activity Tolerance good   Current Activity Tolerance fair   Equipment Currently Used at Home none   Fall history within last six months yes   Number of times patient has fallen within last six months 3   Activity/Exercise/Self-Care Comment Pt owns FWW.   General Information   Onset of Illness/Injury or Date of Surgery 06/15/25   Referring Physician Sean Miles MD   Patient/Family Therapy Goals Statement (PT) Pt and pt's spouse agreeable to TCU.   Pertinent History of Current Problem (include personal factors and/or comorbidities that impact the POC) 78 y/o female admitted after a fall, noted Intertrochanteric right hip fracture. Pt now POD # 1 ORIF of R hip fracture. PMH including prior to current admission pt enrolled in hospice, behavioral variant frontotemporal dementia, right leg DVT, and hypertension.   Existing Precautions/Restrictions fall   Weight-Bearing Status - RLE weight-bearing as tolerated   General Observations Pt in supine upon arrival of therapist.   Cognition   Affect/Mental Status (Cognition) confused   Orientation Status (Cognition) oriented to;person;place   Follows Commands (Cognition) 25-49% accuracy;delayed response/completion;increased processing time needed;verbal cues/prompting  required;repetition of directions required   Pain Assessment   Patient Currently in Pain   (Pt denies pain at rest.)   Integumentary/Edema   Integumentary/Edema Comments R hip incision covered with dressing.   Posture    Posture Comments Noted forward head and shoulder posture sitting EOB and standing at FWW.   Range of Motion (ROM)   ROM Comment Limited R hip ROM d/t pain, otherwise B LEs WFL.   Strength (Manual Muscle Testing)   Strength Comments Not formally assessed, pt demonstrates B LE weakness with functional mobility.   Bed Mobility   Comment, (Bed Mobility) Supine-sit, modA.   Transfers   Comment, (Transfers) Sit <> stand with FWW and Ana.   Gait/Stairs (Locomotion)   Comment, (Gait/Stairs) Pt amb a couple steps forward with FWW and Ana, then pt impulsively attempted to sit.   Balance   Balance Comments Noted good seated and fair standing balance at FWW.   Clinical Impression   Criteria for Skilled Therapeutic Intervention Yes, treatment indicated   PT Diagnosis (PT) Difficulty with functional mobility.   Influenced by the following impairments Pain, Generalized weakness, Decreased activity tolerance, Impaired balance   Functional limitations due to impairments Limited functional mobility requiring AD and assist.   Clinical Presentation (PT Evaluation Complexity) stable   Clinical Presentation Rationale Based on PMH, current presentation, and social support.   Clinical Decision Making (Complexity) low complexity   Planned Therapy Interventions (PT) balance training;bed mobility training;cryotherapy;gait training;ROM (range of motion);stair training;strengthening;transfer training   Risk & Benefits of therapy have been explained patient   PT Total Evaluation Time   PT Eval, Low Complexity Minutes (28972) 10   Physical Therapy Goals   PT Frequency 5x/week   PT Predicted Duration/Target Date for Goal Attainment 06/20/25   PT Goals Bed Mobility;Transfers;Gait;Stairs   PT: Bed Mobility Supervision/stand-by  assist;Supine to/from sit   PT: Transfers Supervision/stand-by assist;Sit to/from stand;Assistive device   PT: Gait Supervision/stand-by assist;Rolling walker;100 feet   PT: Stairs Minimal assist;2 stairs   Interventions   Interventions Quick Adds Gait Training;Therapeutic Activity   Therapeutic Activity   Therapeutic Activities: dynamic activities to improve functional performance Minutes (10007) 26   Symptoms Noted During/After Treatment Fatigue;Increased pain   Treatment Detail/Skilled Intervention PT: Pt in supine upon arrival of therapist, pt's spouse bedside upon arrival of therapist. Pt's spouse is supportive and provides assist throughout session. Pt performed supine-sit with modA, use of reposition sheet to scoot hips towards EOB, cues provided to utilize bed railing. Noted fair sitting balance EOB, noted pt utilizes UEs to support self. Sit <> stand from EOB x 3 with FWW and Ana, cues provided for hand placement and upright posture upon standing. Pt amb a couple steps forward with FWW and Ana prior to impulsively sitting d/t report of increased R hip pain. Pt then amb a couple steps from EOB to recliner with Ana. Pt sitting up in chair at end of session, chair alarm on and needs within reach. Discussed with pt and pt's spouse current disch rec of TCU, pt and pt's spouse in agreement.   PT Discharge Planning   PT Plan Progress transfers, gait tolerance   PT Discharge Recommendation (DC Rec) Transitional Care Facility   PT Rationale for DC Rec Pt is below baseline and will benefit from continued skilled PT intervention at TCU prior to returning home with spouse. At baseline pt is active and ambulates 4-8 hours/day within home.   PT Brief overview of current status Assist of 1-2 with FWW. Goals of therapy will be to address safe mobility and make recs for d/c to next level of care. Pt and RN will continue to follow all falls risk precautions as documented by RN staff while hospitalized   PT Total Distance  Amb During Session (feet) 1   Physical Therapy Time and Intention   Timed Code Treatment Minutes 26   Total Session Time (sum of timed and untimed services) 36

## 2025-06-17 NOTE — SIGNIFICANT EVENT
Significant Event Note    Time of event: 12:55 AM June 17, 2025    Description of event:  Na 128    Plan:  Changing Na checks to q6 from q4  UreNA 15g once    Tyler Ellington MD

## 2025-06-18 ENCOUNTER — APPOINTMENT (OUTPATIENT)
Dept: PHYSICAL THERAPY | Facility: CLINIC | Age: 80
End: 2025-06-18
Payer: COMMERCIAL

## 2025-06-18 ENCOUNTER — APPOINTMENT (OUTPATIENT)
Dept: GENERAL RADIOLOGY | Facility: CLINIC | Age: 80
End: 2025-06-18
Attending: HOSPITALIST
Payer: COMMERCIAL

## 2025-06-18 ENCOUNTER — APPOINTMENT (OUTPATIENT)
Dept: CARDIOLOGY | Facility: CLINIC | Age: 80
DRG: 481 | End: 2025-06-18
Attending: HOSPITALIST
Payer: COMMERCIAL

## 2025-06-18 LAB
ALBUMIN SERPL BCG-MCNC: 3.2 G/DL (ref 3.5–5.2)
ALP SERPL-CCNC: 52 U/L (ref 40–150)
ALT SERPL W P-5'-P-CCNC: 11 U/L (ref 0–50)
ANION GAP SERPL CALCULATED.3IONS-SCNC: 9 MMOL/L (ref 7–15)
AST SERPL W P-5'-P-CCNC: 27 U/L (ref 0–45)
BILIRUB DIRECT SERPL-MCNC: 0.15 MG/DL (ref 0–0.3)
BILIRUB SERPL-MCNC: 0.5 MG/DL
BUN SERPL-MCNC: 23.5 MG/DL (ref 8–23)
CALCIUM SERPL-MCNC: 8.7 MG/DL (ref 8.8–10.4)
CHLORIDE SERPL-SCNC: 95 MMOL/L (ref 98–107)
CREAT SERPL-MCNC: 0.77 MG/DL (ref 0.51–0.95)
EGFRCR SERPLBLD CKD-EPI 2021: 77 ML/MIN/1.73M2
GLUCOSE SERPL-MCNC: 114 MG/DL (ref 70–99)
GLUCOSE SERPL-MCNC: 115 MG/DL (ref 70–99)
HCO3 SERPL-SCNC: 23 MMOL/L (ref 22–29)
HGB BLD-MCNC: 8.7 G/DL (ref 11.7–15.7)
LVEF ECHO: NORMAL
MCV RBC AUTO: 90 FL (ref 78–100)
POTASSIUM SERPL-SCNC: 4.3 MMOL/L (ref 3.4–5.3)
PROT SERPL-MCNC: 5.5 G/DL (ref 6.4–8.3)
SODIUM SERPL-SCNC: 127 MMOL/L (ref 135–145)

## 2025-06-18 PROCEDURE — 250N000013 HC RX MED GY IP 250 OP 250 PS 637

## 2025-06-18 PROCEDURE — 258N000003 HC RX IP 258 OP 636: Performed by: HOSPITALIST

## 2025-06-18 PROCEDURE — 93306 TTE W/DOPPLER COMPLETE: CPT

## 2025-06-18 PROCEDURE — 97116 GAIT TRAINING THERAPY: CPT | Mod: GP | Performed by: PHYSICAL THERAPIST

## 2025-06-18 PROCEDURE — 82248 BILIRUBIN DIRECT: CPT | Performed by: HOSPITALIST

## 2025-06-18 PROCEDURE — 97530 THERAPEUTIC ACTIVITIES: CPT | Mod: GP | Performed by: PHYSICAL THERAPIST

## 2025-06-18 PROCEDURE — 82374 ASSAY BLOOD CARBON DIOXIDE: CPT | Performed by: HOSPITALIST

## 2025-06-18 PROCEDURE — 82947 ASSAY GLUCOSE BLOOD QUANT: CPT | Performed by: HOSPITALIST

## 2025-06-18 PROCEDURE — 250N000011 HC RX IP 250 OP 636

## 2025-06-18 PROCEDURE — 250N000013 HC RX MED GY IP 250 OP 250 PS 637: Performed by: HOSPITALIST

## 2025-06-18 PROCEDURE — 80048 BASIC METABOLIC PNL TOTAL CA: CPT | Performed by: HOSPITALIST

## 2025-06-18 PROCEDURE — 74019 RADEX ABDOMEN 2 VIEWS: CPT

## 2025-06-18 PROCEDURE — 85018 HEMOGLOBIN: CPT

## 2025-06-18 PROCEDURE — 120N000001 HC R&B MED SURG/OB

## 2025-06-18 PROCEDURE — 99232 SBSQ HOSP IP/OBS MODERATE 35: CPT | Performed by: HOSPITALIST

## 2025-06-18 PROCEDURE — 93306 TTE W/DOPPLER COMPLETE: CPT | Mod: 26 | Performed by: INTERNAL MEDICINE

## 2025-06-18 PROCEDURE — 36415 COLL VENOUS BLD VENIPUNCTURE: CPT | Performed by: HOSPITALIST

## 2025-06-18 PROCEDURE — 250N000013 HC RX MED GY IP 250 OP 250 PS 637: Performed by: PHYSICIAN ASSISTANT

## 2025-06-18 RX ORDER — AMOXICILLIN 250 MG
2 CAPSULE ORAL 2 TIMES DAILY
Status: DISCONTINUED | OUTPATIENT
Start: 2025-06-18 | End: 2025-06-21

## 2025-06-18 RX ORDER — POLYETHYLENE GLYCOL 3350 17 G/17G
17 POWDER, FOR SOLUTION ORAL 2 TIMES DAILY
Status: DISCONTINUED | OUTPATIENT
Start: 2025-06-18 | End: 2025-06-21

## 2025-06-18 RX ORDER — BISACODYL 10 MG
10 SUPPOSITORY, RECTAL RECTAL ONCE
Status: COMPLETED | OUTPATIENT
Start: 2025-06-18 | End: 2025-06-18

## 2025-06-18 RX ORDER — SODIUM CHLORIDE 9 MG/ML
INJECTION, SOLUTION INTRAVENOUS CONTINUOUS
Status: ACTIVE | OUTPATIENT
Start: 2025-06-18 | End: 2025-06-18

## 2025-06-18 RX ADMIN — ACETAMINOPHEN 975 MG: 325 TABLET, FILM COATED ORAL at 12:47

## 2025-06-18 RX ADMIN — Medication 1 G: at 17:15

## 2025-06-18 RX ADMIN — SODIUM CHLORIDE: 0.9 INJECTION, SOLUTION INTRAVENOUS at 12:45

## 2025-06-18 RX ADMIN — FAMOTIDINE 20 MG: 20 TABLET, FILM COATED ORAL at 08:42

## 2025-06-18 RX ADMIN — ASPIRIN 81 MG: 81 TABLET, DELAYED RELEASE ORAL at 08:42

## 2025-06-18 RX ADMIN — ACETAMINOPHEN 975 MG: 325 TABLET, FILM COATED ORAL at 20:09

## 2025-06-18 RX ADMIN — Medication 1 G: at 08:42

## 2025-06-18 RX ADMIN — SENNOSIDES AND DOCUSATE SODIUM 2 TABLET: 50; 8.6 TABLET ORAL at 20:09

## 2025-06-18 RX ADMIN — POLYETHYLENE GLYCOL 3350 17 G: 17 POWDER, FOR SOLUTION ORAL at 06:58

## 2025-06-18 RX ADMIN — BISACODYL 10 MG: 10 SUPPOSITORY RECTAL at 12:57

## 2025-06-18 RX ADMIN — ASPIRIN 81 MG: 81 TABLET, DELAYED RELEASE ORAL at 20:09

## 2025-06-18 RX ADMIN — POLYETHYLENE GLYCOL 3350 17 G: 17 POWDER, FOR SOLUTION ORAL at 20:09

## 2025-06-18 RX ADMIN — CALCIUM CARBONATE (ANTACID) CHEW TAB 500 MG 1000 MG: 500 CHEW TAB at 06:50

## 2025-06-18 RX ADMIN — SENNOSIDES AND DOCUSATE SODIUM 1 TABLET: 50; 8.6 TABLET ORAL at 06:58

## 2025-06-18 RX ADMIN — LOSARTAN POTASSIUM 50 MG: 50 TABLET, FILM COATED ORAL at 08:41

## 2025-06-18 RX ADMIN — ONDANSETRON 4 MG: 4 TABLET, ORALLY DISINTEGRATING ORAL at 06:50

## 2025-06-18 RX ADMIN — LEVOTHYROXINE SODIUM 37.5 MCG: 75 TABLET ORAL at 08:41

## 2025-06-18 ASSESSMENT — ACTIVITIES OF DAILY LIVING (ADL)
ADLS_ACUITY_SCORE: 64
ADLS_ACUITY_SCORE: 67
ADLS_ACUITY_SCORE: 69
ADLS_ACUITY_SCORE: 67
ADLS_ACUITY_SCORE: 67
ADLS_ACUITY_SCORE: 64
ADLS_ACUITY_SCORE: 64
ADLS_ACUITY_SCORE: 67
ADLS_ACUITY_SCORE: 64
ADLS_ACUITY_SCORE: 69
ADLS_ACUITY_SCORE: 64
ADLS_ACUITY_SCORE: 67

## 2025-06-18 NOTE — PROGRESS NOTES
Care Management Follow Up    Length of Stay (days): 3    Expected Discharge Date: 06/19/2025     Concerns to be Addressed:       Patient plan of care discussed at interdisciplinary rounds: Yes    Anticipated Discharge Disposition:  (tcu)              Anticipated Discharge Services:    Anticipated Discharge DME:      Patient/family educated on Medicare website which has current facility and service quality ratings:    Education Provided on the Discharge Plan:    Patient/Family in Agreement with the Plan:      Referrals Placed by CM/SW:    Private pay costs discussed: Not applicable    Discussed  Partnership in Safe Discharge Planning  document with patient/family: No     Handoff Completed: No, handoff not indicated or clinically appropriate    Additional Information:  Per previous SW note, Previous  met with the patient's spouse to discuss TCU recc. Patient's spouse wanted referrals sent out to facilities in the [facility]. Per [facility], referrals were not send to any facility.     As of this Morning, Writer sent referral's to the following facilties,       [facility] who stated they would be able to take the patient, but into their Memory care unit as they are concerned over the patient's cognition. However, they will be the stay as a TCU.    Per [facility], they do not have beds available.     Writer met with the patient's spouse and updated. Patient's spouse was in agreement with the plan.     Writer reached out to the hospitalist inquiring when the patient would be medically cleared.    Writer reached out to Jaci at [facility] and confirmed the family would like to take the bed. [facility] stated they will send the message out to the director there to confirm.     Addendum 1619:  Writer was informed by the hospitalist to await AM labs tomorrow    Next Steps: Awaiting medical readiness, [facility] memory care,  and insurance auth.     JESSICA Camacho  [facility]  Hospital  Social Work

## 2025-06-18 NOTE — PLAN OF CARE
Goal Outcome Evaluation:    A&Ox3 - disoriented to time. Not OOB this shift. Purewick in place, incontinent. Denies pain.

## 2025-06-18 NOTE — PROGRESS NOTES
[facility]  Hospitalist Progress Note   06/18/2025          Assessment and Plan:       Batsheva Fuentes is a 79 year old female with below PMHx admitted on 6/15/2025 after sustaining a mechanical fall at home with subsequent R femur fracture. Admitted for Orthopedic surgery. Of note, pt currently enrolled in hospice care with [facility] for her hx of frontotemporal dementia.   --Discussed with patient's family, would like to continue medical care.    Status post open reduction internal fixation right hip using intramedullary nail on 6/16/2025  Mechanical fall with R femur fracture.   *XR shows acute mildly displaced and angulated intertrochanteric fracture of the right femur with moderate resulting varus angulation.   *CT head, cervical spine negative for acute pathology.   -Status post open reduction internal fixation right hip using intramedullary nail on 6/16/2025  -- Orthopedic surgery following, will defer postoperative care including pharmacological DVT prophylaxis, rehabilitation to orthopedic surgery.  On aspirin 81 mg twice daily for DVT prophylaxis.  Orthopedic surgery recommending weightbearing as tolerated right lower extremity.  Scheduled Tylenol 3 times daily for pain.  Atarax as needed for adjuvant pain.  Robaxin as needed for muscle spasms.  oxycodone 2.5-5 mg q4 hrs PRN,   IV dilaudid 0.2-0.4 mg q2 hrs PRN for pain not responding to a bowel regimen.  Bowel regimen while on narcotics.  Rehab team following.      Abdominal distention likely from ileus  Constipation  This morning patient reporting abdominal pain.  Has not had a bowel movement for 4 days.  No nausea or vomiting.  Abdomen distended, sluggish bowel sounds heard.  No tenderness noted  X-ray abdomen and pelvis ordered.  Aggressive bowel regimen after x-ray imaging-for bowel movements.  Will switch to full liquid diet, monitor closely.  Continue PTA famotidine    Urinary retention postoperative.  This morning  bladder scan for greater than 600 cc of urine.  Straight cath per protocol.  If fails voiding trial will replace Izaguirre catheter.  Bladder scans as needed.     Hyponatremia, SIADH:   Sodium 128 on admission. S/P ~600-700 ccs of mIVF with 0.9% NS in the ED. No hx of hyponatremia noted. Good appetite reported by pt's . No excessive water consumption. No evidence of volume overload. Pt is on Lexapro with last dose 6/15 AM prior to presentation   *Osm 273, urine sodium 120, urine osm 530 consistent with SIADH. ? Pain mediated given fracture.   *TSH 18.33, T4 1.59.   *2% at 30 ccs/hr initiated early AM 6/16, increased to 50 ccs/hr 6/16 AM due to stable hyponatremia   -- now off IV fluids, sodium this morning 127.  Poor oral intake  Nephrology signed off, appreciate input.  -- Given poor oral intake will order 500 cc of NS over 5 hours [inferior vena cava small consistent with hypovolemia on echo ]  Continue 1200 ml fluid restriction   - Hold Lexapro (note recent dose adjustment from 10mg>15 mg > 20 mg over the past month)  - Continue salt tabs 1 g BID for 2 more doses  - Telemetry   - Seizure precautions.  Fall precautions.  Monitor sodium levels in a.m. or earlier if symptomatic    Acute anemia postoperative.  Preop hemoglobin in the 12 range.  Now dropped to 8.7  No active bleeding.  Documented  mL.  Monitor hemoglobin in a.m. or earlier if symptomatic  Transfuse for hemoglobin less than 7 or symptomatic.    Moderate concentric LVH, small pericardial effusion without tamponade  Elevated bilirubin of 1.4..  Echocardiogram Echocardiogram with LVEF of 65 to 70%.  Moderate concentric LVH.  Consider infiltrative cardiomyopathy.  Small pericardial effusion.  No echocardiographic indications for cardiac tamponade.  Small inferior vena cava consistent with hypovolemia.  Monitor volume status closely.  Cardiology evaluation as outpatient.    Hypertension: Uncontrolled on admission in the setting of pain. Improving    Continue PTA losartan.  As needed hydralazine ordered.    Leukocytosis, improved  WBC 28.3, ANC 25 on admission. No infectious sx reported on admission. No open wounds or sores. No recent steroids. Afebrile. Baseline WBC normal when checked a couple years prior.   UA unremarkable. No known hx of MDS. ? Reactive in the setting of stress from fracture, but seems a bit high for that. CXR negative for evidence of infection. Repeat WBC 13.9. procal 0.58 (elevation in the setting of trauma from fall above?).  --On 6/17 WBC count within normal limits.  Hold antibiotics, trend fever curve     Abnormal TFTs   Hx of hypothyroidism: TSH 18.33, T4 1.59. Pt maintained on Synthroid  37.5 mcg daily PTA, but was decreased from 75 mcg ~1 month PTA upon enrollment in hospice. TSH 2.50 and T4 1.52 in 1/2025.   - Continue Synthroid at current dose, suspect stress demargination from above. Repeat TFTs in 4-6 weeks.      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 Rouse of Neurology 4/10/25 and Dr. Collado of Psychiatry  Holding PTA Lexapro as above.  Closely monitor mental status.  Minimize narcotic use as able to.  Fall precautions, delirium precautions     Chronic, stable medical conditions   Diverticulitis s/p colectomy   Cerebral aneurysm, SAH  Polymyalgia rheumatica   Anxiety   Depression   Tardive akathisia, improved with Risperdone      Physical deconditioning from medical illness.  Rehab team evaluation.  Care management assisting with transition plans    Hypoalbuminemia likely dilutional, acute illness.  Dietary supplements with Ensure.  Next    Clinically Significant Risk Factors         # Hyponatremia: Lowest Na = 127 mmol/L in last 2 days, will monitor as appropriate  # Hypochloremia: Lowest Cl = 95 mmol/L in last 2 days, will monitor as appropriate  # Hypocalcemia: Lowest Ca = 8.4 mg/dL in last 2 days, will monitor and replace as  "appropriate     # Hypoalbuminemia: Lowest albumin = 3.2 g/dL at 6/18/2025  7:47 AM, will monitor as appropriate     # Hypertension: Noted on problem list            # Cachexia: Estimated body mass index is 14.47 kg/m  as calculated from the following:    Height as of this encounter: 1.702 m (5' 7\").    Weight as of this encounter: 41.9 kg (92 lb 6 oz)., PRESENT ON ADMISSION                Orders Placed This Encounter      Full Liquid Diet      DVT Prophylaxis: SCDs, on aspirin for DVT prophylaxis  Code Status: No CPR- Do NOT Intubate  Disposition: Expected discharge in 2 days pending clinical improvement    Medically Ready for Discharge: Anticipated in 2-4 Days       Discussed with patient,  by the bedside, bedside RN, care team.    Total time greater than 51 minutes.  More than 70% of time spent in direct patient care, care coordination, patient counseling, and formalizing plan of care.        Sis Tay MD        Interval History:      Patient lying in bed.  Awake, can answer simple questions.    Complaining of abdominal discomfort, worse around the umbilicus.  Reports dull aching discomfort.  Has not had a bowel movement for 4 days.  Per report has not passed urine since this morning.  Bladder scan for greater than 600 cc of urine.  Denies any new back pain.  Denies any new pain at surgical site, reports pain okay controlled.  Denies any chest pain.  Denies any shortness of breath  Denies any palpitations.  Denies any abdominal pain.  Per report poor oral intake, sodium of 127 this morning.       Physical Exam:        Physical Exam   Temp:  [98.6  F (37  C)-99.2  F (37.3  C)] 98.8  F (37.1  C)  Pulse:  [] 88  Resp:  [12-18] 18  BP: (140-184)/() 166/90  SpO2:  [97 %-99 %] 98 %    Intake/Output Summary (Last 24 hours) at 6/17/2025 1600  Last data filed at 6/17/2025 1451  Gross per 24 hour   Intake 740 ml   Output 550 ml   Net 190 ml       Admission Weight: 45 kg (99 lb 3.3 oz)  Current " Weight: 45 kg (99 lb 3.3 oz)    PHYSICAL EXAM  GENERAL: Patient is in no distress. Alert and oriented.  HEENT: Oropharynx pink  HEART: Regular rate and rhythm. S1S2.   LUNGS: Bilateral slightly decreased breath sounds, no wheezing or crackles  Respirations unlabored  ABDOMEN: Soft, no abdominal tenderness, bowel sounds heard   NEURO: Moving all extremities  EXTREMITIES: No pedal edema.  Surgical site dressing intact.  SKIN: Warm, dry.  PSYCHIATRY Cooperative       Medications:        Current Facility-Administered Medications   Medication Dose Route Frequency Provider Last Rate Last Admin    acetaminophen (TYLENOL) tablet 975 mg  975 mg Oral Q8H Kassy Loya PA-C   975 mg at 06/18/25 1247    aspirin EC tablet 81 mg  81 mg Oral BID Kassy Loya PA-C   81 mg at 06/18/25 0842    [Held by provider] escitalopram (LEXAPRO) tablet 20 mg  20 mg Oral Daily Kay Tam PA-C        famotidine (PEPCID) tablet 20 mg  20 mg Oral Daily Sean Miles MD   20 mg at 06/18/25 0842    levothyroxine (SYNTHROID/LEVOTHROID) half-tab 37.5 mcg  37.5 mcg Oral Daily Kay Tam PA-C   37.5 mcg at 06/18/25 0841    losartan (COZAAR) tablet 50 mg  50 mg Oral Daily Sis Tay MD   50 mg at 06/18/25 0841    polyethylene glycol (MIRALAX) Packet 17 g  17 g Oral BID Sis Tay MD        senna-docusate (SENOKOT-S/PERICOLACE) 8.6-50 MG per tablet 2 tablet  2 tablet Oral BID Sis Tay MD        sodium chloride (PF) 0.9% PF flush 3 mL  3 mL Intracatheter Q8H Kassy Loya PA-C   3 mL at 06/16/25 2029    sodium chloride (PF) 0.9% PF flush 3 mL  3 mL Intracatheter Q8H [facility] Kay Tam PA-C   3 mL at 06/18/25 1246    sodium chloride tablet 1 g  1 g Oral BID w/meals Kay Tam PA-C   1 g at 06/18/25 0842     Current Facility-Administered Medications   Medication Dose Route Frequency Provider Last Rate Last Admin    benzocaine-menthol (CHLORASEPTIC) 6-10 MG lozenge  1 lozenge  1 lozenge Buccal Q1H PRN Kassy Loya PA-C        bisacodyl (DULCOLAX) suppository 10 mg  10 mg Rectal Daily PRN Kassy Loya PA-C        calcium carbonate (TUMS) chewable tablet 1,000 mg  1,000 mg Oral 4x Daily PRN Kay Tam PA-C   1,000 mg at 06/18/25 0650    hydrALAZINE (APRESOLINE) injection 10 mg  10 mg Intravenous Q4H PRN Kay Tam PA-C   10 mg at 06/15/25 1803    HYDROmorphone (DILAUDID) injection 0.1 mg  0.1 mg Intravenous Q4H PRN Kassy Loya PA-C        Or    HYDROmorphone (DILAUDID) injection 0.2 mg  0.2 mg Intravenous Q4H PRN Kassy Loya PA-C   0.2 mg at 06/16/25 2108    hydrOXYzine HCl (ATARAX) tablet 10 mg  10 mg Oral Q6H PRN Kassy Loya PA-C   10 mg at 06/16/25 2309    lidocaine (LMX4) cream   Topical Q1H PRN Kassy Loya PA-C        lidocaine (LMX4) cream   Topical Q1H PRN Kay Tam PA-C        lidocaine 1 % 0.1-1 mL  0.1-1 mL Other Q1H PRN Kassy Loya PA-C        lidocaine 1 % 0.1-1 mL  0.1-1 mL Other Q1H PRN Kay Tam PA-C        magnesium hydroxide (MILK OF MAGNESIA) suspension 30 mL  30 mL Oral Daily PRN Kassy Loya PA-C        methocarbamol (ROBAXIN) half-tab 250 mg  250 mg Oral TID PRN Sis Tay MD        naloxone (NARCAN) injection 0.2 mg  0.2 mg Intravenous Q2 Min PRN Sean Miles MD        Or    naloxone (NARCAN) injection 0.4 mg  0.4 mg Intravenous Q2 Min PRN Sean Miles MD        Or    naloxone (NARCAN) injection 0.2 mg  0.2 mg Intramuscular Q2 Min PRN Sean Miles MD        Or    naloxone (NARCAN) injection 0.4 mg  0.4 mg Intramuscular Q2 Min PRN Sean Miles MD        ondansetron (ZOFRAN ODT) ODT tab 4 mg  4 mg Oral Q6H PRN Kassy Loya PA-C   4 mg at 06/18/25 0650    Or    ondansetron (ZOFRAN) injection 4 mg  4 mg Intravenous Q6H PRN Kassy Loya PA-C        oxyCODONE IR (ROXICODONE) half-tab 2.5 mg  2.5 mg Oral Q4H PRN Vincenzo,  SEE Elena        Or    oxyCODONE (ROXICODONE) tablet 5 mg  5 mg Oral Q4H PRN Kassy Loya PA-C   5 mg at 06/17/25 0629    polyethylene glycol (MIRALAX) Packet 17 g  17 g Oral BID PRN Kay Tam PA-C        prochlorperazine (COMPAZINE) injection 5 mg  5 mg Intravenous Q6H PRN Kassy Loya PA-C        Or    prochlorperazine (COMPAZINE) tablet 5 mg  5 mg Oral Q6H PRN Kassy Loya PA-C        senna-docusate (SENOKOT-S/PERICOLACE) 8.6-50 MG per tablet 1 tablet  1 tablet Oral BID PRN Kay Tam PA-C        Or    senna-docusate (SENOKOT-S/PERICOLACE) 8.6-50 MG per tablet 2 tablet  2 tablet Oral BID PRN Kay Tam PA-C        sodium chloride (PF) 0.9% PF flush 3 mL  3 mL Intracatheter q1 min prn Kassy Loya PA-C        sodium chloride (PF) 0.9% PF flush 3 mL  3 mL Intracatheter q1 min prn Kay Tam PA-C                Data:      All new lab and imaging data was reviewed.

## 2025-06-18 NOTE — PLAN OF CARE
"Pt AO to self, tired did not want to answer questions but said she was \"fine\". External catheter present, no output, bladder scanned.  Dressing CDI.  T/R overnight.      0700: patient complaining of stomach pains, given PRNS and scheduled stool softeners early as patient said she has not had a BM since she has been here.          "

## 2025-06-18 NOTE — PROGRESS NOTES
Orthopedic Surgery  Batsheva Fuentes  06/18/2025     Admit Date:  6/15/2025  Assessment:   POD: 2 Days Post-Op   Procedure(s):  OPEN REDUCTION INTERNAL FIXATION RIGHT HIP FRACTURE USING INTRAMEDULLARY NAIL     Patient resting comfortably in bed, her  is at bedside.     Pain controlled with medications  Tolerating oral intake  C/o of some stomach pains today, abdominal xray completed.   Also had Echo today   Denies chest pain or shortness of breath    Temp:  [98.6  F (37  C)-99.3  F (37.4  C)] 98.7  F (37.1  C)  Pulse:  [85-98] 88  Resp:  [12-18] 18  BP: (148-184)/() 181/100  SpO2:  [97 %-99 %] 99 %    Sleepy t/o exam.   Dressing is clean, dry, and intact   Minimal erythema of the surrounding skin   Bilateral calves are soft, non-tender  Sensation intact bilateral lower extremities  Patient able to flex/extend toes.  Does not follow instruction to range ankle.   Toes are warm to touch    Labs:  Recent Labs   Lab Test 06/18/25  0747 06/17/25  1505 06/17/25  0612 06/16/25  0540 06/15/25  1947   WBC  --   --  11.0 13.9* 28.3*   HGB 8.7* 8.2* 8.7* 11.1* 12.1   PLT  --   --  249 282 341     Recent Labs   Lab Test 06/15/25  1516   INR 1.03       Plan:   ASA 81 mg BID for DVT prophylaxis     Mobilize with PT/OT    WBAT Right LE     Continue current pain regimen   Dressings: Keep intact.  Change if >60% saturated or peeling off    Follow-up: 2 weeks post-op with Kassy/Dr Mccullough's team   Social work coordinating discharge planning    Disposition:    Anticipate d/c to be determined pending therapy evaluations    Coral Mares PA-C  [facility]

## 2025-06-19 VITALS
DIASTOLIC BLOOD PRESSURE: 85 MMHG | RESPIRATION RATE: 16 BRPM | TEMPERATURE: 98.7 F | HEART RATE: 81 BPM | SYSTOLIC BLOOD PRESSURE: 157 MMHG | OXYGEN SATURATION: 99 % | HEIGHT: 67 IN | BODY MASS INDEX: 15.85 KG/M2 | WEIGHT: 100.97 LBS

## 2025-06-19 LAB
ANION GAP SERPL CALCULATED.3IONS-SCNC: 10 MMOL/L (ref 7–15)
BUN SERPL-MCNC: 14.6 MG/DL (ref 8–23)
CALCIUM SERPL-MCNC: 8.2 MG/DL (ref 8.8–10.4)
CHLORIDE SERPL-SCNC: 95 MMOL/L (ref 98–107)
CREAT SERPL-MCNC: 0.61 MG/DL (ref 0.51–0.95)
EGFRCR SERPLBLD CKD-EPI 2021: 89 ML/MIN/1.73M2
GLUCOSE SERPL-MCNC: 104 MG/DL (ref 70–99)
HCO3 SERPL-SCNC: 23 MMOL/L (ref 22–29)
HGB BLD-MCNC: 7.2 G/DL (ref 11.7–15.7)
HGB BLD-MCNC: 7.6 G/DL (ref 11.7–15.7)
MCV RBC AUTO: 84 FL (ref 78–100)
MCV RBC AUTO: 86 FL (ref 78–100)
POTASSIUM SERPL-SCNC: 4.1 MMOL/L (ref 3.4–5.3)
SODIUM SERPL-SCNC: 128 MMOL/L (ref 135–145)

## 2025-06-19 PROCEDURE — 250N000013 HC RX MED GY IP 250 OP 250 PS 637: Performed by: HOSPITALIST

## 2025-06-19 PROCEDURE — 250N000011 HC RX IP 250 OP 636: Performed by: PHYSICIAN ASSISTANT

## 2025-06-19 PROCEDURE — 120N000001 HC R&B MED SURG/OB

## 2025-06-19 PROCEDURE — 250N000013 HC RX MED GY IP 250 OP 250 PS 637: Performed by: PHYSICIAN ASSISTANT

## 2025-06-19 PROCEDURE — 36415 COLL VENOUS BLD VENIPUNCTURE: CPT | Performed by: HOSPITALIST

## 2025-06-19 PROCEDURE — 250N000013 HC RX MED GY IP 250 OP 250 PS 637

## 2025-06-19 PROCEDURE — 82310 ASSAY OF CALCIUM: CPT | Performed by: HOSPITALIST

## 2025-06-19 PROCEDURE — 99232 SBSQ HOSP IP/OBS MODERATE 35: CPT | Performed by: HOSPITALIST

## 2025-06-19 PROCEDURE — 85018 HEMOGLOBIN: CPT | Performed by: HOSPITALIST

## 2025-06-19 PROCEDURE — 80048 BASIC METABOLIC PNL TOTAL CA: CPT | Performed by: HOSPITALIST

## 2025-06-19 RX ORDER — BISACODYL 10 MG
10 SUPPOSITORY, RECTAL RECTAL ONCE
Status: COMPLETED | OUTPATIENT
Start: 2025-06-19 | End: 2025-06-19

## 2025-06-19 RX ADMIN — ACETAMINOPHEN 975 MG: 325 TABLET, FILM COATED ORAL at 04:40

## 2025-06-19 RX ADMIN — LEVOTHYROXINE SODIUM 37.5 MCG: 75 TABLET ORAL at 09:57

## 2025-06-19 RX ADMIN — Medication 1 G: at 09:58

## 2025-06-19 RX ADMIN — SENNOSIDES AND DOCUSATE SODIUM 2 TABLET: 50; 8.6 TABLET ORAL at 20:29

## 2025-06-19 RX ADMIN — ASPIRIN 81 MG: 81 TABLET, DELAYED RELEASE ORAL at 10:33

## 2025-06-19 RX ADMIN — ASPIRIN 81 MG: 81 TABLET, DELAYED RELEASE ORAL at 20:29

## 2025-06-19 RX ADMIN — SENNOSIDES AND DOCUSATE SODIUM 2 TABLET: 50; 8.6 TABLET ORAL at 09:58

## 2025-06-19 RX ADMIN — HYDRALAZINE HYDROCHLORIDE 10 MG: 20 INJECTION INTRAMUSCULAR; INTRAVENOUS at 01:42

## 2025-06-19 RX ADMIN — Medication 1 G: at 17:09

## 2025-06-19 RX ADMIN — POLYETHYLENE GLYCOL 3350 17 G: 17 POWDER, FOR SOLUTION ORAL at 09:59

## 2025-06-19 RX ADMIN — POLYETHYLENE GLYCOL 3350 17 G: 17 POWDER, FOR SOLUTION ORAL at 20:29

## 2025-06-19 RX ADMIN — LOSARTAN POTASSIUM 50 MG: 50 TABLET, FILM COATED ORAL at 09:58

## 2025-06-19 RX ADMIN — ACETAMINOPHEN 975 MG: 325 TABLET, FILM COATED ORAL at 12:56

## 2025-06-19 RX ADMIN — BISACODYL 10 MG: 10 SUPPOSITORY RECTAL at 09:58

## 2025-06-19 RX ADMIN — FAMOTIDINE 20 MG: 20 TABLET, FILM COATED ORAL at 09:58

## 2025-06-19 RX ADMIN — HYDROXYZINE HYDROCHLORIDE 10 MG: 10 TABLET, FILM COATED ORAL at 20:29

## 2025-06-19 ASSESSMENT — ACTIVITIES OF DAILY LIVING (ADL)
ADLS_ACUITY_SCORE: 67
ADLS_ACUITY_SCORE: 62
ADLS_ACUITY_SCORE: 62
ADLS_ACUITY_SCORE: 67
ADLS_ACUITY_SCORE: 62
ADLS_ACUITY_SCORE: 66
ADLS_ACUITY_SCORE: 66
ADLS_ACUITY_SCORE: 62
ADLS_ACUITY_SCORE: 66
ADLS_ACUITY_SCORE: 67
ADLS_ACUITY_SCORE: 66
ADLS_ACUITY_SCORE: 62
ADLS_ACUITY_SCORE: 62
ADLS_ACUITY_SCORE: 66
ADLS_ACUITY_SCORE: 67

## 2025-06-19 NOTE — PLAN OF CARE
Pt AO to self and place.  VSS on RA ex HTN, hydralazine given x1, effective.  After hydralazine patient became increasingly anxious, calling repeatedly and yelling out because she wanted staff in the room, calmed patient down.

## 2025-06-19 NOTE — PROGRESS NOTES
Orthopedic Surgery  Batsheva Fuentes  06/19/2025     Admit Date:  6/15/2025  Assessment:   POD: 3 Days Post-Op   Procedure(s):  OPEN REDUCTION INTERNAL FIXATION RIGHT HIP FRACTURE USING INTRAMEDULLARY NAIL     Patient resting comfortably in bed, her  is at bedside.     Pain controlled with medications  Denies chest pain or shortness of breath  Ilieus being managed by medicine     Temp:  [98.5  F (36.9  C)-99.5  F (37.5  C)] 98.9  F (37.2  C)  Pulse:  [] 96  Resp:  [16-18] 16  BP: (132-184)/() 152/81  SpO2:  [95 %-99 %] 98 %    More alert today.   Dressing is clean, dry, and intact.   Moderate right thigh swelling and posterior/medical thigh ecchymosis present.   Minimal erythema of the surrounding skin   Bilateral calves are soft, non-tender  Sensation intact bilateral lower extremities  Patient able to flex/extend toes and ankles.   Toes are warm to touch    Labs:  Recent Labs   Lab Test 06/19/25  1010 06/18/25  0747 06/17/25  1505 06/17/25  0612 06/16/25  0540 06/15/25  1947   WBC  --   --   --  11.0 13.9* 28.3*   HGB 7.6* 8.7* 8.2* 8.7* 11.1* 12.1   PLT  --   --   --  249 282 341     Recent Labs   Lab Test 06/15/25  1516   INR 1.03       Plan:   ASA 81 mg BID for DVT prophylaxis     Mobilize with PT/OT    WBAT Right LE     Continue current pain regimen   Dressings: Change to aquacel today.     Follow-up: 2 weeks post-op with Kassy/Dr Mccullough's team   Social work coordinating discharge planning    Disposition:    Anticipate d/c to FAITH Mares PA-C  [facility]

## 2025-06-19 NOTE — PROGRESS NOTES
[facility]  Hospitalist Progress Note   06/19/2025          Assessment and Plan:       Batsheva Fuentes is a 79 year old female with below PMHx admitted on 6/15/2025 after sustaining a mechanical fall at home with subsequent R femur fracture.     Note, pt was enrolled in hospice care with [facility] for her hx of frontotemporal dementia PTA.   Discussed with patient's , would like to continue restorative care, TCU for rehabilitation.    Status post open reduction internal fixation right hip using intramedullary nail on 6/16/2025  Mechanical fall with R femur fracture.   *XR shows acute mildly displaced and angulated intertrochanteric fracture of the right femur with moderate resulting varus angulation.   *CT head, cervical spine negative for acute pathology.   -Status post open reduction internal fixation right hip using intramedullary nail on 6/16/2025.  --6/19 noted right thigh swelling, thigh ecchymosis with drop in hemoglobin.  Patient denies any acute pain.  --Orthopedic surgery following, will defer postoperative care including pharmacological DVT prophylaxis, rehabilitation to orthopedic surgery.  On aspirin 81 mg twice daily for DVT prophylaxis -will defer to Ortho.  Orthopedic surgery recommending weightbearing as tolerated right lower extremity.  Scheduled Tylenol 3 times daily for pain.  Atarax as needed for adjuvant pain.  Robaxin as needed for muscle spasms.  oxycodone 2.5-5 mg q4 hrs PRN, bowel meds while on narcotics.  Avoid narcotics as able to.  Rehab team following.      Acute anemia postoperative.  Preop hemoglobin in the 12 range.  Now dropped 8.7 > 7.6.   Documented  mL.  Patient having right thigh ecchymosis with  bruising.  Also received IV fluids  Monitor hemoglobin twice daily or earlier if symptomatic.   Transfuse for hemoglobin less than 7 or symptomatic.    Abdominal distention likely from ileus - improving   Constipation  --On 6/18 reported  abdominal distention, discomfort, no bowel movement for 4 days.  No nausea, vomiting.  Diffuse abdominal tenderness.  Xray abdomen - dilated loops of small bowel. There is gas in nondilated colon. Anastomotic staples in the pelvis. Most likely, findings represent a postoperative adynamic ileus.   -- Continue scheduled, as needed bowel regimen  Advance to soft diet.  Continue PTA famotidine.    Urinary retention postoperative.  This morning bladder scan for greater than 600 cc of urine.  Straight cath per protocol.  If fails voiding trial will replace Izaguirre catheter.  Bladder scans as needed.     Hyponatremia, SIADH:   Sodium 128 on admission. S/P ~600-700 ccs of mIVF with 0.9% NS in the ED. No hx of hyponatremia noted. Good appetite reported by pt's . No excessive water consumption. No evidence of volume overload. Pt is on Lexapro with last dose 6/15 AM prior to presentation   *Osm 273, urine sodium 120, urine osm 530 consistent with SIADH. ? Pain mediated given fracture.   *TSH 18.33, T4 1.59.   --Patient initially received 2% normal saline, sodium stable around 127-129.  Nephrology signed off, appreciate input.  Continue 1500 mL fluid restriction.  Continue salt tablet 1 g twice daily until tomorrow.    Hold Lexapro (note recent dose adjustment from 10mg>15 mg > 20 mg over the past month)  Telemetry   Seizure precautions.  Fall precautions.  Monitor sodium levels in a.m. or earlier if symptomatic    Moderate concentric LVH, small pericardial effusion without tamponade.  -Poor historian.  Denies any chest pain or shortness of breath at this time  Patient with elevated proBNP, Echocardiogram with LVEF of 65 to 70%.  Moderate concentric LVH.  Consider infiltrative cardiomyopathy.  Small pericardial effusion.  No echocardiographic indications for cardiac tamponade.  Small inferior vena cava consistent with hypovolemia.  -Monitor volume status closely.  -Cardiology evaluation as outpatient.    Hypertension:  Uncontrolled on admission in the setting of pain. Now Improving   Continue PTA losartan.    Leukocytosis, improved -likely reactive.  No Signs or symptoms of infection on admission.  Afebrile.  WBC 28.3.  Procalcitonin 0.58.  UA unremarkable.   --WBC count normalized on 6/17.  Trend fever curve.    Acute encephalopathy likely delirium, underlying dementia, metabolic component.  Physical deconditioning from medical illness.  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 Rouse of Neurology 4/10/25 and Dr. Collado of Psychiatry.  -- Patient with intermittent confusion, anxiety.  Minimize narcotic use as able to.  Holding PTA Lexapro as above.  Closely monitor mental status.  As needed Seroquel ordered.  Fall precautions, delirium precautions.  Rehab team following, likely TCU.     Abnormal TFTs   Hx of hypothyroidism:   TSH 18.33, T4 1.59. Pt maintained on Synthroid  37.5 mcg daily PTA, but was decreased from 75 mcg ~1 month PTA upon enrollment in hospice. TSH 2.50 and T4 1.52 in 1/2025.   - Continue Synthroid at current dose, suspect stress demargination from above.   Repeat TFTs in 4-6 weeks.      Chronic, stable medical conditions   Diverticulitis s/p colectomy   Cerebral aneurysm, SAH  Polymyalgia rheumatica   Tardive akathisia, improved with Risperdone      Hypoalbuminemia likely dilutional, acute illness.  Dietary supplements with Ensure.     Clinically Significant Risk Factors         # Hyponatremia: Lowest Na = 127 mmol/L in last 2 days, will monitor as appropriate  # Hypochloremia: Lowest Cl = 95 mmol/L in last 2 days, will monitor as appropriate      # Hypoalbuminemia: Lowest albumin = 3.2 g/dL at 6/18/2025  7:47 AM, will monitor as appropriate     # Hypertension: Noted on problem list            # Cachexia: Estimated body mass index is 14.47 kg/m  as calculated from the following:    Height as of  "this encounter: 1.702 m (5' 7\").    Weight as of this encounter: 41.9 kg (92 lb 6 oz)., PRESENT ON ADMISSION            Orders Placed This Encounter      Full Liquid Diet      DVT Prophylaxis: SCDs, on aspirin for DVT prophylaxis  Code Status: No CPR- Do NOT Intubate  Disposition: Expected discharge in 1-2 days pending clinical improvement    Medically Ready for Discharge: Anticipated Tomorrow       Discussed with patient,  by the bedside, bedside RN, care team.    Total time greater than 51 minutes.  More than 70% of time spent in direct patient care, care coordination, patient counseling, and formalizing plan of care.        Sis Tay MD        Interval History:      Patient lying in bed.  Awake, can answer simple questions.    Does admit to some improvement in abdominal discomfort.  Reports passing gas, has not had a bowel movement yet.  Received straight cath for urinary retention.  Denies any new back pain.  Denies any new pain at surgical site, reports pain okay controlled.  Denies any chest pain.  Denies any shortness of breath  Denies any palpitations.  Denies any abdominal pain.  Tolerating orals.  Denies any blood in the urine, no nosebleed.  Noted ecchymosis over the right thigh area       Physical Exam:        Physical Exam   Temp:  [98.5  F (36.9  C)-99.5  F (37.5  C)] 98.9  F (37.2  C)  Pulse:  [] 96  Resp:  [16-18] 16  BP: (132-184)/() 152/81  SpO2:  [95 %-99 %] 98 %    Intake/Output Summary (Last 24 hours) at 6/17/2025 1600  Last data filed at 6/17/2025 1451  Gross per 24 hour   Intake 740 ml   Output 550 ml   Net 190 ml       Admission Weight: 45 kg (99 lb 3.3 oz)  Current Weight: 45 kg (99 lb 3.3 oz)    PHYSICAL EXAM  GENERAL: Patient is in no distress. Alert and oriented.  HEENT: Oropharynx pink  HEART: Regular rate and rhythm. S1S2.   LUNGS: Bilateral slightly decreased breath sounds, no wheezing or crackles  Respirations unlabored  ABDOMEN: Soft, less distended, " minimal tenderness, bowel sounds heard.  NEURO: Moving all extremities.    EXTREMITIES: No pedal edema.  Surgical site dressing intact.  SKIN: Warm, dry. Ecchymosis right thigh area.  PSYCHIATRY Cooperative       Medications:        Current Facility-Administered Medications   Medication Dose Route Frequency Provider Last Rate Last Admin    acetaminophen (TYLENOL) tablet 975 mg  975 mg Oral Q8H Kassy Loya PA-C   975 mg at 06/19/25 0440    aspirin EC tablet 81 mg  81 mg Oral BID Kassy Loya PA-C   81 mg at 06/18/25 2009    [Held by provider] escitalopram (LEXAPRO) tablet 20 mg  20 mg Oral Daily Kay Tam PA-C        famotidine (PEPCID) tablet 20 mg  20 mg Oral Daily Sean Miles MD   20 mg at 06/18/25 0842    levothyroxine (SYNTHROID/LEVOTHROID) half-tab 37.5 mcg  37.5 mcg Oral Daily Kay Tam PA-C   37.5 mcg at 06/18/25 0841    losartan (COZAAR) tablet 50 mg  50 mg Oral Daily Sis Tay MD   50 mg at 06/18/25 0841    polyethylene glycol (MIRALAX) Packet 17 g  17 g Oral BID Sis Tay MD   17 g at 06/18/25 2009    senna-docusate (SENOKOT-S/PERICOLACE) 8.6-50 MG per tablet 2 tablet  2 tablet Oral BID Sis Tay MD   2 tablet at 06/18/25 2009    sodium chloride (PF) 0.9% PF flush 3 mL  3 mL Intracatheter Q8H Kassy Loya PA-C   3 mL at 06/18/25 2008    sodium chloride (PF) 0.9% PF flush 3 mL  3 mL Intracatheter Q8H [facility] Kay Tam PA-C   3 mL at 06/18/25 1246    sodium chloride tablet 1 g  1 g Oral BID w/meals Kay Tam PA-C   1 g at 06/18/25 1715     Current Facility-Administered Medications   Medication Dose Route Frequency Provider Last Rate Last Admin    benzocaine-menthol (CHLORASEPTIC) 6-10 MG lozenge 1 lozenge  1 lozenge Buccal Q1H PRN Kassy Loya PA-C        bisacodyl (DULCOLAX) suppository 10 mg  10 mg Rectal Daily PRN Kassy Loya PA-C        calcium carbonate (TUMS) chewable tablet 1,000 mg  1,000  mg Oral 4x Daily PRN Kay Tam PA-C   1,000 mg at 06/18/25 0650    hydrALAZINE (APRESOLINE) injection 10 mg  10 mg Intravenous Q4H PRN Kay Tam PA-C   10 mg at 06/19/25 0142    HYDROmorphone (DILAUDID) injection 0.1 mg  0.1 mg Intravenous Q4H PRN Kassy Loya PA-C        Or    HYDROmorphone (DILAUDID) injection 0.2 mg  0.2 mg Intravenous Q4H PRN Kassy Loya PA-C   0.2 mg at 06/16/25 2108    hydrOXYzine HCl (ATARAX) tablet 10 mg  10 mg Oral Q6H PRN Kassy Loya PA-C   10 mg at 06/16/25 2309    lidocaine (LMX4) cream   Topical Q1H PRN Kassy Loya PA-C        lidocaine (LMX4) cream   Topical Q1H PRN Kay Tam PA-C        lidocaine 1 % 0.1-1 mL  0.1-1 mL Other Q1H PRN Kassy Loya PA-C        lidocaine 1 % 0.1-1 mL  0.1-1 mL Other Q1H PRN Kay Tam PA-C        magnesium hydroxide (MILK OF MAGNESIA) suspension 30 mL  30 mL Oral Daily PRN Kassy Loya PA-C        methocarbamol (ROBAXIN) half-tab 250 mg  250 mg Oral TID PRN Sis Tay MD        naloxone (NARCAN) injection 0.2 mg  0.2 mg Intravenous Q2 Min PRN Sean Miles MD        Or    naloxone (NARCAN) injection 0.4 mg  0.4 mg Intravenous Q2 Min PRN Sean Miles MD        Or    naloxone (NARCAN) injection 0.2 mg  0.2 mg Intramuscular Q2 Min PRN Sean Miles MD        Or    naloxone (NARCAN) injection 0.4 mg  0.4 mg Intramuscular Q2 Min PRN Sean Miles MD        ondansetron (ZOFRAN ODT) ODT tab 4 mg  4 mg Oral Q6H PRN Kassy Loya PA-C   4 mg at 06/18/25 0650    Or    ondansetron (ZOFRAN) injection 4 mg  4 mg Intravenous Q6H PRN Kassy Loya PA-C        oxyCODONE IR (ROXICODONE) half-tab 2.5 mg  2.5 mg Oral Q4H PRN Kassy Loya PA-C        Or    oxyCODONE (ROXICODONE) tablet 5 mg  5 mg Oral Q4H PRN Kassy Loya PA-C   5 mg at 06/17/25 0629    polyethylene glycol (MIRALAX) Packet 17 g  17 g Oral BID PRN Kay Tam,  SEE        prochlorperazine (COMPAZINE) injection 5 mg  5 mg Intravenous Q6H PRN Kassy Loya PA-C        Or    prochlorperazine (COMPAZINE) tablet 5 mg  5 mg Oral Q6H PRN Kassy Loya PA-C        senna-docusate (SENOKOT-S/PERICOLACE) 8.6-50 MG per tablet 1 tablet  1 tablet Oral BID PRN Kay Tam PA-C        Or    senna-docusate (SENOKOT-S/PERICOLACE) 8.6-50 MG per tablet 2 tablet  2 tablet Oral BID PRN Kay Tam PA-C        sodium chloride (PF) 0.9% PF flush 3 mL  3 mL Intracatheter q1 min prn Kassy Loya PA-C        sodium chloride (PF) 0.9% PF flush 3 mL  3 mL Intracatheter q1 min prn Kay Tam PA-C                Data:      All new lab and imaging data was reviewed.

## 2025-06-19 NOTE — PLAN OF CARE
Goal Outcome Evaluation:      Plan of Care Reviewed With: patient    Shift Summary [id number]    Admitting Diagnosis: Closed displaced intertrochanteric fracture of right femur, initial encounter (H) [S72.141A]  Hip fracture, right, closed, initial encounter (H) [S72.001A]      POD#: 2 ORIF R HIP.   Mental Status: A/Ox4, int confused.   Activity/dangle: A2/GB/W, not oob this shift.   Diet: full liquid.   Pain: denied.   Izaguirre/Voiding: purewick in place, 1 BM(mucous) this shift.   Tele/Restraints/Iso: tele: SR.   02/LDA: RA. L PIV: SL.   D/C Date: TBD.     Other Info: bladder scan 184 at 2245 post void.     Temp: 99.5  F (37.5  C) Temp src: Oral BP: (!) 158/94 Pulse: 83   Resp: 16 SpO2: 98 % O2 Device: None (Room air)

## 2025-06-19 NOTE — PLAN OF CARE
Shift Summary      Ortho    Diagnosis: Mechanical fall with R femur fracture  S/p R hip ORIF  POD#: 2  Mental Status: A/Ox4 but has intermiottent confusion  Activity/dangle: Assit of 1-2 w/ GB and walker  Diet: Full liquid diet with poor appetite, refused to eat some bites only  Pain: Abdominal discomfort in AM but denies after  Izaguirre/Voiding: Urinary retention, straight cath once this shift for 600 ml. Not voided yet after the above procedure BS @ 1727 250 ml.   Tele/Restraints/Iso: On tele- SR  02/LDA: RA, PIV  D/C Date: TCU placement  Other Info:    Abdominal discomfort this AM, has distended abdomen, faint bowel sounds. MD was updated with order for Xray. Has Ileus with new order. Gave supp with no result yet. BS is hypoactive now, stomach distention is much lesser than this morning.  reported that pt is more confused this evening, checked orientation still oriented X4. CMS intact, incision dressing CDI.

## 2025-06-19 NOTE — PROGRESS NOTES
Care Management Follow Up    Length of Stay (days): 4    Expected Discharge Date: 06/20/2025     Concerns to be Addressed:       Patient plan of care discussed at interdisciplinary rounds: Yes    Anticipated Discharge Disposition:  (tcu)              Anticipated Discharge Services:    Anticipated Discharge DME:      Patient/family educated on Medicare website which has current facility and service quality ratings:    Education Provided on the Discharge Plan:    Patient/Family in Agreement with the Plan:      Referrals Placed by CM/SW:    Private pay costs discussed: Not applicable    Discussed  Partnership in Safe Discharge Planning  document with patient/family: No     Handoff Completed: No, handoff not indicated or clinically appropriate    Additional Information:  Per DOD,      Writer reached out to Jaci at [facility] who confirmed they spoke with the director over at the memory care who stated they were not able to accommodate the additional patient.     Writer sent additional referrals.      Writer met with the patient and spouse at bedside and updated on the above. Patient's spouse stated they were disappointed [facility] was not able to accommodate, but was ok with additional referrals being send. Patient's spouse stated the patient is not as bad a typical dementia patient's. Writer stated they will resend the referral to advocate for the patient a little more. Writer went over the transportation plan with the spouse and informed of the potential out of pocket costs. Patient's spouse acknowledged.     Addendum 1512:  Per DOD,      Writer received a message from Carla at [facility] who stated they could take the patient tomorrow.    Writer updated the patient's spouse, Onesimo, at bedside who consented to moving forward with [facility] as the discharge plan.  Onesimo was made aware of the potential out-of-pocket cost for transportation and requested transportation to be set up.  Writer stated he will work on the insurance  authorization for tomorrow.    Writer contacted  [employer] [facility] and scheduled a wheelchair ride between 1130-12 15.    Writer contacted Angela and initiated the authorization.  Authorization case ID is [id number].  Writer faxed over the clinical information via the communications tab and had faxed it to the fax number [phone].    Writer updated the provider.    PAS-RR    D: Per DHS regulation, SW completed and submitted PAS-RR to [place] [employer] Direct Connect via the Senior LinkAge Line.  PAS-RR confirmation # is : [id number]    I: SW spoke with patient and they are aware a PAS-RR has been submitted.  SW reviewed with patient that they may be contacted for a follow up appointment within 10 days of hospital discharge if their SNF stay is < 30 days.  Contact information for [facility] LinkAge Line was also provided.    A: patient verbalized understanding.    P: Further questions may be directed to [facility] LinkAge Line at #[phone], option #4 for PAS-RR staff.      Next Steps: Follow-up with insurance authorization and awaiting medical readiness.    JESSICA Camacho  [facility]  Social Work

## 2025-06-20 LAB
ABO + RH BLD: NORMAL
ANION GAP SERPL CALCULATED.3IONS-SCNC: 8 MMOL/L (ref 7–15)
BLD GP AB SCN SERPL QL: NEGATIVE
BLD PROD TYP BPU: NORMAL
BLOOD COMPONENT TYPE: NORMAL
BUN SERPL-MCNC: 10 MG/DL (ref 8–23)
CALCIUM SERPL-MCNC: 8.1 MG/DL (ref 8.8–10.4)
CHLORIDE SERPL-SCNC: 97 MMOL/L (ref 98–107)
CODING SYSTEM: NORMAL
CREAT SERPL-MCNC: 0.57 MG/DL (ref 0.51–0.95)
CROSSMATCH: NORMAL
EGFRCR SERPLBLD CKD-EPI 2021: >90 ML/MIN/1.73M2
GLUCOSE SERPL-MCNC: 99 MG/DL (ref 70–99)
HCO3 SERPL-SCNC: 26 MMOL/L (ref 22–29)
HGB BLD-MCNC: 7.8 G/DL (ref 11.7–15.7)
ISSUE DATE AND TIME: NORMAL
MCV RBC AUTO: 89 FL (ref 78–100)
POTASSIUM SERPL-SCNC: 3.7 MMOL/L (ref 3.4–5.3)
SODIUM SERPL-SCNC: 131 MMOL/L (ref 135–145)
SPECIMEN EXP DATE BLD: NORMAL
UNIT ABO/RH: NORMAL
UNIT NUMBER: NORMAL
UNIT STATUS: NORMAL
UNIT TYPE ISBT: 6200

## 2025-06-20 PROCEDURE — 85018 HEMOGLOBIN: CPT | Performed by: HOSPITALIST

## 2025-06-20 PROCEDURE — 86901 BLOOD TYPING SEROLOGIC RH(D): CPT | Performed by: HOSPITALIST

## 2025-06-20 PROCEDURE — 80048 BASIC METABOLIC PNL TOTAL CA: CPT | Performed by: HOSPITALIST

## 2025-06-20 PROCEDURE — 120N000001 HC R&B MED SURG/OB

## 2025-06-20 PROCEDURE — 250N000013 HC RX MED GY IP 250 OP 250 PS 637: Performed by: HOSPITALIST

## 2025-06-20 PROCEDURE — 86900 BLOOD TYPING SEROLOGIC ABO: CPT | Performed by: HOSPITALIST

## 2025-06-20 PROCEDURE — 36415 COLL VENOUS BLD VENIPUNCTURE: CPT | Performed by: HOSPITALIST

## 2025-06-20 PROCEDURE — 250N000013 HC RX MED GY IP 250 OP 250 PS 637: Performed by: PHYSICIAN ASSISTANT

## 2025-06-20 PROCEDURE — P9016 RBC LEUKOCYTES REDUCED: HCPCS | Performed by: HOSPITALIST

## 2025-06-20 PROCEDURE — 250N000013 HC RX MED GY IP 250 OP 250 PS 637

## 2025-06-20 PROCEDURE — 99233 SBSQ HOSP IP/OBS HIGH 50: CPT | Performed by: HOSPITALIST

## 2025-06-20 PROCEDURE — 86922 COMPATIBILITY TEST ANTIGLOB: CPT | Performed by: HOSPITALIST

## 2025-06-20 RX ORDER — SODIUM CHLORIDE 1 G/1
1 TABLET ORAL
Status: DISCONTINUED | OUTPATIENT
Start: 2025-06-21 | End: 2025-06-20

## 2025-06-20 RX ORDER — POLYETHYLENE GLYCOL 3350 17 G/17G
17 POWDER, FOR SOLUTION ORAL DAILY
DISCHARGE
Start: 2025-06-20

## 2025-06-20 RX ORDER — METHOCARBAMOL 500 MG/1
250 TABLET, FILM COATED ORAL 3 TIMES DAILY PRN
DISCHARGE
Start: 2025-06-20

## 2025-06-20 RX ORDER — BISACODYL 10 MG
10 SUPPOSITORY, RECTAL RECTAL DAILY PRN
DISCHARGE
Start: 2025-06-20

## 2025-06-20 RX ORDER — HYDROXYZINE HYDROCHLORIDE 10 MG/1
10 TABLET, FILM COATED ORAL EVERY 6 HOURS PRN
DISCHARGE
Start: 2025-06-20

## 2025-06-20 RX ORDER — FUROSEMIDE 20 MG/1
10 TABLET ORAL DAILY
Status: DISCONTINUED | OUTPATIENT
Start: 2025-06-20 | End: 2025-06-22

## 2025-06-20 RX ORDER — FUROSEMIDE 20 MG/1
10 TABLET ORAL DAILY
Status: DISCONTINUED | OUTPATIENT
Start: 2025-06-20 | End: 2025-06-20

## 2025-06-20 RX ORDER — ESCITALOPRAM OXALATE 5 MG/1
5 TABLET ORAL DAILY
Status: DISCONTINUED | OUTPATIENT
Start: 2025-06-21 | End: 2025-06-23 | Stop reason: HOSPADM

## 2025-06-20 RX ORDER — AMOXICILLIN 250 MG
2 CAPSULE ORAL 2 TIMES DAILY PRN
DISCHARGE
Start: 2025-06-20

## 2025-06-20 RX ORDER — ASPIRIN 81 MG/1
81 TABLET, COATED ORAL 2 TIMES DAILY
DISCHARGE
Start: 2025-06-20

## 2025-06-20 RX ADMIN — ASPIRIN 81 MG: 81 TABLET, DELAYED RELEASE ORAL at 10:53

## 2025-06-20 RX ADMIN — LOSARTAN POTASSIUM 50 MG: 50 TABLET, FILM COATED ORAL at 10:53

## 2025-06-20 RX ADMIN — HYDROXYZINE HYDROCHLORIDE 10 MG: 10 TABLET, FILM COATED ORAL at 23:23

## 2025-06-20 RX ADMIN — METHOCARBAMOL 250 MG: 500 TABLET ORAL at 11:42

## 2025-06-20 RX ADMIN — HYDROXYZINE HYDROCHLORIDE 10 MG: 10 TABLET, FILM COATED ORAL at 03:44

## 2025-06-20 RX ADMIN — ASPIRIN 81 MG: 81 TABLET, DELAYED RELEASE ORAL at 20:48

## 2025-06-20 RX ADMIN — LEVOTHYROXINE SODIUM 37.5 MCG: 75 TABLET ORAL at 10:53

## 2025-06-20 RX ADMIN — HYDROXYZINE HYDROCHLORIDE 10 MG: 10 TABLET, FILM COATED ORAL at 15:08

## 2025-06-20 RX ADMIN — Medication 1 G: at 10:54

## 2025-06-20 RX ADMIN — FUROSEMIDE 10 MG: 20 TABLET ORAL at 17:44

## 2025-06-20 RX ADMIN — FAMOTIDINE 20 MG: 20 TABLET, FILM COATED ORAL at 10:54

## 2025-06-20 ASSESSMENT — ACTIVITIES OF DAILY LIVING (ADL)
ADLS_ACUITY_SCORE: 67
ADLS_ACUITY_SCORE: 71
ADLS_ACUITY_SCORE: 66
ADLS_ACUITY_SCORE: 67
ADLS_ACUITY_SCORE: 66
ADLS_ACUITY_SCORE: 71
ADLS_ACUITY_SCORE: 67
ADLS_ACUITY_SCORE: 66
ADLS_ACUITY_SCORE: 67
ADLS_ACUITY_SCORE: 71
ADLS_ACUITY_SCORE: 67
ADLS_ACUITY_SCORE: 71
ADLS_ACUITY_SCORE: 66
ADLS_ACUITY_SCORE: 67
ADLS_ACUITY_SCORE: 75
ADLS_ACUITY_SCORE: 67
ADLS_ACUITY_SCORE: 71

## 2025-06-20 NOTE — PROGRESS NOTES
[facility]  Hospitalist Progress Note   06/20/2025          Assessment and Plan:       Batsheva Fuentes is a 79 year old female with below PMHx admitted on 6/15/2025 after sustaining a mechanical fall at home.    Note, pt was enrolled in hospice care with [facility] for her hx of frontotemporal dementia PTA.  Hospice revoked and patient admitted to hospital for medical care.  Discussed with patient's , would like to continue restorative care at this time, trial off TCU for rehabilitation.    Status post open reduction internal fixation right hip using intramedullary nail on 6/16/2025.  Acute traumatic right femur fracture.  Status post mechanical fall prior to admission.  -Pt  reported that they both went out for a walk, while returning into the home pt tripped on a plastic, fell and landed on her right side.  Denied loss of consciousness.  After fall patient reported pain in right hip.  *CT head, cervical spine negative for acute pathology.   *XR showed acute mildly displaced and angulated intertrochanteric fracture of the right femur with moderate resulting varus angulation.   -Status post open reduction internal fixation right hip using intramedullary nail on 6/16/2025.  -Postprocedure patient having right thigh swelling, ecchymosis with drop in hemoglobin.  Patient with dementia, denies any acute pain.  --Orthopedic surgery following, will defer postoperative care including pharmacological DVT prophylaxis, rehabilitation to orthopedic surgery.  On aspirin 81 mg twice daily for DVT prophylaxis  Weightbearing as tolerated right lower extremity.  Scheduled Tylenol 3 times daily for pain.  Atarax as needed for adjuvant pain.  Robaxin as needed for muscle spasms.  Oxycodone 2.5-5 mg q4 hrs PRN, bowel meds while on narcotics.  Avoid narcotics as able to.  Rehab team following -TCU for rehabilitation    Acute anemia postoperative -likely due to surgical blood loss,  dilutional  Preop hemoglobin in the 12.5 range.  Now dropped 8.7 > 7 range  Documented  mL.    --Patient having right thigh ecchymosis with  bruising.  Also received IV fluids.  Consent for blood transfusion obtained.  Transfuse 1 unit PRBC.  Monitor hemoglobin twice daily or earlier if symptomatic.  Transfuse for hemoglobin less than 8 or symptomatic.    Abdominal distention likely from ileus - improving   Constipation  --On 6/18 reported abdominal distention, discomfort, no bowel movement for 4 days.  No nausea, vomiting.  Diffuse abdominal tenderness.  --Xray abdomen - dilated loops of small bowel. There is gas in nondilated colon. Anastomotic staples in the pelvis. Most likely, findings represent a postoperative adynamic ileus.   --Schedule an aggressive bowel regiment with which having bowel movements.    --Advance to regular diet.  Continue PTA famotidine.  Continue scheduled, as needed bowel regimen    Urinary retention postoperative.  This morning bladder scan for greater than 300 cc of urine.  Straight cath per protocol.  If fails voiding trial will replace Izaguirre catheter.  Bladder scans as needed.     Acute hyponatremia, SIADH:   --No hx of hyponatremia noted. Good appetite reported by pt's . No excessive water consumption. No evidence of volume overload. Pt is on Lexapro with last dose 6/15 AM prior to presentation.  -Sodium 128 on admission.   *Osm 273, urine sodium 120, urine osm 530 consistent with SIADH. ? Pain mediated given fracture.   *TSH 18.33, T4 1.59.   --Patient initially received 2% normal saline, sodium slightly trending up.  Nephrology signed off, appreciate input.  Continue 1500 mL fluid restriction.  Decrease salt tablets to once daily on 6/20 and discontinue 6/21.  Resume Lexapro at low-dose of 5 mg oral daily.  Monitor sodium levels in a.m. or earlier if symptomatic.    Small pericardial effusion without tamponade.  Moderate concentric LVH  -Poor historian.  Denies any  chest pain or shortness of breath at this time  -Patient with elevated proBNP, Echocardiogram with LVEF of 65 to 70%.  Moderate concentric LVH.  Consider infiltrative cardiomyopathy.  Small pericardial effusion.  No echocardiographic indications for cardiac tamponade.  Small inferior vena cava consistent with hypovolemia.  -Monitor volume status closely.  -Cardiology evaluation as outpatient.    Status post accelerated hypertension.  Hypertension.    At the time of admission blood pressures systolic in 200s, was in pain.  Initiated prior to admission losartan, pain control.  Continues to have elevated blood pressures.  This morning systolics in 170s.  Continue PTA losartan.  Ordered 10 mg of oral Lasix.  As needed IV hydralazine for  Monitor blood pressures, optimize dose     Acute encephalopathy likely delirium, underlying dementia, metabolic component.  Physical deconditioning from medical illness.  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 Rouse of Neurology 4/10/25 and Dr. Collado of Psychiatry.  -- Patient with intermittent confusion, able to answer simple questions.  Minimize narcotic use as able to.  Resume PTA Lexapro at lower dose.  As needed Seroquel ordered.  Closely monitor mental status.  Fall precautions, delirium precautions.  Rehab team following TCU.    Leukocytosis, improved -likely reactive.  No Signs or symptoms of infection on admission.  Afebrile.  WBC 28.3.  Procalcitonin 0.58.  UA unremarkable.   --WBC count normalized on 6/17.  Trend fever curve.     Abnormal TFTs   Hx of hypothyroidism:   TSH 18.33, T4 1.59. Pt maintained on Synthroid  37.5 mcg daily PTA, but was decreased from 75 mcg ~1 month PTA upon enrollment in hospice. TSH 2.50 and T4 1.52 in 1/2025.   - Continue Synthroid at current dose, suspect stress demargination from above.   Repeat TFTs in 4-6 weeks.      Chronic,  "stable medical conditions   Diverticulitis s/p colectomy   Cerebral aneurysm, SAH  Polymyalgia rheumatica   Tardive akathisia, improved with Risperdone      Hypoalbuminemia likely dilutional, acute illness.  Dietary supplements with Ensure.     Clinically Significant Risk Factors         # Hyponatremia: Lowest Na = 128 mmol/L in last 2 days, will monitor as appropriate  # Hypochloremia: Lowest Cl = 95 mmol/L in last 2 days, will monitor as appropriate      # Hypoalbuminemia: Lowest albumin = 3.2 g/dL at 6/18/2025  7:47 AM, will monitor as appropriate     # Hypertension: Noted on problem list            # Cachexia: Estimated body mass index is 15.75 kg/m  as calculated from the following:    Height as of this encounter: 1.702 m (5' 7\").    Weight as of this encounter: 45.6 kg (100 lb 8.5 oz).             Orders Placed This Encounter      Regular Diet Adult      DVT Prophylaxis: SCDs, on aspirin for DVT prophylaxis  Code Status: No CPR- Do NOT Intubate  Disposition: Expected discharge in 1-2 days pending clinical improvement    Medically Ready for Discharge: Anticipated Tomorrow       Discussed with patient,  by the bedside, bedside RN, SW   More than 70% of time spent in direct patient care, care coordination, patient counseling, and formalizing plan of care.        Sis Tay MD        Interval History:      Patient lying in bed.  Awake, can answer simple questions.  disoriented.  Generalized weakness, minimal ambulation out of bed per report.  Up with assistance of 1-2 with gait belt.  Reports passing gas, had a bowel movement.  Admits to some improvement in abdominal pain.  Poor appetite per report.  Continues to have soreness, swelling on right lower extremity  Denies any shortness of breath on rest.  Denies any blood in the urine, no nosebleed.  Having urinary retention, parameters not met for straight cath this morning.   Telemetry sinus rhythm.    Elevated blood pressures, systolics this morning " in 160s to 170s.         Physical Exam:        Physical Exam   Temp:  [97.7  F (36.5  C)-99  F (37.2  C)] 97.7  F (36.5  C)  Pulse:  [77-83] 77  Resp:  [16] 16  BP: (150-170)/(75-88) 156/88  SpO2:  [98 %-100 %] 98 %    Intake/Output Summary (Last 24 hours) at 6/17/2025 1600  Last data filed at 6/17/2025 1451  Gross per 24 hour   Intake 740 ml   Output 550 ml   Net 190 ml       Admission Weight: 45 kg (99 lb 3.3 oz)  Current Weight: 45 kg (99 lb 3.3 oz)    PHYSICAL EXAM  GENERAL: Patient is in no distress.  Awake, can answer simple questions.  HEART: Regular rate and rhythm. S1S2.   LUNGS: Bilateral slightly decreased breath sounds, no wheezing or crackles  Respirations unlabored  ABDOMEN: Soft, less distended, minimal tenderness, bowel sounds heard.  NEURO: Moving all extremities.    EXTREMITIES: + pedal edema.  Surgical site dressing intact.  SKIN: Warm, dry. Ecchymosis right thigh area.  PSYCHIATRY Cooperative       Medications:        Current Facility-Administered Medications   Medication Dose Route Frequency Provider Last Rate Last Admin    aspirin EC tablet 81 mg  81 mg Oral BID Kassy Loya PA-C   81 mg at 06/20/25 1053    [Held by provider] escitalopram (LEXAPRO) tablet 20 mg  20 mg Oral Daily Kay Tam PA-C        famotidine (PEPCID) tablet 20 mg  20 mg Oral Daily Sean Miles MD   20 mg at 06/20/25 1054    levothyroxine (SYNTHROID/LEVOTHROID) half-tab 37.5 mcg  37.5 mcg Oral Daily Kay Tam PA-C   37.5 mcg at 06/20/25 1053    losartan (COZAAR) tablet 50 mg  50 mg Oral Daily Ssi Tay MD   50 mg at 06/20/25 1053    polyethylene glycol (MIRALAX) Packet 17 g  17 g Oral BID Sis Tay MD   17 g at 06/19/25 2029    senna-docusate (SENOKOT-S/PERICOLACE) 8.6-50 MG per tablet 2 tablet  2 tablet Oral BID Sis Tay MD   2 tablet at 06/19/25 2029    sodium chloride (PF) 0.9% PF flush 3 mL  3 mL Intracatheter Q8H Kassy Loya PA-C   3 mL at  06/20/25 0345    sodium chloride (PF) 0.9% PF flush 3 mL  3 mL Intracatheter Q8H [facility] Kay Tam PA-C   3 mL at 06/20/25 0515    [START ON 6/21/2025] sodium chloride tablet 1 g  1 g Oral Daily at 10 am Sis Tay MD         Current Facility-Administered Medications   Medication Dose Route Frequency Provider Last Rate Last Admin    benzocaine-menthol (CHLORASEPTIC) 6-10 MG lozenge 1 lozenge  1 lozenge Buccal Q1H PRN Kassy Loya PA-C        bisacodyl (DULCOLAX) suppository 10 mg  10 mg Rectal Daily PRN Kassy Loya PA-C        calcium carbonate (TUMS) chewable tablet 1,000 mg  1,000 mg Oral 4x Daily PRN Kay Tam PA-C   1,000 mg at 06/18/25 0650    hydrALAZINE (APRESOLINE) injection 10 mg  10 mg Intravenous Q4H PRN Kay Tam PA-C   10 mg at 06/19/25 0142    HYDROmorphone (DILAUDID) injection 0.1 mg  0.1 mg Intravenous Q4H PRN Kassy Loya PA-C        Or    HYDROmorphone (DILAUDID) injection 0.2 mg  0.2 mg Intravenous Q4H PRN Kassy Loya PA-C   0.2 mg at 06/16/25 2108    hydrOXYzine HCl (ATARAX) tablet 10 mg  10 mg Oral Q6H PRN Kassy Loya PA-C   10 mg at 06/20/25 0344    lidocaine (LMX4) cream   Topical Q1H PRN Kassy Loya PA-C        lidocaine (LMX4) cream   Topical Q1H PRN Kay Tam PA-C        lidocaine 1 % 0.1-1 mL  0.1-1 mL Other Q1H PRN Kassy Loya PA-C        lidocaine 1 % 0.1-1 mL  0.1-1 mL Other Q1H PRN Kay Tam PA-C        magnesium hydroxide (MILK OF MAGNESIA) suspension 30 mL  30 mL Oral Daily PRN Kassy Loya PA-C        methocarbamol (ROBAXIN) half-tab 250 mg  250 mg Oral TID PRN Sis Tay MD   250 mg at 06/20/25 1142    naloxone (NARCAN) injection 0.2 mg  0.2 mg Intravenous Q2 Min PRN Sean Miles MD        Or    naloxone (NARCAN) injection 0.4 mg  0.4 mg Intravenous Q2 Min PRN Sean Miles MD        Or    naloxone (NARCAN) injection 0.2 mg  0.2 mg Intramuscular  Q2 Min PRN Sean Miles MD        Or    naloxone (NARCAN) injection 0.4 mg  0.4 mg Intramuscular Q2 Min PRN Sean Miles MD        ondansetron (ZOFRAN ODT) ODT tab 4 mg  4 mg Oral Q6H PRN Kassy Loya PA-C   4 mg at 06/18/25 0650    Or    ondansetron (ZOFRAN) injection 4 mg  4 mg Intravenous Q6H PRN Kassy Loya PA-C        oxyCODONE IR (ROXICODONE) half-tab 2.5 mg  2.5 mg Oral Q4H PRN Kassy Loya PA-C        Or    oxyCODONE (ROXICODONE) tablet 5 mg  5 mg Oral Q4H PRN Kassy Loya PA-C   5 mg at 06/17/25 0629    polyethylene glycol (MIRALAX) Packet 17 g  17 g Oral BID PRN Kay Tam PA-C        prochlorperazine (COMPAZINE) injection 5 mg  5 mg Intravenous Q6H PRN Kassy Loya PA-C        Or    prochlorperazine (COMPAZINE) tablet 5 mg  5 mg Oral Q6H PRN Kassy Loya PA-C        senna-docusate (SENOKOT-S/PERICOLACE) 8.6-50 MG per tablet 1 tablet  1 tablet Oral BID PRN Kay Tam PA-C        Or    senna-docusate (SENOKOT-S/PERICOLACE) 8.6-50 MG per tablet 2 tablet  2 tablet Oral BID PRN Kay Tam PA-C        sodium chloride (PF) 0.9% PF flush 3 mL  3 mL Intracatheter q1 min prn Kassy Loya PA-C   3 mL at 06/20/25 1055    sodium chloride (PF) 0.9% PF flush 3 mL  3 mL Intracatheter q1 min prn Kay Tam PA-C                Data:      All new lab and imaging data was reviewed.

## 2025-06-20 NOTE — PLAN OF CARE
Goal Outcome Evaluation:      Plan of Care Reviewed With: patient    Shift Summary [id number]     Admitting Diagnosis: Closed displaced intertrochanteric fracture of right femur, initial encounter (H) [S72.141A]  Hip fracture, right, closed, initial encounter (H) [S72.001A]      POD#: 4 ORIF R HIP.   Mental Status: A/Ox3, disoriented to time.   Activity/dangle: A1-2/GB/W, not oob this shift.   Diet: soft and bite size(level 7), thin liquids, FR 1500, poor appetite.   Pain: c/o general soreness, managed with prn atarax x2.    Izaguirre/Voiding: purewick in place, 2 loose BM (brown/green) this shift.   Tele/Restraints/Iso: tele: SR.   02/LDA: RA. L PIV: SL.   D/C Date: TBD.      Other Info: CMS intact. R inner thigh bruised. Hgb from yesterday at 7.2. sodium at 128. 6 AM labs pending for today.     Temp: 99  F (37.2  C) Temp src: Oral BP: (!) 163/83 Pulse: 82   Resp: 16 SpO2: 98 % O2 Device: None (Room air)

## 2025-06-20 NOTE — PLAN OF CARE
Shift Summary      Ortho    Diagnosis: Mechanical fall with R femur fracture  S/p R hip ORIF  POD#: 3  Mental Status: A/Ox3-4 but has some confusion  Activity/dangle: Assit of 1-2 w/ GB and walker  Diet: Changed to soft and bite size, poor appetite.  Pain: Pain is managed by scheduled Tylenol, denies abdominal discomfort  Izaguirre/Voiding: Purewick but incontinent as well. Still has some urinary retention but parameters not met for straight cathing.   Tele/Restraints/Iso: On tele- SR   02/LDA: RA, PIV  D/C Date: TCU placement    Other Info:    CMS intact, incision dressing changed to Aquacel. FR is now 1500 ml/day. Still has very poor appetite. Had BM today after supp. Still has some abdominal distention but has +BS, passing gas. Has hgb of 7.6 today, recheck tonight at 8 PM

## 2025-06-20 NOTE — PLAN OF CARE
Goal Outcome Evaluation:    Denies CP, SOB. All pt needs met at this time. Possible discharge on Monday  TCU. 1 unit of blood started today.

## 2025-06-20 NOTE — PROGRESS NOTES
Orthopedic Surgery  Batsheva Fuentes  06/20/2025     Admit Date:  6/15/2025  Assessment:   POD: 4 Days Post-Op   Procedure(s):  OPEN REDUCTION INTERNAL FIXATION RIGHT HIP FRACTURE USING INTRAMEDULLARY NAIL     Patient resting comfortably in bed, her  is at bedside.     Pain controlled with medications  Denies chest pain or shortness of breath  Ilieus managed by medicine   No new orthopedic complaints    Temp:  [97.7  F (36.5  C)-99  F (37.2  C)] 97.7  F (36.5  C)  Pulse:  [77-83] 77  Resp:  [16] 16  BP: (150-170)/(75-88) 156/88  SpO2:  [98 %-100 %] 98 %    Alert  Dressing is clean, dry, and intact.   Moderate right thigh swelling and posterior/medical thigh ecchymosis present.   Minimal erythema of the surrounding skin   Bilateral calves are soft, non-tender  Sensation intact bilateral lower extremities  Patient able to flex/extend toes and ankles.   Toes are warm to touch    Labs:  Recent Labs   Lab Test 06/20/25  0924 06/19/25 2011 06/19/25  1010 06/17/25  1505 06/17/25  0612 06/16/25  0540 06/15/25  1947   WBC  --   --   --   --  11.0 13.9* 28.3*   HGB 7.8* 7.2* 7.6*   < > 8.7* 11.1* 12.1   PLT  --   --   --   --  249 282 341    < > = values in this interval not displayed.     Recent Labs   Lab Test 06/15/25  1516   INR 1.03       Plan:   ASA 81 mg BID for DVT prophylaxis     Mobilize with PT/OT    WBAT Right LE     Continue current pain regimen   Dressings: Change as needed for saturations.    Follow-up: 2 weeks post-op with Kassy/Dr Mccullough's team   Social work coordinating discharge planning    Disposition:    Anticipate d/c to TCU       Christina Bowser PA-C  [facility] Orthopedics Trauma Team   [facility]  Non-operative provider (Floor [place]/ Trauma Rounds)

## 2025-06-20 NOTE — PROGRESS NOTES
Care Management Follow Up    Length of Stay (days): 5    Expected Discharge Date: 06/22/2025     Concerns to be Addressed:       Patient plan of care discussed at interdisciplinary rounds: Yes    Anticipated Discharge Disposition:  (tcu)              Anticipated Discharge Services:    Anticipated Discharge DME:      Patient/family educated on Medicare website which has current facility and service quality ratings:    Education Provided on the Discharge Plan:    Patient/Family in Agreement with the Plan:      Referrals Placed by CM/SW:    Private pay costs discussed: transportation costs    Discussed  Partnership in Safe Discharge Planning  document with patient/family: No     Handoff Completed: No, handoff not indicated or clinically appropriate    Additional Information:  Writer received a message from the provider stating that the ride might be a little too early for them to know if the patient would be medically ready.    Writer received a call from [employer] approving the prior authorization.  Prior Auth code [id number] - [id number]. Coverage Dates are from 06/20-06/26.  Keeshar passed this information along to Carla in admissions at [facility].    Writer contacted  [employer] transport and pushed the wheelchair ride between 17 .    Writer updated the provider.    Writer followed up with the patient and spouse, Onesimo, at bedside.  Writer informed them insurance authorization came back approved and there is a tentative ride time.  Patient and spouse stated understanding they are currently waiting on patient to be medically ready.    Writer was approached by the physician and informed the patient would not be medically ready and inquired at the facility can take over the weekend.  Writer stated most likely not but they will call and check.    Writer left a voicemail for Carla in admissions at [facility].    Writer reached out to Carla and admissions at [facility].  In the voicemail box, Carla stated the admissions team does not  accept people over the weekend.  At this time the provider stopped by to discuss why a ride was set up for the patient.  Writer stated this is a tentative precaution due to transportation rights being scarce.  Provider stated the patient's spouse wants to move forward with the discharge today due to this.  Writer stated they will talk to the patient, however, the provider has the last say.    Writer followed up with the patient's spouse who stated they do want to move forward for today and not wait for Monday.  Keeshar stated he will follow-up with piper and the provider.     received a voicemail from [facility] confirming that they would want a plan for Monday instead as that they have had too many patients admit who are not medically ready.    Keeshar followed up with the patient's spouse who was on the phone with their daughter, Corinne.  Both are in agreement to plan for Monday now.    Writer updated the physician    Writer contacted [facility] transport and pushed the wheelchair ride from Monday between 0801-2010    Next Steps: Discharge Monday    JESSICA Camacho  [facility]  Social Work

## 2025-06-21 LAB
ANION GAP SERPL CALCULATED.3IONS-SCNC: 9 MMOL/L (ref 7–15)
BUN SERPL-MCNC: 13.6 MG/DL (ref 8–23)
CALCIUM SERPL-MCNC: 8.4 MG/DL (ref 8.8–10.4)
CHLORIDE SERPL-SCNC: 98 MMOL/L (ref 98–107)
CREAT SERPL-MCNC: 0.66 MG/DL (ref 0.51–0.95)
EGFRCR SERPLBLD CKD-EPI 2021: 88 ML/MIN/1.73M2
GLUCOSE SERPL-MCNC: 102 MG/DL (ref 70–99)
HCO3 SERPL-SCNC: 27 MMOL/L (ref 22–29)
HGB BLD-MCNC: 8.9 G/DL (ref 11.7–15.7)
MCV RBC AUTO: 89 FL (ref 78–100)
POTASSIUM SERPL-SCNC: 3.7 MMOL/L (ref 3.4–5.3)
SODIUM SERPL-SCNC: 134 MMOL/L (ref 135–145)

## 2025-06-21 PROCEDURE — 120N000001 HC R&B MED SURG/OB

## 2025-06-21 PROCEDURE — 250N000013 HC RX MED GY IP 250 OP 250 PS 637

## 2025-06-21 PROCEDURE — 250N000013 HC RX MED GY IP 250 OP 250 PS 637: Performed by: PHYSICIAN ASSISTANT

## 2025-06-21 PROCEDURE — 80048 BASIC METABOLIC PNL TOTAL CA: CPT | Performed by: HOSPITALIST

## 2025-06-21 PROCEDURE — 99232 SBSQ HOSP IP/OBS MODERATE 35: CPT | Performed by: HOSPITALIST

## 2025-06-21 PROCEDURE — 250N000013 HC RX MED GY IP 250 OP 250 PS 637: Performed by: HOSPITALIST

## 2025-06-21 PROCEDURE — 82565 ASSAY OF CREATININE: CPT | Performed by: HOSPITALIST

## 2025-06-21 PROCEDURE — 36415 COLL VENOUS BLD VENIPUNCTURE: CPT | Performed by: HOSPITALIST

## 2025-06-21 PROCEDURE — 85018 HEMOGLOBIN: CPT | Performed by: HOSPITALIST

## 2025-06-21 RX ORDER — AMOXICILLIN 250 MG
2 CAPSULE ORAL AT BEDTIME
Status: DISCONTINUED | OUTPATIENT
Start: 2025-06-21 | End: 2025-06-22

## 2025-06-21 RX ORDER — POLYETHYLENE GLYCOL 3350 17 G/17G
17 POWDER, FOR SOLUTION ORAL 2 TIMES DAILY PRN
Status: DISCONTINUED | OUTPATIENT
Start: 2025-06-21 | End: 2025-06-23 | Stop reason: HOSPADM

## 2025-06-21 RX ADMIN — FAMOTIDINE 20 MG: 20 TABLET, FILM COATED ORAL at 10:12

## 2025-06-21 RX ADMIN — OXYCODONE HYDROCHLORIDE 2.5 MG: 5 TABLET ORAL at 13:24

## 2025-06-21 RX ADMIN — ASPIRIN 81 MG: 81 TABLET, DELAYED RELEASE ORAL at 20:39

## 2025-06-21 RX ADMIN — HYDROXYZINE HYDROCHLORIDE 10 MG: 10 TABLET, FILM COATED ORAL at 11:24

## 2025-06-21 RX ADMIN — LEVOTHYROXINE SODIUM 37.5 MCG: 75 TABLET ORAL at 10:11

## 2025-06-21 RX ADMIN — ASPIRIN 81 MG: 81 TABLET, DELAYED RELEASE ORAL at 10:11

## 2025-06-21 RX ADMIN — FUROSEMIDE 10 MG: 20 TABLET ORAL at 10:12

## 2025-06-21 RX ADMIN — LOSARTAN POTASSIUM 50 MG: 50 TABLET, FILM COATED ORAL at 10:12

## 2025-06-21 RX ADMIN — HYDROXYZINE HYDROCHLORIDE 10 MG: 10 TABLET, FILM COATED ORAL at 17:57

## 2025-06-21 RX ADMIN — ESCITALOPRAM OXALATE 5 MG: 5 TABLET, FILM COATED ORAL at 10:11

## 2025-06-21 ASSESSMENT — ACTIVITIES OF DAILY LIVING (ADL)
ADLS_ACUITY_SCORE: 79
ADLS_ACUITY_SCORE: 75
ADLS_ACUITY_SCORE: 82
ADLS_ACUITY_SCORE: 79
ADLS_ACUITY_SCORE: 82
ADLS_ACUITY_SCORE: 75

## 2025-06-21 NOTE — PLAN OF CARE
Goal Outcome Evaluation:      Plan of Care Reviewed With: patient      Shift Summary: Batsheva Fuentes is a 79 year old female admitted on 6/15/2025 after sustaining a mechanical fall at home.    Diagnosis: Mechanical fall with R femur fracture  S/p R hip ORIF  POD#: 4  Mental Status: A/Ox3-4 but has some confusion  Activity/dangle: Assit of 1-2 w/ GB and walker  Diet: Changed to soft and bite size, poor appetite.  Pain: Pain is managed by scheduled Tylenol, denies abdominal discomfort, had large BM today per report  Izaguirre/Voiding: Purewick but incontinent as well. Tele/Restraints/Iso: On tele- SR   02/LDA: GONZALO NEVILLE SL   D/C Date: TCU placement  Other Info:CMS intact, incision dressing changed to Aquacel. Denies CP, SOB. All pt needs met at this time. Possible discharge on Monday  TCU. 1 unit of blood transfused today, Hgb recheck tomorrow morning.

## 2025-06-21 NOTE — PROGRESS NOTES
Orthopedic Surgery  Batsheva Fuentes  06/21/2025     Admit Date:  6/15/2025  Assessment:   POD: 5 Days Post-Op   Procedure(s):  OPEN REDUCTION INTERNAL FIXATION RIGHT HIP FRACTURE USING INTRAMEDULLARY NAIL     Patient resting comfortably in bed, no new complaints at this time.  No overnight events.    Pain seemingly controlled with medications.  Tolerating oral diet and having bowel movements.    Post-op ileus improving, managed by primary team.     Temp:  [97.7  F (36.5  C)-99.3  F (37.4  C)] 98.1  F (36.7  C)  Pulse:  [80-95] 91  Resp:  [16-18] 18  BP: (139-164)/(75-91) 157/80  SpO2:  [97 %-100 %] 99 %    Alert, baseline dementia.  Dressing is clean, dry, and intact.   Moderate right thigh swelling and posterior/medial thigh ecchymosis present.   Minimal erythema of the surrounding skin   Bilateral calves are soft, non-tender  Sensation intact bilateral lower extremities  Patient able to flex/extend toes and ankles.   Toes are warm and well perfused    Labs:  Recent Labs   Lab Test 06/21/25  0718 06/20/25  0924 06/19/25 2011 06/17/25  1505 06/17/25  0612 06/16/25  0540 06/15/25  1947   WBC  --   --   --   --  11.0 13.9* 28.3*   HGB 8.9* 7.8* 7.2*   < > 8.7* 11.1* 12.1   PLT  --   --   --   --  249 282 341    < > = values in this interval not displayed.     Recent Labs   Lab Test 06/15/25  1516   INR 1.03     Plan:   Continue ASA 81 mg BID for DVT prophylaxis     Mobilize with PT/OT    WBAT Right LE     Continue current pain regimen   Dressings: Keep intact. Change if >60% saturated.   Follow-up: 2 weeks post-op with Kassy/Dr Mccullough's team   Social work coordinating discharge planning    Orthopedics will sign off at this time.  Please contact our team for any questions or concerns.     Disposition:    Anticipate d/c to TCU on Monday pending placement.       Mayra Bryson PA-C  [facility] Orthopedics Trauma Team   [facility]  Non-operative provider (Floor [place]/ Trauma  Rounds)

## 2025-06-21 NOTE — PROGRESS NOTES
[facility]  Hospitalist Progress Note   06/21/2025          Assessment and Plan:       Batsheva Fuentes is a 79 year old female with below PMHx admitted on 6/15/2025 after sustaining a mechanical fall at home.    Note, pt was enrolled in hospice care with [facility] for her hx of frontotemporal dementia PTA.  Hospice revoked and patient admitted to hospital for medical care.  Discussed with patient's , would like to continue restorative care at this time, trial off TCU for rehabilitation.    Status post open reduction internal fixation right hip using intramedullary nail on 6/16/2025.  Acute traumatic right femur fracture.  Status post mechanical fall prior to admission.  -Pt  reported that they both went out for a walk, while returning into the home pt tripped on a plastic, fell and landed on her right side.  Denied loss of consciousness.  After fall patient reported pain in right hip.  *CT head, cervical spine negative for acute pathology.   *XR showed acute mildly displaced and angulated intertrochanteric fracture of the right femur with moderate resulting varus angulation.   -Status post open reduction internal fixation right hip using intramedullary nail on 6/16/2025.  -Postprocedure patient having right thigh swelling, ecchymosis with drop in hemoglobin.  Patient with dementia, denies any acute pain.  --Orthopedic surgery following, will defer postoperative care including pharmacological DVT prophylaxis, rehabilitation to orthopedic surgery.  On aspirin 81 mg twice daily for DVT prophylaxis  Weightbearing as tolerated right lower extremity.  Scheduled Tylenol 3 times daily for pain.  Atarax as needed for adjuvant pain.  Robaxin as needed for muscle spasms.  Oxycodone 2.5-5 mg q4 hrs PRN, bowel meds while on narcotics.  Avoid narcotics as able to.  Rehab team following -TCU for rehabilitation    Status post 1 unit blood transfusion  Acute anemia postoperative -likely due  to surgical blood loss, dilutional  Preop hemoglobin in the 12.5 range.  Now dropped 8.7 > 7 range  Documented  mL.    --Patient having right thigh ecchymosis with  bruising.  Also received IV fluids.  Received 1 unit blood transfusion on 6/20.  --Now hemoglobin in the 8 range, monitor in AM.  Transfuse if hemoglobin less than 8 or symptomatic.  Monitor hemoglobin in AM     Abdominal distention likely from ileus - improving   Constipation  --On 6/18 reported abdominal distention, discomfort, no bowel movement for 4 days.  No nausea, vomiting.  Diffuse abdominal tenderness.  --Xray abdomen - dilated loops of small bowel. There is gas in nondilated colon. Anastomotic staples in the pelvis. Most likely, findings represent a postoperative adynamic ileus.   -- With bowel regimen, conservative management symptoms improving.  Tolerating oral diet, having bowel movements .  -- Regular diet as tolerated  Continue PTA famotidine.  As needed bowel meds.    Urinary retention postoperative.  This morning bladder scan for greater than 300 cc of urine.  Straight cath per protocol.  If fails voiding trial will replace Izaguirre catheter.  Bladder scans as needed.     Acute hyponatremia, SIADH:   --No hx of hyponatremia noted. Good appetite reported by pt's . No excessive water consumption. No evidence of volume overload. Pt is on Lexapro with last dose 6/15 AM prior to presentation.  -Sodium 128 on admission.   *Osm 273, urine sodium 120, urine osm 530 consistent with SIADH. ? Pain mediated given fracture.   *TSH 18.33, T4 1.59.   --Patient initially received 2% normal saline, sodium slightly trending up.  Nephrology signed off, appreciate input.  Liberalized 1800 mL fluid restriction.  Was on salt tablets, discontinued 6/21.  Continue Lexapro at low-dose of 5 mg oral daily.  Monitor sodium levels in a.m. or earlier if symptomatic.    Small pericardial effusion without tamponade.  Moderate concentric LVH  -Poor historian.   Denies any chest pain or shortness of breath at this time  -Patient with elevated proBNP, Echocardiogram with LVEF of 65 to 70%.  Moderate concentric LVH.  Consider infiltrative cardiomyopathy.  Small pericardial effusion.  No echocardiographic indications for cardiac tamponade.  Small inferior vena cava consistent with hypovolemia.  -Monitor volume status closely.  -Cardiology evaluation as outpatient.    Status post accelerated hypertension.  Hypertension.    At the time of admission blood pressures systolic in 200s, was in pain.  Initiated prior to admission losartan, pain control.  Continues to have elevated blood pressures.  This morning systolics in 170s.  Continue PTA losartan.  Added Lasix 10 mg oral daily on 6/20,  Some improvement in blood pressures.  As needed IV hydralazine  Monitor blood pressures, optimize dose     Acute encephalopathy likely delirium, underlying dementia, metabolic component.  Physical deconditioning from medical illness.  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 Rouse of Neurology 4/10/25 and Dr. Collado of Psychiatry.  -- Patient with intermittent confusion, able to answer simple questions.  Minimize narcotic use as able to.  Continue PTA Lexapro at lower dose.  As needed Seroquel ordered.  Closely monitor mental status.  Fall precautions, delirium precautions.  Rehab team following TCU.    Leukocytosis, improved -likely reactive.  No Signs or symptoms of infection on admission.  Afebrile.  WBC 28.3.  Procalcitonin 0.58.  UA unremarkable.   --WBC count normalized on 6/17.  Trend fever curve.     Abnormal TFTs   Hx of hypothyroidism:   TSH 18.33, T4 1.59. Pt maintained on Synthroid  37.5 mcg daily PTA, but was decreased from 75 mcg ~1 month PTA upon enrollment in hospice. TSH 2.50 and T4 1.52 in 1/2025.   - Continue Synthroid at current dose, suspect stress  "demargination from above.   Repeat TFTs in 4-6 weeks.      Chronic, stable medical conditions   Diverticulitis s/p colectomy   Cerebral aneurysm, SAH  Polymyalgia rheumatica   Tardive akathisia, improved with Risperdone      Hypoalbuminemia likely dilutional, acute illness.  Dietary supplements with Ensure.     Clinically Significant Risk Factors         # Hyponatremia: Lowest Na = 131 mmol/L in last 2 days, will monitor as appropriate  # Hypochloremia: Lowest Cl = 97 mmol/L in last 2 days, will monitor as appropriate      # Hypoalbuminemia: Lowest albumin = 3.2 g/dL at 6/18/2025  7:47 AM, will monitor as appropriate     # Hypertension: Noted on problem list            # Cachexia: Estimated body mass index is 15.75 kg/m  as calculated from the following:    Height as of this encounter: 1.702 m (5' 7\").    Weight as of this encounter: 45.6 kg (100 lb 8.5 oz).             Orders Placed This Encounter      Regular Diet Adult      DVT Prophylaxis: SCDs, on aspirin for DVT prophylaxis  Code Status: No CPR- Do NOT Intubate  Disposition: Expected discharge -Monday pending TCU placement    Medically Ready for Discharge: Anticipated in 2-4 Days     Discussed with patient,  by the bedside, bedside RN, care team  More than 70% of time spent in direct patient care, care coordination, patient counseling, and formalizing plan of care.        Sis Tay MD        Interval History:        Patient lying in bed.  Awake, can answer simple questions.   Denies any chest pain or palpitations.  Denies any abdominal pain.  Received blood transfusion yesterday.  Tolerating oral diet, passing gas, had bowel movements.  No shortness of breath on rest.  No blood in the urine or blood in the stools.  Passing urine.  Some improvement in blood pressures with initiating Lasix.  Up out of bed to chair           Physical Exam:        Physical Exam   Temp:  [97.7  F (36.5  C)-99.3  F (37.4  C)] 98.1  F (36.7  C)  Pulse:  [80-95] " 91  Resp:  [16-18] 18  BP: (139-164)/(75-91) 157/80  SpO2:  [97 %-100 %] 99 %    Intake/Output Summary (Last 24 hours) at 6/17/2025 1600  Last data filed at 6/17/2025 1451  Gross per 24 hour   Intake 740 ml   Output 550 ml   Net 190 ml       Admission Weight: 45 kg (99 lb 3.3 oz)  Current Weight: 45 kg (99 lb 3.3 oz)    PHYSICAL EXAM  GENERAL: Patient is in no distress.  Awake, can answer simple questions.  LUNGS:Respirations unlabored  ABDOMEN: Soft, less distended, minimal tenderness, bowel sounds heard.  NEURO: Moving all extremities.    EXTREMITIES: trace pedal edema.  Surgical site dressing intact.  SKIN: Warm, dry. Ecchymosis right thigh area - improving  PSYCHIATRY Cooperative       Medications:        Current Facility-Administered Medications   Medication Dose Route Frequency Provider Last Rate Last Admin    aspirin EC tablet 81 mg  81 mg Oral BID Kassy Loya PA-C   81 mg at 06/21/25 1011    escitalopram (LEXAPRO) tablet 5 mg  5 mg Oral Daily Sis Tay MD   5 mg at 06/21/25 1011    famotidine (PEPCID) tablet 20 mg  20 mg Oral Daily Sean Miles MD   20 mg at 06/21/25 1012    furosemide (LASIX) half-tab 10 mg  10 mg Oral Daily Sis Tay MD   10 mg at 06/21/25 1012    levothyroxine (SYNTHROID/LEVOTHROID) half-tab 37.5 mcg  37.5 mcg Oral Daily Kay Tam PA-C   37.5 mcg at 06/21/25 1011    losartan (COZAAR) tablet 50 mg  50 mg Oral Daily Sis Tay MD   50 mg at 06/21/25 1012    polyethylene glycol (MIRALAX) Packet 17 g  17 g Oral BID Sis Tay MD   17 g at 06/19/25 2029    senna-docusate (SENOKOT-S/PERICOLACE) 8.6-50 MG per tablet 2 tablet  2 tablet Oral BID Sis Tay MD   2 tablet at 06/19/25 2029    sodium chloride (PF) 0.9% PF flush 3 mL  3 mL Intracatheter Q8H Kassy Loya PA-C   3 mL at 06/20/25 2048    sodium chloride (PF) 0.9% PF flush 3 mL  3 mL Intracatheter Q8H Kay Franco PA-C   3 mL at 06/20/25 1299      Current Facility-Administered Medications   Medication Dose Route Frequency Provider Last Rate Last Admin    benzocaine-menthol (CHLORASEPTIC) 6-10 MG lozenge 1 lozenge  1 lozenge Buccal Q1H PRN Kassy Loya PA-C        bisacodyl (DULCOLAX) suppository 10 mg  10 mg Rectal Daily PRN Kassy Loya PA-C        calcium carbonate (TUMS) chewable tablet 1,000 mg  1,000 mg Oral 4x Daily PRN Kay Tam PA-C   1,000 mg at 06/18/25 0650    hydrALAZINE (APRESOLINE) injection 10 mg  10 mg Intravenous Q4H PRN Kay Tam PA-C   10 mg at 06/19/25 0142    HYDROmorphone (DILAUDID) injection 0.1 mg  0.1 mg Intravenous Q4H PRN Kassy Loya PA-C        Or    HYDROmorphone (DILAUDID) injection 0.2 mg  0.2 mg Intravenous Q4H PRN Kassy Loya PA-C   0.2 mg at 06/16/25 2108    hydrOXYzine HCl (ATARAX) tablet 10 mg  10 mg Oral Q6H PRN Kassy Loya PA-C   10 mg at 06/21/25 1124    lidocaine (LMX4) cream   Topical Q1H PRN Kassy Loya PA-C        lidocaine (LMX4) cream   Topical Q1H PRN Kay Tam PA-C        lidocaine 1 % 0.1-1 mL  0.1-1 mL Other Q1H PRN Kassy Loya PA-C        lidocaine 1 % 0.1-1 mL  0.1-1 mL Other Q1H PRN Kay Tam PA-C        magnesium hydroxide (MILK OF MAGNESIA) suspension 30 mL  30 mL Oral Daily PRN Kassy Loya PA-C        methocarbamol (ROBAXIN) half-tab 250 mg  250 mg Oral TID PRN Sis Tay MD   250 mg at 06/20/25 1142    naloxone (NARCAN) injection 0.2 mg  0.2 mg Intravenous Q2 Min PRN Sean Miles MD        Or    naloxone (NARCAN) injection 0.4 mg  0.4 mg Intravenous Q2 Min PRN Sean Miles MD        Or    naloxone (NARCAN) injection 0.2 mg  0.2 mg Intramuscular Q2 Min PRN Sean Miles MD        Or    naloxone (NARCAN) injection 0.4 mg  0.4 mg Intramuscular Q2 Min PRN Sean Miles MD        ondansetron (ZOFRAN ODT) ODT tab 4 mg  4 mg Oral Q6H PRN Kassy Loya PA-C   4 mg at  06/18/25 0650    Or    ondansetron (ZOFRAN) injection 4 mg  4 mg Intravenous Q6H PRN Kassy Loya PA-C        oxyCODONE IR (ROXICODONE) half-tab 2.5 mg  2.5 mg Oral Q4H PRN Kassy Loya PA-C   2.5 mg at 06/21/25 1324    Or    oxyCODONE (ROXICODONE) tablet 5 mg  5 mg Oral Q4H PRN Kassy Loya PA-C   5 mg at 06/17/25 0629    polyethylene glycol (MIRALAX) Packet 17 g  17 g Oral BID PRN Kay Tam PA-C        prochlorperazine (COMPAZINE) injection 5 mg  5 mg Intravenous Q6H PRN Kassy Loya PA-C        Or    prochlorperazine (COMPAZINE) tablet 5 mg  5 mg Oral Q6H PRN Kassy Loya PA-C        senna-docusate (SENOKOT-S/PERICOLACE) 8.6-50 MG per tablet 1 tablet  1 tablet Oral BID PRN Kay Tam PA-C        Or    senna-docusate (SENOKOT-S/PERICOLACE) 8.6-50 MG per tablet 2 tablet  2 tablet Oral BID PRN Kay Tam PA-C        sodium chloride (PF) 0.9% PF flush 3 mL  3 mL Intracatheter q1 min prn Kassy Loya PA-C   3 mL at 06/21/25 1015    sodium chloride (PF) 0.9% PF flush 3 mL  3 mL Intracatheter q1 min prn Kay Tam PA-C                Data:      All new lab and imaging data was reviewed.

## 2025-06-21 NOTE — PROGRESS NOTES
"Chart reviewed per LOS protocol    Note pt has been on hospice at home since 4/17/25 (hx dementia)   revoked hospice for pt to receive medical care  6/16: Surgery --> Closed reduction insertion intramedullary nail right hip   Likely discharge to TCU on Monday    Ht: 5'7\"  IBW: 61.4 kg  Wt hx:  Pt has had wt loss with dementia dx (pacing)  Wt Readings from Last 10 Encounters:   06/20/25 45.6 kg (100 lb 8.5 oz)   03/21/21 62.9 kg (138 lb 11.2 oz)   12/28/20 61.2 kg (135 lb)   12/17/20 63 kg (139 lb)   12/05/18 69.9 kg (154 lb)     06/20/25  45.6 kg (100 lb 8.5 oz)   06/19/25  45.8 kg (100 lb 15.5 oz)   06/18/25  41.9 kg (92 lb 6 oz)   06/16/25  45 kg (99 lb 3.3 oz)     Diet: Regular, 1800 mL fluid restriction          Room Service with Assist  Pt is being seen daily for meal ordering   She has been ordering meals TID  Appetite has been decreased  Will trial Ensure supplement (pt also receiving Magic Cup supplements prn)    Encourage po intake as pt tolerates  Continue to assist with meal ordering   Ensure supplement at 10am      Follow Up / Monitoring:   Per policy unless consulted    "

## 2025-06-21 NOTE — PLAN OF CARE
Goal Outcome Evaluation:    Denies CP, SOB. All pt needs met at this time. Possible discharge to TCU on Monday. Up to chair 2x today.

## 2025-06-21 NOTE — PLAN OF CARE
Goal Outcome Evaluation:  Patient vital signs are at baseline: Yes  Patient able to ambulate as they were prior to admission or with assist devices provided by therapies during their stay:  No,  Reason:  Patient is repositioning Q2hrs during the night.  Patient MUST void prior to discharge:  Yes  Patient able to tolerate oral intake:  Yes  Pain has adequate pain control using Oral analgesics:  No,  Reason:  Patient did not have any oral analgesic during the night shift.   Does patient have an identified :  Yes  Has goal D/C date and time been discussed with patient:  No,  Reason:  Discussion pending.

## 2025-06-22 LAB
HGB BLD-MCNC: 8.8 G/DL (ref 11.7–15.7)
MCV RBC AUTO: 88 FL (ref 78–100)
SODIUM SERPL-SCNC: 132 MMOL/L (ref 135–145)

## 2025-06-22 PROCEDURE — 250N000013 HC RX MED GY IP 250 OP 250 PS 637: Performed by: PHYSICIAN ASSISTANT

## 2025-06-22 PROCEDURE — 250N000013 HC RX MED GY IP 250 OP 250 PS 637: Performed by: HOSPITALIST

## 2025-06-22 PROCEDURE — 99232 SBSQ HOSP IP/OBS MODERATE 35: CPT | Performed by: HOSPITALIST

## 2025-06-22 PROCEDURE — 36415 COLL VENOUS BLD VENIPUNCTURE: CPT | Performed by: HOSPITALIST

## 2025-06-22 PROCEDURE — 84295 ASSAY OF SERUM SODIUM: CPT | Performed by: HOSPITALIST

## 2025-06-22 PROCEDURE — 85018 HEMOGLOBIN: CPT | Performed by: HOSPITALIST

## 2025-06-22 PROCEDURE — 120N000001 HC R&B MED SURG/OB

## 2025-06-22 PROCEDURE — 250N000013 HC RX MED GY IP 250 OP 250 PS 637

## 2025-06-22 RX ORDER — AMOXICILLIN 250 MG
2 CAPSULE ORAL
Status: DISCONTINUED | OUTPATIENT
Start: 2025-06-22 | End: 2025-06-23 | Stop reason: HOSPADM

## 2025-06-22 RX ADMIN — LEVOTHYROXINE SODIUM 37.5 MCG: 75 TABLET ORAL at 08:42

## 2025-06-22 RX ADMIN — OXYCODONE HYDROCHLORIDE 2.5 MG: 5 TABLET ORAL at 16:31

## 2025-06-22 RX ADMIN — ASPIRIN 81 MG: 81 TABLET, DELAYED RELEASE ORAL at 21:12

## 2025-06-22 RX ADMIN — FUROSEMIDE 10 MG: 20 TABLET ORAL at 08:42

## 2025-06-22 RX ADMIN — HYDROXYZINE HYDROCHLORIDE 10 MG: 10 TABLET, FILM COATED ORAL at 18:06

## 2025-06-22 RX ADMIN — ASPIRIN 81 MG: 81 TABLET, DELAYED RELEASE ORAL at 08:43

## 2025-06-22 RX ADMIN — LOSARTAN POTASSIUM 50 MG: 50 TABLET, FILM COATED ORAL at 08:43

## 2025-06-22 RX ADMIN — HYDROXYZINE HYDROCHLORIDE 10 MG: 10 TABLET, FILM COATED ORAL at 11:37

## 2025-06-22 RX ADMIN — OXYCODONE HYDROCHLORIDE 2.5 MG: 5 TABLET ORAL at 08:49

## 2025-06-22 RX ADMIN — ESCITALOPRAM OXALATE 5 MG: 5 TABLET, FILM COATED ORAL at 08:43

## 2025-06-22 RX ADMIN — FAMOTIDINE 20 MG: 20 TABLET, FILM COATED ORAL at 08:43

## 2025-06-22 ASSESSMENT — ACTIVITIES OF DAILY LIVING (ADL)
ADLS_ACUITY_SCORE: 81
ADLS_ACUITY_SCORE: 82
ADLS_ACUITY_SCORE: 81
ADLS_ACUITY_SCORE: 82
ADLS_ACUITY_SCORE: 81
ADLS_ACUITY_SCORE: 81
ADLS_ACUITY_SCORE: 82
ADLS_ACUITY_SCORE: 82
ADLS_ACUITY_SCORE: 81
ADLS_ACUITY_SCORE: 81
ADLS_ACUITY_SCORE: 82
ADLS_ACUITY_SCORE: 81

## 2025-06-22 NOTE — PLAN OF CARE
Goal Outcome Evaluation:    Denies CP, SOB, all pt needs met at this time. Ice and pain meds given throughout shift see MAR. Possible discharge to TCU tomorrow.

## 2025-06-22 NOTE — PLAN OF CARE
Goal Outcome Evaluation:      Patient vital signs are at baseline: Yes  Patient able to ambulate as they were prior to admission or with assist devices provided by therapies during their stay:  Yes  Patient MUST void prior to discharge:  Yes  Patient able to tolerate oral intake:  Yes  Pain has adequate pain control using Oral analgesics:  Yes  Does patient have an identified :  Yes  Has goal D/C date and time been discussed with patient:  No,  Reason:  Discussion pending. MD note  Is talking about discharge to TCU.

## 2025-06-22 NOTE — PROGRESS NOTES
[facility]  Hospitalist Progress Note   06/22/2025          Assessment and Plan:       Batsheva Fuentes is a 79 year old female with below PMHx admitted on 6/15/2025 after sustaining a mechanical fall at home.    Note, pt was enrolled in hospice care with [facility] for her hx of frontotemporal dementia PTA.  Hospice revoked and patient admitted to hospital for medical care.  Discussed with patient's , would like to continue restorative care at this time, trial off TCU for rehabilitation.    Status post open reduction internal fixation right hip using intramedullary nail on 6/16/2025.  Acute traumatic right femur fracture.  Status post mechanical fall prior to admission.  -Pt  reported that they both went out for a walk, while returning into the home pt tripped on a plastic, fell and landed on her right side.  Denied loss of consciousness.  After fall patient reported pain in right hip.  *CT head, cervical spine negative for acute pathology.   *XR showed acute mildly displaced and angulated intertrochanteric fracture of the right femur with moderate resulting varus angulation.   -Status post open reduction internal fixation right hip using intramedullary nail on 6/16/2025.  -Postprocedure patient having right thigh swelling, ecchymosis with drop in hemoglobin -now improving.  --Orthopedic surgery following, will defer postoperative care including pharmacological DVT prophylaxis, rehabilitation to orthopedic surgery.  On aspirin 81 mg twice daily for DVT prophylaxis  Weightbearing as tolerated right lower extremity.  Scheduled Tylenol 3 times daily for pain.  Atarax as needed for adjuvant pain.  Robaxin as needed for muscle spasms.  Oxycodone 2.5-5 mg q4 hrs PRN, bowel meds while on narcotics.  Minimize narcotic use as able to.  Rehab team following -TCU for rehabilitation  Age-appropriate health maintenance including bone health maintenance on PCP visit.    Status post 1 unit  blood transfusion  Acute anemia postoperative -likely due to surgical blood loss, dilutional  Preop hemoglobin in the 12.5 range.  Postprocedure hemoglobin dropped to 7.2.   Documented  mL.  Postoperative right thigh ecchymosis with bruising.  Received IV fluids  -Received 1 unit blood transfusion on 6/20.  --Posttransfusion hemoglobin stable in the 8 range.   Monitor hemoglobin in 5 days or earlier if symptomatic    Acute hyponatremia, SIADH: improving   -Sodium 128 on admission.   *Osm 273, urine sodium 120, urine osm 530 consistent with SIADH. ? Pain mediated given fracture.   *TSH 18.33, T4 1.59.   --Patient initially received 2% normal saline  initially, sodium trended up.  With fluid restriction, salt tablets sodium improving.   -- Sodium stable in 130s.  Nephrology signed off, appreciate input.  1500 mL fluid restriction  Continue Lexapro at low-dose of 5 mg oral daily, optimize as outpatient.   Monitor sodium levels in 5 days or earlier if symptomatic.    Abdominal distention likely from ileus - improving   Constipation - resolved.  History of sigmoid diverticulitis s/p LAR & colostomy   --On 6/18 reported abdominal distention, discomfort, no bowel movement for 4 days.  No nausea, vomiting.  Diffuse abdominal tenderness.  --Xray abdomen - dilated loops of small bowel. There is gas in nondilated colon. Anastomotic staples in the pelvis. Most likely, findings represent a postoperative adynamic ileus.   -- With bowel regimen, conservative management symptoms improving.  Tolerating oral diet, having bowel movements -now loose per report.  -- Regular diet as tolerated  Continue PTA famotidine.  Hold bowel meds, as needed on discharge.    Urinary retention postoperative -resolved   This morning bladder scan for greater than 300 cc of urine.  Straight cath per protocol.  If fails voiding trial will replace Izaguirre catheter.  Bladder scans as needed.     Small pericardial effusion without tamponade.  Moderate  concentric LVH  Status post accelerated hypertension.  Hypertension.    Poor historian.  Denies any chest pain or shortness of breath at this time  At the time of admission blood pressures systolic in 200s, was in pain.    Elevated proBNP, Echocardiogram with LVEF of 65 to 70%.  Moderate concentric LVH.  Consider infiltrative cardiomyopathy.  Small pericardial effusion.  No echocardiographic indications for cardiac tamponade.  Small inferior vena cava consistent with hypovolemia.    -Continue PTA losartan.  -Monitor volume status closely.  Monitor blood pressures, pain control -manage antihypertensives as outpatient.  -Consider Cardiology evaluation as outpatient.    Acute encephalopathy likely delirium, underlying dementia, metabolic component.  Physical deconditioning from medical illness.  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 Rouse of Neurology 4/10/25 and Dr. Collado of Psychiatry.  -- Patient with intermittent confusion, able to answer simple questions.  Minimize narcotic use as able to.  Continue PTA Lexapro at lower dose.  Closely monitor mental status.  Fall precautions, delirium precautions.  Rehab team following TCU.    Leukocytosis, improved -likely reactive.  No Signs or symptoms of infection on admission.  Afebrile.  WBC 28.3.  Procalcitonin 0.58.  UA unremarkable.   --WBC count normalized on 6/17.  Trend fever curve.     Abnormal TFTs   Hx of hypothyroidism:   TSH 18.33, T4 1.59. Pt maintained on Synthroid  37.5 mcg daily PTA, but was decreased from 75 mcg ~1 month PTA upon enrollment in hospice. TSH 2.50 and T4 1.52 in 1/2025.   - Continue Synthroid at current dose, suspect stress demargination from above.   Repeat TFTs in 4-6 weeks.      Chronic, stable medical conditions   Cerebral aneurysm, SAH  Polymyalgia rheumatica   Tardive akathisia, improved with Risperdone   "    Hypoalbuminemia likely dilutional, acute illness.  Dietary supplements with Ensure.     Clinically Significant Risk Factors         # Hyponatremia: Lowest Na = 132 mmol/L in last 2 days, will monitor as appropriate       # Hypoalbuminemia: Lowest albumin = 3.2 g/dL at 6/18/2025  7:47 AM, will monitor as appropriate     # Hypertension: Noted on problem list            # Cachexia: Estimated body mass index is 15.75 kg/m  as calculated from the following:    Height as of this encounter: 1.702 m (5' 7\").    Weight as of this encounter: 45.6 kg (100 lb 8.5 oz).             Orders Placed This Encounter      Regular Diet Adult      DVT Prophylaxis: SCDs, on aspirin for DVT prophylaxis  Code Status: No CPR- Do NOT Intubate  Disposition: Expected discharge -Monday to TCU.  Medically stable.    Medically Ready for Discharge: Anticipated Tomorrow     Discussed with patient,  by the bedside, bedside RN, care team  More than 70% of time spent in direct patient care, care coordination, patient counseling, and formalizing plan of care.        Sis Tay MD        Interval History:        Patient lying in bed.  Awake, can answer simple questions.   Denies any chest pain or palpitations.  Tolerating oral diet, passing gas, had bowel movements -loose  No shortness of breath on rest.  No blood in the urine or blood in the stools.       Physical Exam:        Physical Exam   Temp:  [97.5  F (36.4  C)-99.3  F (37.4  C)] 97.5  F (36.4  C)  Pulse:  [80-91] 80  Resp:  [16-18] 16  BP: (142-157)/(80-88) 149/80  SpO2:  [97 %-99 %] 99 %    Intake/Output Summary (Last 24 hours) at 6/17/2025 1600  Last data filed at 6/17/2025 1451  Gross per 24 hour   Intake 740 ml   Output 550 ml   Net 190 ml       Admission Weight: 45 kg (99 lb 3.3 oz)  Current Weight: 45 kg (99 lb 3.3 oz)    PHYSICAL EXAM  GENERAL: Patient is in no distress.  Awake, can answer simple questions.  LUNGS:Respirations unlabored  ABDOMEN: Soft, less distended, " minimal tenderness, bowel sounds heard.  NEURO: Moving all extremities.    EXTREMITIES: trace pedal edema.  Surgical site dressing intact.  SKIN: Warm, dry. Ecchymosis right thigh area - improving  PSYCHIATRY Cooperative       Medications:        Current Facility-Administered Medications   Medication Dose Route Frequency Provider Last Rate Last Admin    aspirin EC tablet 81 mg  81 mg Oral BID Kassy Loya PA-C   81 mg at 06/22/25 0843    escitalopram (LEXAPRO) tablet 5 mg  5 mg Oral Daily Sis Tay MD   5 mg at 06/22/25 0843    famotidine (PEPCID) tablet 20 mg  20 mg Oral Daily Sean Miles MD   20 mg at 06/22/25 0843    furosemide (LASIX) half-tab 10 mg  10 mg Oral Daily Sis Tay MD   10 mg at 06/22/25 0842    levothyroxine (SYNTHROID/LEVOTHROID) half-tab 37.5 mcg  37.5 mcg Oral Daily Kay Tam PA-C   37.5 mcg at 06/22/25 0842    losartan (COZAAR) tablet 50 mg  50 mg Oral Daily Sis Tay MD   50 mg at 06/22/25 0843    senna-docusate (SENOKOT-S/PERICOLACE) 8.6-50 MG per tablet 2 tablet  2 tablet Oral At Bedtime Sis Tay MD        sodium chloride (PF) 0.9% PF flush 3 mL  3 mL Intracatheter Q8H Kassy Loya PA-C   3 mL at 06/21/25 2039    sodium chloride (PF) 0.9% PF flush 3 mL  3 mL Intracatheter Q8H [facility] Kay Tam PA-C   3 mL at 06/20/25 2323     Current Facility-Administered Medications   Medication Dose Route Frequency Provider Last Rate Last Admin    benzocaine-menthol (CHLORASEPTIC) 6-10 MG lozenge 1 lozenge  1 lozenge Buccal Q1H PRN Kassy Loya PA-C        bisacodyl (DULCOLAX) suppository 10 mg  10 mg Rectal Daily PRN Kassy Loya PA-C        calcium carbonate (TUMS) chewable tablet 1,000 mg  1,000 mg Oral 4x Daily PRN Kay Tam PA-C   1,000 mg at 06/18/25 0650    hydrALAZINE (APRESOLINE) injection 10 mg  10 mg Intravenous Q4H PRN Kay Tam PA-C   10 mg at 06/19/25 0142    HYDROmorphone  (DILAUDID) injection 0.1 mg  0.1 mg Intravenous Q4H PRN Kassy Loya PA-C        Or    HYDROmorphone (DILAUDID) injection 0.2 mg  0.2 mg Intravenous Q4H PRN Kassy Loya PA-C   0.2 mg at 06/16/25 2108    hydrOXYzine HCl (ATARAX) tablet 10 mg  10 mg Oral Q6H PRN Kassy Loya PA-C   10 mg at 06/21/25 1757    lidocaine (LMX4) cream   Topical Q1H PRN Kassy Loya PA-C        lidocaine (LMX4) cream   Topical Q1H PRN Kay Tam PA-C        lidocaine 1 % 0.1-1 mL  0.1-1 mL Other Q1H PRN Kassy Loya PA-C        lidocaine 1 % 0.1-1 mL  0.1-1 mL Other Q1H PRN Kay Tam PA-C        magnesium hydroxide (MILK OF MAGNESIA) suspension 30 mL  30 mL Oral Daily PRN Kassy Loya PA-C        methocarbamol (ROBAXIN) half-tab 250 mg  250 mg Oral TID PRN Sis Tay MD   250 mg at 06/20/25 1142    naloxone (NARCAN) injection 0.2 mg  0.2 mg Intravenous Q2 Min PRN Sean Miles MD        Or    naloxone (NARCAN) injection 0.4 mg  0.4 mg Intravenous Q2 Min PRN Sean Miles MD        Or    naloxone (NARCAN) injection 0.2 mg  0.2 mg Intramuscular Q2 Min PRN Sean Miles MD        Or    naloxone (NARCAN) injection 0.4 mg  0.4 mg Intramuscular Q2 Min PRN Sean Miles MD        ondansetron (ZOFRAN ODT) ODT tab 4 mg  4 mg Oral Q6H PRN Kassy Loya PA-C   4 mg at 06/18/25 0650    Or    ondansetron (ZOFRAN) injection 4 mg  4 mg Intravenous Q6H PRN Kassy Loya PA-C        oxyCODONE IR (ROXICODONE) half-tab 2.5 mg  2.5 mg Oral Q4H PRN Kassy Loya PA-C   2.5 mg at 06/22/25 0849    Or    oxyCODONE (ROXICODONE) tablet 5 mg  5 mg Oral Q4H PRN Kassy Loya PA-C   5 mg at 06/17/25 0629    polyethylene glycol (MIRALAX) Packet 17 g  17 g Oral BID PRN Sis Tay MD        polyethylene glycol (MIRALAX) Packet 17 g  17 g Oral BID PRN Kay Tam PA-C        prochlorperazine (COMPAZINE) injection 5 mg  5 mg Intravenous Q6H PRN Vincenzo  SEE Elena        Or    prochlorperazine (COMPAZINE) tablet 5 mg  5 mg Oral Q6H PRN Kassy Loya PA-C        senna-docusate (SENOKOT-S/PERICOLACE) 8.6-50 MG per tablet 1 tablet  1 tablet Oral BID PRN Kay Tam PA-C        Or    senna-docusate (SENOKOT-S/PERICOLACE) 8.6-50 MG per tablet 2 tablet  2 tablet Oral BID PRN Kay Tam PA-C        sodium chloride (PF) 0.9% PF flush 3 mL  3 mL Intracatheter q1 min prn Kassy Loya PA-C   3 mL at 06/22/25 0843    sodium chloride (PF) 0.9% PF flush 3 mL  3 mL Intracatheter q1 min prn Kay Tam PA-C                Data:      All new lab and imaging data was reviewed.

## 2025-06-23 VITALS
DIASTOLIC BLOOD PRESSURE: 83 MMHG | SYSTOLIC BLOOD PRESSURE: 149 MMHG | OXYGEN SATURATION: 99 % | BODY MASS INDEX: 15.78 KG/M2 | RESPIRATION RATE: 18 BRPM | WEIGHT: 100.53 LBS | HEIGHT: 67 IN | TEMPERATURE: 98.6 F | HEART RATE: 87 BPM

## 2025-06-23 LAB
HGB BLD-MCNC: 9.2 G/DL (ref 11.7–15.7)
MCV RBC AUTO: 88 FL (ref 78–100)

## 2025-06-23 PROCEDURE — 250N000013 HC RX MED GY IP 250 OP 250 PS 637: Performed by: HOSPITALIST

## 2025-06-23 PROCEDURE — 250N000013 HC RX MED GY IP 250 OP 250 PS 637

## 2025-06-23 PROCEDURE — 85018 HEMOGLOBIN: CPT | Performed by: STUDENT IN AN ORGANIZED HEALTH CARE EDUCATION/TRAINING PROGRAM

## 2025-06-23 PROCEDURE — 99239 HOSP IP/OBS DSCHRG MGMT >30: CPT | Performed by: STUDENT IN AN ORGANIZED HEALTH CARE EDUCATION/TRAINING PROGRAM

## 2025-06-23 PROCEDURE — 250N000013 HC RX MED GY IP 250 OP 250 PS 637: Performed by: PHYSICIAN ASSISTANT

## 2025-06-23 PROCEDURE — 36415 COLL VENOUS BLD VENIPUNCTURE: CPT | Performed by: STUDENT IN AN ORGANIZED HEALTH CARE EDUCATION/TRAINING PROGRAM

## 2025-06-23 RX ORDER — OXYCODONE HYDROCHLORIDE 5 MG/1
2.5 TABLET ORAL EVERY 8 HOURS PRN
Qty: 5 TABLET | Refills: 0 | Status: SHIPPED | OUTPATIENT
Start: 2025-06-23 | End: 2025-06-26

## 2025-06-23 RX ORDER — ESCITALOPRAM OXALATE 5 MG/1
5 TABLET ORAL DAILY
DISCHARGE
Start: 2025-06-23

## 2025-06-23 RX ORDER — FAMOTIDINE 20 MG/1
20 TABLET, FILM COATED ORAL DAILY
DISCHARGE
Start: 2025-06-23

## 2025-06-23 RX ADMIN — HYDROXYZINE HYDROCHLORIDE 10 MG: 10 TABLET, FILM COATED ORAL at 09:09

## 2025-06-23 RX ADMIN — OXYCODONE HYDROCHLORIDE 2.5 MG: 5 TABLET ORAL at 05:44

## 2025-06-23 RX ADMIN — METHOCARBAMOL 250 MG: 500 TABLET ORAL at 09:09

## 2025-06-23 RX ADMIN — LOSARTAN POTASSIUM 50 MG: 50 TABLET, FILM COATED ORAL at 09:09

## 2025-06-23 RX ADMIN — FAMOTIDINE 20 MG: 20 TABLET, FILM COATED ORAL at 09:09

## 2025-06-23 RX ADMIN — OXYCODONE HYDROCHLORIDE 5 MG: 5 TABLET ORAL at 10:19

## 2025-06-23 RX ADMIN — LEVOTHYROXINE SODIUM 37.5 MCG: 75 TABLET ORAL at 09:09

## 2025-06-23 RX ADMIN — ASPIRIN 81 MG: 81 TABLET, DELAYED RELEASE ORAL at 09:09

## 2025-06-23 RX ADMIN — ESCITALOPRAM OXALATE 5 MG: 5 TABLET, FILM COATED ORAL at 09:09

## 2025-06-23 ASSESSMENT — ACTIVITIES OF DAILY LIVING (ADL)
ADLS_ACUITY_SCORE: 82
ADLS_ACUITY_SCORE: 78
ADLS_ACUITY_SCORE: 82

## 2025-06-23 NOTE — PLAN OF CARE
Physical Therapy Discharge Summary    Reason for therapy discharge:    Discharged to transitional care facility.    Progress towards therapy goal(s). See goals on Care Plan in [facility] electronic health record for goal details.  Goals not met.  Barriers to achieving goals:   discharge from facility.    Therapy recommendation(s):    Continued therapy is recommended.  Rationale/Recommendations:  To progress independence and safety with functional mobility.

## 2025-06-23 NOTE — DISCHARGE SUMMARY
[facility]  Hospitalist Discharge Summary     Admit Date:  6/15/2025  Discharge Date:     6/23/2025  Discharging Provider: Amanda Sanchez MD    PRIMARY CARE PROVIDER:    Eileen Go     DISCHARGE DIAGNOSES:     Acute Traumatic Right Femur Fracture s/p R ORIF with IM Nailing on 6/16/25   S/p Mechanical Fall prior to Admission  Likely Acute Blood Loss Anemia along with Dilutional Component   Acute hyponatremia likely 2/2 SIADH: Improving   Abdominal Distention likely from Ileus: Improving   Constipation: Resolved  History of Sigmoid Diverticulitis s/p LAR & Colostomy   Urinary retention postoperative: Resolved   Small Pericardial Effusion without Tamponade  Moderate Concentric LVH  S/p Accelerated Hypertension  Hypertension  Acute encephalopathy likely delirium, underlying dementia, metabolic component.  Physical deconditioning from medical illness.  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   Cerebral aneurysm, SAH  Polymyalgia rheumatica   Tardive akathisia     BRIEF HISTORY OF PRESENT ILLNESS/HOSPITAL COURSE:   Batsheva Fuentes is a 79 year old female with below PMHx admitted on 6/15/2025 after sustaining a mechanical fall at home resulting in a right femur fracture.     Of note, pt was enrolled in hospice care with [facility] prior to 6/15/25 admission for her hx of frontotemporal dementia.  Hospice was revoked and patient was admitted to hospital for medical care.  Previous hospitalist discussed with patient's , would like to continue restorative care at this time, trial of TCU for rehabilitation. Patients  would like her to be able to walk again.      Acute Traumatic Right Femur Fracture s/p R ORIF with IM Nailing on 6/16/25   S/p Mechanical  Fall prior to Admission  Pt  reported that they both went out for a walk, while returning into the home pt tripped, fell and landed on her right side.  Denied loss of consciousness.  After fall patient reported pain in right hip. CT head, cervical spine negative for acute pathology.  XR showed acute mildly displaced and angulated intertrochanteric fracture of the right femur with moderate resulting varus angulation. Orthopedic surgery consulted and took patient to the OR on 6/16/25 for an open reduction internal fixation right hip using intramedullary nail on 6/16/2025. Postprocedure patient had right thigh swelling, ecchymosis with drop in hemoglobin. Was transfused one unit of blood on 6/20/25. Hemoglobin stabilized.     - Pain Regimen:   - Scheduled Tylenol 3 times daily for pain.  - Atarax as needed for adjuvant pain.  - Robaxin as needed for muscle spasms.  - Oxycodone 2.5-5 mg q4 hrs PRN  - Bowel meds while on narcotics.  Minimize narcotic use as able to    - Orthopedic Surgery Recommendations    - ASA 81mg BID for DVT ppx    - Mobilize with PT/OT               - WBAT Right LE   - Follow-up: 2 weeks post-op with Kassy/Dr Mccullough's team     - PT/OT consulted    - Recommending TCU     - SW consulted      Likely Acute Blood Loss Anemia along with Dilutional Component   Preop hemoglobin in the 12.5 range.  Postprocedure hemoglobin dropped to 7.2.   Documented  mL.  Postoperative right thigh ecchymosis with bruising.  Received IV fluids. Received 1 unit blood transfusion on 6/20. Posttransfusion hemoglobin stable in the 8 range.   - Hgb: 12.1 > 11.1 > 8.7 > 7.2 > 8.8 > 8.9 > 9.2    - Baseline: 11-12  - Monitor hemoglobin in 5 days or earlier if symptomatic     Acute hyponatremia likely 2/2 SIADH: Improving   Sodium was 128 on admission. Osm 273, urine sodium 120, urine osm 530 consistent with SIADH. ? Pain mediated given fracture. TSH 18.33, T4 1.59. Patient initially received 2% normal saline   initially, sodium trended up. With fluid restriction, salt tablets sodium improving. Nephrology was consulted.   - Sodium stable in 130s.  - Continue 1500 mL fluid restriction  - Continue Lexapro at low-dose of 5 mg oral daily, optimize as outpatient.     - Monitor sodium levels in 5 days or earlier if symptomatic.     Abdominal Distention likely from Ileus: Improving   Constipation: Resolved  History of Sigmoid Diverticulitis s/p LAR & Colostomy   On 6/18 reported abdominal distention, discomfort, no bowel movement for 4 days.  No nausea, vomiting.  Diffuse abdominal tenderness. Xray abdomen - dilated loops of small bowel. There is gas in nondilated colon. Anastomotic staples in the pelvis. Most likely, findings represent a postoperative adynamic ileus. With bowel regimen, conservative management symptoms improving.  Tolerating oral diet, having bowel movements -now loose per report.  - Regular diet as tolerated  - Continue PTA famotidine.  - Hold bowel meds, as needed on discharge.     Urinary retention postoperative: Resolved   - Bladder scans as needed.     Small Pericardial Effusion without Tamponade  Moderate Concentric LVH  S/p Accelerated Hypertension  Hypertension  Poor historian. Denies any chest pain or shortness of breath at this time. At the time of admission blood pressures systolic in 200s, but patient was in pain. Elevated proBNP, Echocardiogram with LVEF of 65 to 70%.  Moderate concentric LVH.  Consider infiltrative cardiomyopathy.  Small pericardial effusion.  No echocardiographic indications for cardiac tamponade.  Small inferior vena cava consistent with hypovolemia.    - Continue PTA losartan.  - Monitor volume status closely.  Monitor blood pressures, pain control -manage antihypertensives as outpatient.  - Consider Cardiology evaluation as outpatient.    Acute encephalopathy likely delirium, underlying dementia, metabolic component.  Physical deconditioning from medical  "illness.  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 Rouse of Neurology 4/10/25 and Dr. Collado of Psychiatry.  - Patient with intermittent confusion, able to answer simple questions.  - Minimize narcotic use as able to.  - Continue PTA Lexapro at lower dose.  - Closely monitor mental status.  - Fall precautions, delirium precautions.     Leukocytosis: Resolved  No Signs or symptoms of infection on admission. Afebrile.  WBC 28.3. Procalcitonin 0.58.  - UA unremarkable.   - WBC count normalized on 6/17.    Abnormal TFTs   Hx of hypothyroidism:   TSH 18.33, T4 1.59. Pt maintained on Synthroid  37.5 mcg daily PTA, but was decreased from 75 mcg ~1 month PTA upon enrollment in hospice. TSH 2.50 and T4 1.52 in 1/2025.   - Continue Synthroid at current dose, suspect stress demargination from above.   - Repeat TFTs in 4-6 weeks.        Hypoalbuminemia likely dilutional, acute illness.  Dietary supplements with Ensure.     Chronic, stable medical conditions   Cerebral aneurysm, SAH  Polymyalgia rheumatica   Tardive akathisia       Clinically Significant Risk Factors         # Hyponatremia: Lowest Na = 132 mmol/L in last 2 days, will monitor as appropriate       # Hypoalbuminemia: Lowest albumin = 3.2 g/dL at 6/18/2025  7:47 AM, will monitor as appropriate     # Hypertension: Noted on problem list            # Cachexia: Estimated body mass index is 15.75 kg/m  as calculated from the following:    Height as of this encounter: 1.702 m (5' 7\").    Weight as of this encounter: 45.6 kg (100 lb 8.5 oz).                  TOTAL DISCHARGE TIME:  Amanda JOE MD, personally saw the patient today and spent greater than 30 minutes discharging this patient.    Amanda Sanchez MD  [facility]  Hospitalist Service   Securely message with the Vocera Web Console (learn more here)  Text page " via [facility] Paging/Directory        Significant Results and Procedures   As above    Pending Results   These results will be followed up by n/a  Unresulted Labs Ordered in the Past 30 Days of this Admission       Date and Time Order Name Status Description    6/20/2025  1:34 PM Prepare red blood cells (unit) Preliminary     6/20/2025  1:18 PM Prepare red blood cells (unit) Preliminary     6/16/2025  4:45 AM Prepare red blood cells (unit) Preliminary     6/16/2025  4:45 AM Prepare red blood cells (unit) Preliminary             Code Status   DNR / DNI       Primary Care Physician   Eileen Go    Physical Exam   Temp: 98.6  F (37  C) Temp src: Oral BP: (!) 149/83 Pulse: 87   Resp: 18 SpO2: 99 % O2 Device: None (Room air)    Vitals:    06/18/25 1210 06/19/25 1730 06/20/25 0500   Weight: 41.9 kg (92 lb 6 oz) 45.8 kg (100 lb 15.5 oz) 45.6 kg (100 lb 8.5 oz)     Vital Signs with Ranges  Temp:  [98.3  F (36.8  C)-99.2  F (37.3  C)] 98.6  F (37  C)  Pulse:  [85-87] 87  Resp:  [18] 18  BP: (142-155)/(83-89) 149/83  SpO2:  [98 %-99 %] 99 %  I/O last 3 completed shifts:  In: 1150 [P.O.:1150]  Out: 550 [Urine:550]    Constitutional: Awake, no apparent distress.    ENT: Normocephalic, without obvious abnormality, atraumatic, oral pharynx with moist mucus membranes  Pulmonary: No increased work of breathing, good air exchange, clear to auscultation bilaterally, no crackles or wheezing.  Cardiovascular: Regular rate and rhythm, normal S1 and S2  GI: Normal bowel sounds, soft, non-distended, non-tender.   Skin/Integumen: Visualized skin appeared clear.  Neuro: Patient seen moving all 4 extremities without difficulty.    Psych:  Alert and oriented x 2, disoriented to time and situation   Extremities: RLE: bruising noted of upper thigh down to knee, bruising appears old, does not look new, mild tenderness to palpation, 2 bandages over surgical sites     Discharge Disposition   Discharged to short-term care facility  Condition  at discharge: Stable    Consultations This Hospital Stay   ORTHOPEDIC SURGERY IP CONSULT  PHYSICAL THERAPY ADULT IP CONSULT  OCCUPATIONAL THERAPY ADULT IP CONSULT  CARE MANAGEMENT / SOCIAL WORK IP CONSULT  NEPHROLOGY IP CONSULT  PHYSICAL THERAPY ADULT IP CONSULT  OCCUPATIONAL THERAPY ADULT IP CONSULT  CARE MANAGEMENT / SOCIAL WORK IP CONSULT  PHYSICAL THERAPY ADULT IP CONSULT  OCCUPATIONAL THERAPY ADULT IP CONSULT      Discharge Orders      Primary Care - Care Coordination Referral      Wound care (specify)    Site:   right hip  Instructions:  keep intact, may change if >60% saturated or peeling off     Follow Up and recommended labs and tests    Please call as soon as possible to make an appointment to be seen by Dr. Easton Mccullough/Kassy Loya PA-C clinic at 2 weeks postop for a recheck of your surgical site, possible repeat x-rays, and wound care. If you are at a TCU at this time and they have x-ray capabilities, you may complete your wound care and x-rays at your TCU and have them send your images to Dr. Mccullough's office.     Dr. Mccullough's care coordinator is Marissa Barboza. Please contact her at [phone] to schedule an appointment.       Dr. Mccullough sees patients at 2 clinic locations:  [facility]  [address], [place] [id number] [place] [address] [facility]   [address] , [address], [place] [address]      Please call the on-call phone number [phone] during evenings, nights and weekends for any urgent needs. Prescription refills must be done during business hours by calling [phone].     Activity - Up with assistive device     Activity - Up with nursing assistance     Activity - Ambulate in hallway    Every shift     Weight bearing status    Weightbearing as tolerated right lower extremity     Reason for your hospital stay    You were admitted to the hospital after a fall, noted to have acute right femur fracture, underwent surgery on 6/16/2025.  Was also noted  to have anemia requiring blood transfusion, low sodium.  Symptoms improving plan for discharge to TCU for rehabilitation.     Follow Up    Follow-up with care provider at TCU in 3 to 5 days or earlier if symptomatic.  Monitor BMP, CBC, albumin on 6/25 or earlier if symptomatic.  Optimize fluid restriction, Lexapro dosing depending on sodium levels.    Follow-up with orthopedic surgery in clinic per schedule.  Consider cardiology evaluation as outpatient, noted small pericardial effusion without tamponade, moderate concentric LVH on echocardiogram.  Age-appropriate health maintenance including bone health maintenance on PCP visit.  Follow thyroid function tests in 4 to 6 weeks.     Discharge Instructions    Monitor mental status closely.  Minimize narcotic use as able to.  Fall precautions, delirium precautions.     Monitor and record    Monitor daily blood pressure, heart rate review on provider visit and optimize antihypertensive therapy.     Mantoux instructions    Give two-step Mantoux (PPD) Per Facility Policy Yes     General info for SNF    Length of Stay Estimate: Short Term Care: Estimated # of Days 31-90  Condition at Discharge: Stable  Level of care:skilled   Rehabilitation Potential: Fair  Admission H&P remains valid and up-to-date: Yes  Recent Chemotherapy: N/A  Use Nursing Home Standing Orders: Yes     Physical Therapy Adult Consult    Evaluate and treat as clinically indicated.    Reason:  s/p intramedullary nail right femur to treat intertrochanteric fracture, may weightbear as tolerated     Occupational Therapy Adult Consult    Evaluate and treat as clinically indicated.    Reason:  s/p intramedullary nail right femur to treat intertrochanteric fracture, may weightbear as tolerated     [facility] DME    DME Documentation: Describe the reason for need to support medical necessity: Impaired gait status post hip surgery. I, the undersigned, certify that the above prescribed supplies are medically necessary for  this patient and is both reasonable and necessary in reference to accepted standards of medical practice in the treatment of this patient's condition and is not prescribed as a convenience.     [facility]    DME Documentation: Describe the reason for need to support medical necessity: Impaired gait status post hip surgery. I, the undersigned, certify that the above prescribed supplies are medically necessary for this patient and is both reasonable and necessary in reference to accepted standards of medical practice in the treatment of this patient's condition and is not prescribed as a convenience.     Diet    Snacks/Supplements Adult: Other; 10am: chocolate Ensure (RD); Between Meals      Fluid restriction 1500 ML FLUID      Regular Diet Adult     Discharge Medications   Current Discharge Medication List        START taking these medications    Details   aspirin (ASA) 81 MG EC tablet Take 1 tablet (81 mg) by mouth 2 times daily.    Associated Diagnoses: Closed displaced intertrochanteric fracture of right femur, initial encounter (H)      bisacodyl (DULCOLAX) 10 MG suppository Place 1 suppository (10 mg) rectally daily as needed for constipation (Use if Magnesium hydroxide (MILK of MAGNESIA) not effective after 24 hours. May discontinue if patient having bowel movement.).    Associated Diagnoses: Closed displaced intertrochanteric fracture of right femur, initial encounter (H)      famotidine (PEPCID) 20 MG tablet Take 1 tablet (20 mg) by mouth daily.    Associated Diagnoses: Closed displaced intertrochanteric fracture of right femur, initial encounter (H)      hydrOXYzine HCl (ATARAX) 10 MG tablet Take 1 tablet (10 mg) by mouth every 6 hours as needed for other (adjuvant pain).    Associated Diagnoses: Closed displaced intertrochanteric fracture of right femur, initial encounter (H)      methocarbamol (ROBAXIN) 500 MG tablet Take 0.5 tablets (250 mg) by mouth 3 times daily as needed for muscle spasms.     Associated Diagnoses: Closed displaced intertrochanteric fracture of right femur, initial encounter (H)      oxyCODONE (ROXICODONE) 5 MG tablet Take 0.5 tablets (2.5 mg) by mouth every 8 hours as needed for pain.  Qty: 5 tablet, Refills: 0    Associated Diagnoses: Closed displaced intertrochanteric fracture of right femur, initial encounter (H)      polyethylene glycol (MIRALAX) 17 GM/Dose powder Take 17 g by mouth daily.    Associated Diagnoses: Closed displaced intertrochanteric fracture of right femur, initial encounter (H)      senna-docusate (SENOKOT-S/PERICOLACE) 8.6-50 MG tablet Take 2 tablets by mouth 2 times daily as needed for constipation.    Associated Diagnoses: Closed displaced intertrochanteric fracture of right femur, initial encounter (H)           CONTINUE these medications which have CHANGED    Details   escitalopram (LEXAPRO) 5 MG tablet Take 1 tablet (5 mg) by mouth daily.    Associated Diagnoses: Depression, unspecified depression type           CONTINUE these medications which have NOT CHANGED    Details   levothyroxine (SYNTHROID/LEVOTHROID) 75 MCG tablet Take 37.5 mcg by mouth daily.      losartan (COZAAR) 100 MG tablet Take 50 mg by mouth daily.           STOP taking these medications       UNKNOWN TO PATIENT Comments:   Reason for Stopping:             Allergies   Allergies   Allergen Reactions    Blood Transfusion Related (Informational Only) Other (See Comments)     Patient has a history of a clinically significant antibody against RBC antigens.  A delay in compatible RBCs may occur.     Data   Most Recent 3 CBC's:  Recent Labs   Lab Test 06/23/25  1203 06/22/25  0718 06/21/25  0718 06/17/25  1505 06/17/25  0612 06/16/25  0540 06/15/25  1947   WBC  --   --   --   --  11.0 13.9* 28.3*   HGB 9.2* 8.8* 8.9*   < > 8.7* 11.1* 12.1   MCV 88 88 89   < > 90 82 85   PLT  --   --   --   --  249 282 341    < > = values in this interval not displayed.      Most Recent 3 BMP's:  Recent Labs   Lab  Test 06/22/25  0718 06/21/25  0718 06/20/25  0924 06/19/25  1010   * 134* 131* 128*   POTASSIUM  --  3.7 3.7 4.1   CHLORIDE  --  98 97* 95*   CO2  --  27 26 23   BUN  --  13.6 10.0 14.6   CR  --  0.66 0.57 0.61   ANIONGAP  --  9 8 10   ABBEY  --  8.4* 8.1* 8.2*   GLC  --  102* 99 104*     Most Recent 2 LFT's:  Recent Labs   Lab Test 06/18/25  0747 06/16/25  0540   AST 27 37   ALT 11 20   ALKPHOS 52 58   BILITOTAL 0.5 1.4*     Most Recent INR's and Anticoagulation Dosing History:  Anticoagulation Dose History          Latest Ref Rng & Units 2/12/2005 2/16/2005 2/17/2005 6/15/2025   Recent Dosing and Labs   INR 0.85 - 1.15 0.98  1.07  1.05  1.03      Most Recent 3 Troponin's:  Recent Labs   Lab Test 03/21/21  1302   TROPI <0.015     Most Recent Cholesterol Panel:No lab results found.  Most Recent 6 Bacteria Isolates From Any Culture (See EPIC Reports for Culture Details):  Recent Labs   Lab Test 03/21/21  1418 03/21/21  1410 03/21/21  1301   CULT No growth >100,000 colonies/mL  Escherichia coli  * No growth     Most Recent TSH, T4 and A1c Labs:  Recent Labs   Lab Test 06/16/25  0540 06/15/25  1947   TSH  --  18.33*   T4  --  1.59   A1C 5.2  --      Results for orders placed or performed during the hospital encounter of 06/15/25   CT Head w/o Contrast    Narrative    EXAM: CT HEAD W/O CONTRAST  LOCATION: [facility]  DATE: 6/15/2025    INDICATION: Fall, possible head injury  COMPARISON: Head CT 05/20/2018  TECHNIQUE: Routine CT Head without IV contrast. Multiplanar reformats. Dose reduction techniques were used.    FINDINGS: Moderate motion artifact present.    INTRACRANIAL CONTENTS: Right suboccipital craniectomy changes. There is a vascular clip lateral to the right aspect of the medulla. No intracranial hemorrhage. Mild diffuse cerebral parenchymal volume loss. No midline shift. The basilar cisterns are   patent. Mild periventricular and scattered foci of deep white matter hypodensities  involving both cerebral hemispheres. No cerebellar tonsillar ectopia. No CT evidence for an acute infarct.    VISUALIZED ORBITS/SINUSES/MASTOIDS: Prior bilateral cataract surgery. Visualized portions of the orbits are otherwise unremarkable. No paranasal sinus mucosal disease. No middle ear or mastoid effusion.    BONES/SOFT TISSUES: Right frontal sumit hole.      Impression    IMPRESSION:  1.  No intracranial hemorrhage, mass lesions, hydrocephalus or CT evidence for an acute infarct seen within the limitations of this exam.  2.  Right suboccipital craniectomy changes. There is a vascular clip lateral to the right aspect of the medulla.  3.  Mild diffuse cerebral parenchymal volume loss. Presumed chronic hypertensive/microvascular ischemic white matter changes.     CT Cervical Spine w/o Contrast    Narrative    EXAM: CT CERVICAL SPINE W/O CONTRAST  LOCATION: [facility]  DATE: 6/15/2025    INDICATION: Fall, possible neck injury.  Suspect right hip fx  COMPARISON: None.  TECHNIQUE: Routine CT Cervical Spine without IV contrast. Multiplanar reformats. Dose reduction techniques were used.    FINDINGS:  VERTEBRA: Mild straightening of the normal cervical lordosis. 2 mm retrolisthesis from C3-C4 to C6-C7. Craniocervical junction is within normal limits. Vertebral body heights are maintained. No prevertebral edema. No fractures.    CANAL/FORAMINA: Moderate intervertebral disc height loss and endplate spurring from C4-C5 to C6-C7. Circumferential disc osteophyte complex from C3-C4 to C6-C7. Moderate spinal canal narrowing at C3-C4. Moderate severe spinal canal narrowing at C4-C5   C5-C6. Moderate spinal canal narrowing at C6-C7.    Mild left neuroforaminal narrowing at C2-C3. Severe bilateral neuroforaminal narrowing at C3-C4 and C4-C5. Severe right and mild to moderate left neuroforaminal narrowing at C5-C6. Severe left and moderate right neuroforaminal narrowing at C6-C7.    PARASPINAL:  Multinodular thyroid gland. The lung apices are clear.      Impression    IMPRESSION:  1.  No fracture or posttraumatic subluxation.  2.  Moderate to severe spinal canal narrowing at C4-C5 and C5-C6. Moderate spinal canal narrowing at C3-C4 and C6-C7.  3.  Severe bilateral neuroforaminal narrowing at C3-C4 and C4-C5. Severe right and mild to moderate left neuroforaminal narrowing at C5-C6. Severe left and moderate right neuroforaminal narrowing at C6-C7.     XR Pelvis w Hip Right 1 View    Narrative    EXAM: XR PELVIS AND HIP RIGHT 1 VIEW  LOCATION: [facility]  DATE: 6/15/2025    INDICATION: Right hip pain after fall, concern for fx  COMPARISON: None.      Impression    IMPRESSION: Acute mildly displaced and angulated intertrochanteric fracture of the right femur with moderate resulting varus angulation. No additional fractures.   XR Chest Port 1 View    Narrative    EXAM: XR CHEST PORT 1 VIEW  LOCATION: [facility]  DATE: 6/15/2025    INDICATION: leukocytosis, rule out infection. No URI sx reported, no cough.  COMPARISON: Chest x-ray 3/21/2021      Impression    IMPRESSION: Cardiac silhouette size is within normal limits. Mild calcified atherosclerotic changes of the aortic arch. Visualized lungs are clear.   XR Surgery CRYSTAL Fluoro Less Than 5 Min w Stills    Narrative    This exam was marked as non-reportable because it will not be read by a   radiologist or a [place] non-radiologist provider.         XR Abdomen 2 Views    Narrative    EXAM: XR ABDOMEN 2 VIEWS  LOCATION: [facility]  DATE: 6/18/2025    INDICATION: Abdominal pain, sluggish bowel sounds.  Status post open reduction internal fixation right hip using intramedullary nail on 6 16 2025    COMPARISON: None.      Impression    IMPRESSION: There are dilated loops of small bowel. There is gas in nondilated colon. Anastomotic staples in the pelvis. Most likely, findings represent a  postoperative adynamic ileus.   Echocardiogram Complete     Value    LVEF  65-70%    [facility]    [id number]  [id number]  [id number]  [id number]^[id number]^     [facility]  Echocardiography Laboratory  [address] [place] [address]     Name: SAMMY STYLES  MRN: [id number]  : 1945  Study Date: 2025 09:40 AM  Age: 79 yrs  Gender: Female  Patient Location: [facility]  Reason For Study: CHF  Ordering Physician: POOJA DURANT  Performed By: April Noriega     BSA: 1.5 m2  Height: 67 in  Weight: 99 lb  HR: 89  BP: 129/84 mmHg  ______________________________________________________________________________  Procedure  Echocardiogram with two-dimensional, color and spectral Doppler.  ______________________________________________________________________________  Interpretation Summary     The left ventricle is normal in size.  There is moderate concentric left ventricular hypertrophy. Consider  infiltrative CM.  Left ventricular systolic function is normal.  The visual ejection fraction is 65-70%.  Small inferior vena cava size consistent with hypovolemia.  Small pericardial effusion. There are no echocardiographic indications of  cardiac tamponade.  There is no comparison study available.  ______________________________________________________________________________  Left Ventricle  The left ventricle is normal in size. There is moderate concentric left  ventricular hypertrophy. Left ventricular systolic function is normal. The  visual ejection fraction is 65-70%. Grade I or early diastolic dysfunction.  Diastolic Doppler findings (E/E' ratio and/or other parameters) suggest left  ventricular filling pressures are indeterminate. No regional wall motion  abnormalities noted.     Right Ventricle  The right ventricle is normal in structure, function and size.     Atria  Normal left atrial size. Right atrial size is normal. There is no atrial shunt  seen.     Mitral Valve  The  mitral valve is normal in structure and function. There is trace mitral  regurgitation.     Tricuspid Valve  The tricuspid valve is normal in structure and function. There is trace  tricuspid regurgitation. Right ventricular systolic pressure could not be  approximated due to inadequate tricuspid regurgitation. IVC diameter <2.1 cm  collapsing >50% with sniff suggests a normal RA pressure of 3 mmHg.     Aortic Valve  Normal tricuspid aortic valve. There is trace aortic regurgitation. No aortic  stenosis is present.     Pulmonic Valve  The pulmonic valve is not well seen, but is grossly normal. There is trace  pulmonic valvular regurgitation.     Vessels  Normal size aorta. Small inferior vena cava size consistent with hypovolemia.     Pericardium  Small pericardial effusion. There are no echocardiographic indications of  cardiac tamponade.     Rhythm  Sinus rhythm was noted.  ______________________________________________________________________________  MMode/2D Measurements & Calculations     IVSd: 1.0 cm  LVIDd: 3.8 cm  LVIDs: 2.7 cm  LVPWd: 0.90 cm  FS: 28.3 %  LV mass(C)d: 109.0 grams  LV mass(C)dI: 72.7 grams/m2  Ao root diam: 3.4 cm  asc Aorta Diam: 3.7 cm  LVOT diam: 2.1 cm  LVOT area: 3.5 cm2  Ao root diam index Ht(cm/m): 2.0  Ao root diam index BSA (cm/m2): 2.3  Asc Ao diam index BSA (cm/m2): 2.4  Asc Ao diam index Ht(cm/m): 2.1  LA Volume (BP): 35.0 ml     LA Volume Index (BP): 23.3 ml/m2  RV Base: 2.7 cm  RWT: 0.47  TAPSE: 3.1 cm     Doppler Measurements & Calculations  MV E max chuy: 54.5 cm/sec  MV A max chuy: 99.6 cm/sec  MV E/A: 0.55  MV dec slope: 286.6 cm/sec2  MV dec time: 0.19 sec  PA acc time: 0.07 sec  E/E' avg: 10.3  Lateral E/e': 9.3  Medial E/e': 11.4  RV S Chuy: 33.1 cm/sec     ______________________________________________________________________________  Report approved by: Easton Alexander MD on 06/18/2025 01:21 PM

## 2025-06-23 NOTE — PLAN OF CARE
Goal Outcome Evaluation:       Patient vital signs are at baseline: Yes  Patient able to ambulate as they were prior to admission or with assist devices provided by therapies during their stay:  Yes  Patient MUST void prior to discharge:  Yes  Patient able to tolerate oral intake:  Yes  Pain has adequate pain control using Oral analgesics:  Yes  Does patient have an identified :  Yes  Has goal D/C date and time been discussed with patient:  Yes, MD's note mentioned discharge to TCU today in the evening.

## 2025-06-23 NOTE — PROGRESS NOTES
Care Management Discharge Note    Discharge Date: 06/23/2025       Discharge Disposition: Skilled Nursing Facility    Discharge Services: None    Discharge DME: None    Discharge Transportation: family or friend will provide    Private pay costs discussed: private room/amenity fees and transportation costs    Does the patient's insurance plan have a 3 day qualifying hospital stay waiver?  No    PAS Confirmation Code: [id number]  Patient/family educated on Medicare website which has current facility and service quality ratings: yes    Education Provided on the Discharge Plan: Yes  Persons Notified of Discharge Plans: Patient's spouse, TCU, and [facility] staff  Patient/Family in Agreement with the Plan: yes    Handoff Referral Completed: No, handoff not indicated or clinically appropriate    Additional Information:  Writer reached out to the provider inquiring if the patient was medically ready to discharge today. Provider stated all was ready, but they will see the patient and confirm after.     Keeshar reached out to Carla in admission at [facility] ( [phone]) who confirmed they were ready for today.      received discharge orders and sent them via DOD to [facility].  faxed over scripts to [facility] fax #  [phone].      received a call from Carla stating their nursing staff was concerned over the patient's Hgb levels as they are lower than they were the day before. After reviewing the chart while having Carla on the phone,  confirmed that on 08/21, the patient's Hgb level was at 8.9, and on the 22nd, it was 8.8. Carla stated they would like to have the patient's labs drawn today and remain at the hospital for one more day to confirm they are medically ready. Carla stated they do not have access to instant labs as we do here in the hospital, therefore, if there is a emergency, they would not know until a few days later. Keeshar stated he will follow up.      communicated with both the  hospitalist and the bedside rn and Stat labs were going to be placed. Hospitalist informed the writer typically when hgb level does not change after 3 days and remains consistent, it is marked as the new baseline.     Writer received a message from the bedside rn of patient's labs drawn and results were in. Hgb for today is 9.2.    Writer contacted Carla once more to communicate with the nursing team the patient is stable for 3 days in a row and to consider for today. Carla stated she will call back.     Keeshar received a call back from Carla stating they would be able to take today.     Keeshar was informed afterwards from the bedside rn the orders were missing a couple things.     Keeshar reached out to the provider and received updated orders that were sent to [facility] via [employer].     Writer confirmed the plan for today with the patient and their spouse.     Patient will discharge from [facility] to [facility] TCU with [facility]:  Wheelchair at [phone].  Please refer to  progress notes for further details.No further care management intervention anticipated at this time.  Please re-consult if further needs arise.  Care management signing off.            JESSICA Camacho  [facility]  Social Work

## 2025-06-23 NOTE — PLAN OF CARE
Orientation: alert and oriented x2- disoriented to time and stituation    Vitals/Tele: vitals stable on room air except elevated blood pressure, pain treated with hydroxyzine, robaxin, and oxycodone. Ice applied to R hip    IV Access/drains: L PIV- removed prior to discharge    Diet: regular- poor appetite    Mobility: assist 2 with turn and repo    GI/: incontinent of bowel and bladder, last BM 6/23    Wound/Skin: scattered bruising- R hip swollen and bruised, R hip incisions- aquacel, scab to R upper lip, skin tear to L forearm    Consults: PT/OT, SW/CC    Discharge Plan: TCU today    See Flow sheets for assessment

## 2025-07-01 ENCOUNTER — PATIENT OUTREACH (OUTPATIENT)
Dept: CARE COORDINATION | Facility: CLINIC | Age: 80
End: 2025-07-01
Payer: COMMERCIAL

## 2025-07-01 ASSESSMENT — ACTIVITIES OF DAILY LIVING (ADL)
DEPENDENT_IADLS:: TRANSPORTATION;CLEANING;COOKING;LAUNDRY;SHOPPING;MEAL PREPARATION;MEDICATION MANAGEMENT;MONEY MANAGEMENT

## 2025-07-01 NOTE — PROGRESS NOTES
Clinic Care Coordination Contact  Care Coordination Transition Communication    Clinical Data: Patient was hospitalized at [facility] from 6/15 to 6/23 with diagnosis of mechanical fall resulting in right femur fracture. Pt had been on hospice (Through [facility]) prior to admission but hospice was discontinued upon hospital admission.     Assessment: Patient has transitioned to TCU/ARU for short term rehabilitation:    Facility Name: [facility]  Transition Communication:  Notified facility of Primary Care- Care Coordination support via Telephone. Left message.    Plan: Care Coordinator will await notification from facility staff informing of patient's discharge plans/needs. Care Coordinator will review chart and outreach to facility staff every 4 weeks and as needed.     Kay Saavedra [facility]  Social Work Care Coordinator  Phone: [phone]

## 2025-07-06 ENCOUNTER — HEALTH MAINTENANCE LETTER (OUTPATIENT)
Age: 80
End: 2025-07-06

## 2025-07-15 ENCOUNTER — PATIENT OUTREACH (OUTPATIENT)
Dept: CARE COORDINATION | Facility: CLINIC | Age: 80
End: 2025-07-15
Payer: COMMERCIAL

## 2025-07-15 NOTE — PROGRESS NOTES
Clinic Care Coordination Contact  Follow Up Progress Note      Assessment: Call placed to pt's spouse (Onesimo), he updated writer that pt is being transferred to [facility] home tomorrow as he organs are shutting down. He shared that they have been  for 61 yrs. He sounded to be coping well given the circumstances, he was able to tour the facility yesterday and has visited others there in the past and feel that she will get very good care there. He denied having questions/concerns for writer at this time.    Care Gaps:    Health Maintenance Due   Topic Date Due    DEXA  Never done    ADVANCE CARE PLANNING  Never done    MEDICARE ANNUAL WELLNESS VISIT  Never done    FALL RISK ASSESSMENT  Never done    RSV VACCINE (1 - 1-dose 75+ series) Never done    DTAP/TDAP/TD VACCINE (2 - Td or Tdap) 09/09/2023    COVID-19 VACCINE (3 - 2024-25 season) 09/01/2024    PHQ-2 (once per calendar year)  Never done       Intervention/Education provided during outreach: Emotional support and condolences.         Plan:   Since pt is transitioning to hospice, will close.    Kay Saavedra [facility]  Social Work Care Coordinator  Phone: [phone]

## (undated) DEVICE — Device

## (undated) DEVICE — GOWN XXL 9575

## (undated) DEVICE — DRILL SRG 360MM 4.2MM T2 ALPHA LCK 2352-4236S

## (undated) DEVICE — KIT PATIENT CARE HANA TABLE PROFX SUPINE 6855

## (undated) DEVICE — BAG DECANTER STERILE WHITE DYNJDEC09

## (undated) DEVICE — SUCTION MANIFOLD NEPTUNE 2 SYS 4 PORT 0702-020-000

## (undated) DEVICE — DRSG TEGADERM 6X8" 1628

## (undated) DEVICE — REAMER CORTICAL OPENER GAMMA NAIL SYSTEM 1420-0080

## (undated) DEVICE — SOL WATER IRRIG 1000ML BOTTLE 2F7114

## (undated) DEVICE — DRSG XEROFORM 1X8"

## (undated) DEVICE — ESU GROUND PAD UNIVERSAL W/O CORD

## (undated) DEVICE — SOL NACL 0.9% IRRIG 1000ML BOTTLE 2F7124

## (undated) DEVICE — REAMER SRG LG SCR NS LTX REUSE 1420-0240

## (undated) DEVICE — KIT TURNOVER FAIRVIEW SOUTHDALE FULL SP3889

## (undated) DEVICE — SU MONOCRYL 3-0 PS-2 27" Y427H

## (undated) DEVICE — NDL 18GA 1.5" 305196

## (undated) DEVICE — DRAPE C-ARM 60X42" 1013

## (undated) DEVICE — GLOVE PROTEXIS BLUE W/NEU-THERA 8.5  2D73EB85

## (undated) DEVICE — SU VICRYL 0 CT-1 27" J340H

## (undated) DEVICE — GOWN IMPERVIOUS SPECIALTY XL/XLONG 39049

## (undated) DEVICE — PREP CHLORAPREP 26ML TINTED HI-LITE ORANGE 930815

## (undated) DEVICE — LINEN TOWEL PACK X5 5464

## (undated) DEVICE — SU VICRYL 2-0 CT-1 27" J339H

## (undated) DEVICE — PACK HIP NAILING SOP15HNSB

## (undated) DEVICE — CLOSURE SYS SKIN PREMIERPRO EXOFIN FUSION 4X22CM STRL 3472

## (undated) DEVICE — STPL SKIN 35W 6.9MM  PXW35

## (undated) RX ORDER — PROPOFOL 10 MG/ML
INJECTION, EMULSION INTRAVENOUS
Status: DISPENSED
Start: 2025-06-16

## (undated) RX ORDER — DEXAMETHASONE SODIUM PHOSPHATE 4 MG/ML
INJECTION, SOLUTION INTRA-ARTICULAR; INTRALESIONAL; INTRAMUSCULAR; INTRAVENOUS; SOFT TISSUE
Status: DISPENSED
Start: 2025-06-16

## (undated) RX ORDER — ONDANSETRON 2 MG/ML
INJECTION INTRAMUSCULAR; INTRAVENOUS
Status: DISPENSED
Start: 2025-06-16

## (undated) RX ORDER — FENTANYL CITRATE 0.05 MG/ML
INJECTION, SOLUTION INTRAMUSCULAR; INTRAVENOUS
Status: DISPENSED
Start: 2025-06-16

## (undated) RX ORDER — CEFAZOLIN SODIUM/WATER 2 G/20 ML
SYRINGE (ML) INTRAVENOUS
Status: DISPENSED
Start: 2025-06-16

## (undated) RX ORDER — BUPIVACAINE HYDROCHLORIDE AND EPINEPHRINE 2.5; 5 MG/ML; UG/ML
INJECTION, SOLUTION EPIDURAL; INFILTRATION; INTRACAUDAL; PERINEURAL
Status: DISPENSED
Start: 2025-06-16

## (undated) RX ORDER — FENTANYL CITRATE 50 UG/ML
INJECTION, SOLUTION INTRAMUSCULAR; INTRAVENOUS
Status: DISPENSED
Start: 2025-06-16

## (undated) RX ORDER — TRANEXAMIC ACID 10 MG/ML
INJECTION, SOLUTION INTRAVENOUS
Status: DISPENSED
Start: 2025-06-16